# Patient Record
Sex: FEMALE | Race: WHITE | NOT HISPANIC OR LATINO | Employment: FULL TIME
[De-identification: names, ages, dates, MRNs, and addresses within clinical notes are randomized per-mention and may not be internally consistent; named-entity substitution may affect disease eponyms.]

---

## 2017-06-08 DIAGNOSIS — D33.3 ACOUSTIC NEUROMA (H): Primary | ICD-10-CM

## 2017-06-17 ENCOUNTER — HEALTH MAINTENANCE LETTER (OUTPATIENT)
Age: 62
End: 2017-06-17

## 2017-08-02 ENCOUNTER — OFFICE VISIT (OUTPATIENT)
Dept: AUDIOLOGY | Facility: CLINIC | Age: 62
End: 2017-08-02

## 2017-08-02 DIAGNOSIS — D33.3 ACOUSTIC NEUROMA (H): ICD-10-CM

## 2017-08-02 DIAGNOSIS — H90.3 ASYMMETRICAL SENSORINEURAL HEARING LOSS: Primary | ICD-10-CM

## 2017-08-02 NOTE — MR AVS SNAPSHOT
After Visit Summary   8/2/2017    Herminia Pastor    MRN: [id number]           Patient Information     Date Of Birth          1955        Visit Information        Provider Department      8/2/2017 2:30 PM Erica Solano AuD [facility] Audiology        Today's Diagnoses     Asymmetrical sensorineural hearing loss    -  1    Acoustic neuroma (H)           Follow-ups after your visit        Who to contact     Please call your clinic at [phone] to:    Ask questions about your health    Make or cancel appointments    Discuss your medicines    Learn about your test results    Speak to your doctor   If you have compliments or concerns about an experience at your clinic, or if you wish to file a complaint, please contact [facility] Patient Relations at [phone] or email us at [email or web address]         Additional Information About Your Visit        MyChart Information     [employer] gives you secure access to your electronic health record. If you see a primary care provider, you can also send messages to your care team and make appointments. If you have questions, please call your primary care clinic.  If you do not have a primary care provider, please call [phone] and they will assist you.      [employer] is an electronic gateway that provides easy, online access to your medical records. With [employer], you can request a clinic appointment, read your test results, renew a prescription or communicate with your care team.     To access your existing account, please contact your [facility] Clinic or call [phone] for assistance.        Care EveryWhere ID     This is your Care EveryWhere ID. This could be used by other organizations to access your [place] medical records  [id number]        Your Vitals Were     Last Period                   (LMP Unknown)            Blood Pressure from Last 3 Encounters:   11/05/15 117/69   06/26/15  138/70   08/21/14 148/72    Weight from Last 3 Encounters:   11/05/15 78.8 kg (173 lb 11.6 oz)   06/26/15 77.1 kg (170 lb)   08/18/14 78.8 kg (173 lb 11.6 oz)              We Performed the Following     AUDIOGRAM/TYMPANOGRAM - INTERFACE     AUDIOLOGY ADULT REFERRAL     Reduced 52 - Cmprhn Audiometry Thrshld Eval & Speech Recog   (78583)     Tymps / Reflex   (46588)        Primary Care Provider    Md Other Clinic                Equal Access to Services     ROYER SANDHU : Hadii aad ku hadasho Soomaali, waaxda luqadaha, qaybta kaalmada adeegyada, jorgito montero haybrian landeros . So [facility] [phone].    ATENCIÓN: Si habla español, tiene a sheppard disposición servicios gratuitos de asistencia lingüística. Llame al [phone].    We comply with applicable federal civil rights laws and [place] laws. We do not discriminate on the basis of race, color, national origin, age, disability sex, sexual orientation or gender identity.            Thank you!     Thank you for choosing [facility] AUDIOLOGY  for your care. Our goal is always to provide you with excellent care. Hearing back from our patients is one way we can continue to improve our services. Please take a few minutes to complete the written survey that you may receive in the mail after your visit with us. Thank you!             Your Updated Medication List - Protect others around you: Learn how to safely use, store and throw away your medicines at www.disposemymeds.org.          This list is accurate as of: 8/2/17  4:07 PM.  Always use your most recent med list.                   Brand Name Dispense Instructions for use Diagnosis    CALCIUM PO      Take 300 mg by mouth daily        chlorpheniramine-pseudoePHEDrine 4-60 MG Tabs per tablet    PSEUDO-GEST PLUS     Take 1 tablet by mouth every 6 hours as needed for allergies        GLUCOSAMINE CHONDR COMPLEX PO      Take 2,000 mg by mouth daily        HYDROCHLOROTHIAZIDE PO      Take 25 mg by mouth daily         OMEPRAZOLE PO      Take 20 mg by mouth every morning        VITAMIN B 12 PO      Take 1,000 mcg by mouth daily

## 2017-08-02 NOTE — PROGRESS NOTES
AUDIOLOGY REPORT    SUBJECTIVE:  Herminia Pastor is a 61 year old female who was seen was seen in Audiology at the [facility] for audiologic evaluation, referred by Wilfrid Garcia MD. The patient has been seen previously in this clinic on 09/15/2015 for assessment and results indicated a normal sloping to mild sensorineural hearing loss in the left ear, with no responses coming from the right ear (dead ear). The patient had right acoustic neuroma resection surgery in 2014 in the right ear.The patient reports a bothersome tinnitus that is fairly constant in the right ear, along with ongoing dizziness since surgery. The patient denies pain drainage and tinnitus in the left ear.        OBJECTIVE:  Otoscopic exam indicates ears are clear of cerumen bilaterally     Pure Tone Thresholds assessed using conventional audiometry with good  reliability from 250-8000 Hz bilaterally using circumaural headphones     RIGHT:   DNT due to documented no responses on 09/15/2015    LEFT:    Normal sloping to mild rising to normal sensorineural hearing loss. Decrease at 500 Hz, and improvement at 4000 Hz is noted re: last test.     Tympanogram:    RIGHT: restricted eardrum mobility     LEFT:   restricted eardrum mobility     Reflexes (reported by stimulus ear):  RIGHT: Ipsilateral is DNT  RIGHT: Contralateral is DNT  LEFT:   Ipsilateral is present at normal levels  LEFT:   Contralateral is DN T      Speech Reception Threshold:    RIGHT: DNT     LEFT:   10 dB HL  Word Recognition Score:     RIGHT: DNT    LEFT:   100% at 55 dB HL using NU-6 recorded word list.      ASSESSMENT:   Left ear shows a normal sloping to mild rising to normal sensorineural hearing loss. Decreased at 500 Hz with an improvement noted at 4000 Hz re; 09/15/2015.Today s results were discussed with the patient in detail.     PLAN:  Patient was counseled regarding hearing loss and impact on communication. Patient is a good  candidate for CROS  amplification at this time.  It is recommended that the patient return for a hearing aid evaluation if so desired.  Please call this clinic with questions regarding these results or recommendations.          Idalmis Mariano, [facility]  Licensed Audiologist  [place] #[id number]

## 2017-11-01 LAB — MAMMOGRAM: NORMAL

## 2018-06-07 ENCOUNTER — TELEPHONE (OUTPATIENT)
Dept: OTOLARYNGOLOGY | Facility: CLINIC | Age: 63
End: 2018-06-07

## 2018-06-07 NOTE — TELEPHONE ENCOUNTER
[facility] Call Center    Phone Message    May a detailed message be left on voicemail: yes    Reason for Call: Other: Please follow up with pt to discuss new excerises for dizziness.     Action Taken: Message routed to:  [facility] garth ent

## 2018-06-07 NOTE — TELEPHONE ENCOUNTER
[facility] Call Center    Phone Message    May a detailed message be left on voicemail: yes    Reason for Call: Other: Please follow up with pt, returning call     Action Taken: Message routed to:  [facility] garth ent

## 2018-06-08 NOTE — TELEPHONE ENCOUNTER
6-8-18 spoke with pt: she states she is still having dizziness and is wondering if there is anything new Dr. Garcia is prescribing- any med's or exercises.  Pt s/p right trans lab acoustic neuroma resection 2014.  S/p right plate removal 2015.  Suggested pt rtc with MRI- due in August 2018. Pt states she can't afford this, she has a $ 2000 deductible. Also suggest PT- she has tried this and does not want to try it again-  It has been 3-4 since last PT and she should give this a try again. If PT would not be helpful then she may need balance testing. She was hoping for a medication. She will call back regarding the MRI.   Rebecca Machuca RN  ENT Care Coordinator   Otology  [phone]  6/8/2018 2:20 PM

## 2018-06-20 DIAGNOSIS — D33.3 ACOUSTIC NEUROMA (H): Primary | ICD-10-CM

## 2018-06-23 ENCOUNTER — TELEPHONE (OUTPATIENT)
Dept: OTOLARYNGOLOGY | Facility: CLINIC | Age: 63
End: 2018-06-23

## 2018-06-23 NOTE — TELEPHONE ENCOUNTER
Left patient voicemail regarding scheduling audio, mri and Dr Garcia. Gave patient my direct phone number to contact.      Harper  ENT Senior Clinic Coordinator

## 2018-07-11 ENCOUNTER — RADIANT APPOINTMENT (OUTPATIENT)
Dept: MRI IMAGING | Facility: CLINIC | Age: 63
End: 2018-07-11
Attending: OTOLARYNGOLOGY
Payer: COMMERCIAL

## 2018-07-11 DIAGNOSIS — D33.3 ACOUSTIC NEUROMA (H): ICD-10-CM

## 2018-07-11 RX ORDER — GADOBUTROL 604.72 MG/ML
7.5 INJECTION INTRAVENOUS ONCE
Status: COMPLETED | OUTPATIENT
Start: 2018-07-11 | End: 2018-07-11

## 2018-07-11 RX ADMIN — GADOBUTROL 7.5 ML: 604.72 INJECTION INTRAVENOUS at 16:38

## 2018-07-11 NOTE — DISCHARGE INSTRUCTIONS

## 2018-07-12 DIAGNOSIS — D33.3 ACOUSTIC NEUROMA (H): Primary | ICD-10-CM

## 2018-07-12 DIAGNOSIS — R42 DIZZINESS: ICD-10-CM

## 2018-07-13 ENCOUNTER — OFFICE VISIT (OUTPATIENT)
Dept: AUDIOLOGY | Facility: CLINIC | Age: 63
End: 2018-07-13
Payer: COMMERCIAL

## 2018-07-13 ENCOUNTER — OFFICE VISIT (OUTPATIENT)
Dept: OTOLARYNGOLOGY | Facility: CLINIC | Age: 63
End: 2018-07-13
Payer: COMMERCIAL

## 2018-07-13 DIAGNOSIS — R42 DIZZINESS: ICD-10-CM

## 2018-07-13 DIAGNOSIS — D33.3 VESTIBULAR SCHWANNOMA (H): Primary | ICD-10-CM

## 2018-07-13 DIAGNOSIS — H90.3 SENSORINEURAL HEARING LOSS, BILATERAL: Primary | ICD-10-CM

## 2018-07-13 DIAGNOSIS — D33.3 ACOUSTIC NEUROMA (H): ICD-10-CM

## 2018-07-13 ASSESSMENT — PAIN SCALES - GENERAL: PAINLEVEL: NO PAIN (0)

## 2018-07-13 NOTE — PATIENT INSTRUCTIONS
Please review the handout on Vertigo option:  Dr. Garcia suggest: Gentamycin injection or labyrinthectomy.   Please call our clinic for any questions,concerns,or worsening symptoms.      Clinic #[phone]       Option 3  for the triage nurse.

## 2018-07-13 NOTE — LETTER
7/13/2018       RE: Herminia Pastor  [address]  [place] [address]     Dear Colleague,    Thank you for referring your patient, Herminia Pastor, to the [facility] EAR NOSE AND THROAT at [facility]. Please see a copy of my visit note below.    History: This is a 62-year-old woman who underwent surgery for her acoustic neuroma back in 2014.  Of note, she was having dizziness prior to the surgery and had the surgery with the hope that it would get better.  If anything, she feels that her symptoms are worse.  The patient had no functional hearing in the affected right ear prior to the surgery and has not had a change since the procedure.  She had a translabyrinthine approach.  She reports that her symptoms of dizziness are incapacitating now and really bothersome.  She is very upset with the symptoms.  There are no other new medical problems to speak of.    Physical exam: Exam today shows that the incision line is clean and dry.  The right mastoid area appears healthy and palpation in the region does not cause pain now.  The tympanic membranes are anatomically intact.  Her facial nerve is a house Brackmann class I bilaterally.  She was able to perform tandem gait, and finger to nose testing.    Audio: An audiogram was obtained today and shows that there is no evidence for abnormality in the left ear.    Imaging: Imaging does not reveal a recurrent acoustic neuroma.  The fat graft has undergone atrophy.    Impression: Persistent dizziness and vertigo following acoustic neuroma surgery.    Discussion: She was seen in neurology approximately 1 year after her surgery.  Dr. 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  There is no note of this in the MRI report.  It would be unusual to have this disease without MRI evidence. Another visit to neurology might also be considered before anything further is performed.    Another  alternative theory might be that there is residual vestibular fibers along with the facial nerve that persist and there may be questionable areas of enhancement in the cochlea.  Perhaps a gentamicin injection through the round window would eliminate any residual function.  Prior to doing anything, we should repeat the ENG.  The ENG testing done several years ago was abnormal but did not have a diagnostic finding.    Reexploring the ear or field seems overly aggressive to me.  Considering her desperation, it should be considered  Perhaps there is some residual tissue in the vestibule that is contributing to her symptoms.      Again, thank you for allowing me to participate in the care of your patient.      Sincerely,    Wilfrid Garcia MD

## 2018-07-13 NOTE — MR AVS SNAPSHOT
After Visit Summary   7/13/2018    Herminia Pastor    MRN: [id number]           Patient Information     Date Of Birth          1955        Visit Information        Provider Department      7/13/2018 11:00 AM Hafsa Gaxiola, [facility] Audiology        Today's Diagnoses     Sensorineural hearing loss, bilateral    -  1    Acoustic neuroma (H)        Dizziness           Follow-ups after your visit        Who to contact     Please call your clinic at [phone] to:    Ask questions about your health    Make or cancel appointments    Discuss your medicines    Learn about your test results    Speak to your doctor            Additional Information About Your Visit        MyChart Information     [employer] gives you secure access to your electronic health record. If you see a primary care provider, you can also send messages to your care team and make appointments. If you have questions, please call your primary care clinic.  If you do not have a primary care provider, please call [phone] and they will assist you.      [employer] is an electronic gateway that provides easy, online access to your medical records. With [employer], you can request a clinic appointment, read your test results, renew a prescription or communicate with your care team.     To access your existing account, please contact your [facility] Clinic or call [phone] for assistance.        Care EveryWhere ID     This is your Care EveryWhere ID. This could be used by other organizations to access your [place] medical records  [id number]        Your Vitals Were     Last Period                   (LMP Unknown)            Blood Pressure from Last 3 Encounters:   11/05/15 117/69   06/26/15 138/70   08/21/14 148/72    Weight from Last 3 Encounters:   11/05/15 78.8 kg (173 lb 11.6 oz)   06/26/15 77.1 kg (170 lb)   08/18/14 78.8 kg (173 lb 11.6 oz)              We Performed the Following     AUDIOGRAM/TYMPANOGRAM -  INTERFACE     AUDIOLOGY ADULT REFERRAL     Reduced 52 - Cmprhn Audiometry Thrshld Eval & Speech Recog   (15399)     Reduced 52 - Tymps / Reflex   (65866)        Primary Care Provider    None Specified       No primary provider on file.        Equal Access to Services     ROYER SANDHU : Hadelvis freya ramirez julieto Sojorgeali, waaxda luqadaha, qaybta kaalmada adeammonyada, jorgito idiin crissy yrnmamon phelan leti mclain. So [facility] [phone].    ATENCIÓN: Si habla español, tiene a sheppard disposición servicios gratuitos de asistencia lingüística. Llame al [phone].    We comply with applicable federal civil rights laws and [place] laws. We do not discriminate on the basis of race, color, national origin, age, disability, sex, sexual orientation, or gender identity.            Thank you!     Thank you for choosing [facility] AUDIOLOGY  for your care. Our goal is always to provide you with excellent care. Hearing back from our patients is one way we can continue to improve our services. Please take a few minutes to complete the written survey that you may receive in the mail after your visit with us. Thank you!             Your Updated Medication List - Protect others around you: Learn how to safely use, store and throw away your medicines at www.disposemymeds.org.          This list is accurate as of 7/13/18 11:59 PM.  Always use your most recent med list.                   Brand Name Dispense Instructions for use Diagnosis    CALCIUM PO      Take 300 mg by mouth daily        chlorpheniramine-pseudoePHEDrine 4-60 MG Tabs per tablet    PSEUDO-GEST PLUS     Take 1 tablet by mouth every 6 hours as needed for allergies        GLUCOSAMINE CHONDR COMPLEX PO      Take 2,000 mg by mouth daily        HYDROCHLOROTHIAZIDE PO      Take 25 mg by mouth daily        OMEPRAZOLE PO      Take 20 mg by mouth every morning        VITAMIN B 12 PO      Take 1,000 mcg by mouth daily

## 2018-07-13 NOTE — MR AVS SNAPSHOT
After Visit Summary   7/13/2018    Herminia Pastor    MRN: [id number]           Patient Information     Date Of Birth          1955        Visit Information        Provider Department      7/13/2018 12:00 PM Wilfrid Garcia MD [facility] Ear Nose and Throat        Care Instructions    Please review the handout on Vertigo option:  Dr. Garcia suggest: Gentamycin injection or labyrinthectomy.   Please call our clinic for any questions,concerns,or worsening symptoms.      Clinic #[phone]       Option 3  for the triage nurse.          Follow-ups after your visit        Who to contact     Please call your clinic at [phone] to:    Ask questions about your health    Make or cancel appointments    Discuss your medicines    Learn about your test results    Speak to your doctor            Additional Information About Your Visit        [employer] Information     [employer] gives you secure access to your electronic health record. If you see a primary care provider, you can also send messages to your care team and make appointments. If you have questions, please call your primary care clinic.  If you do not have a primary care provider, please call [phone] and they will assist you.      [employer] is an electronic gateway that provides easy, online access to your medical records. With [employer], you can request a clinic appointment, read your test results, renew a prescription or communicate with your care team.     To access your existing account, please contact your [facility] Clinic or call [phone] for assistance.        Care EveryWhere ID     This is your Care EveryWhere ID. This could be used by other organizations to access your [place] medical records  [id number]        Your Vitals Were     Last Period                   (LMP Unknown)            Blood Pressure from Last 3 Encounters:   11/05/15 117/69   06/26/15 138/70   08/21/14 148/72    Weight from Last 3 Encounters:    11/05/15 78.8 kg (173 lb 11.6 oz)   06/26/15 77.1 kg (170 lb)   08/18/14 78.8 kg (173 lb 11.6 oz)              Today, you had the following     No orders found for display       Primary Care Provider    None Specified       No primary provider on file.        Equal Access to Services     YUSUFАНДРЕЙ PHOEBE : Hadii freya ku hadsnehalo Sojorgeali, waaxda luqadaha, qaybta kaalmada isha, jorgito leivarahulyumiko mclain. So [facility] [phone].    ATENCIÓN: Si habla español, tiene a sheppard disposición servicios gratuitos de asistencia lingüística. Llame al [phone].    We comply with applicable federal civil rights laws and [place] laws. We do not discriminate on the basis of race, color, national origin, age, disability, sex, sexual orientation, or gender identity.            Thank you!     Thank you for choosing [facility] EAR NOSE AND THROAT  for your care. Our goal is always to provide you with excellent care. Hearing back from our patients is one way we can continue to improve our services. Please take a few minutes to complete the written survey that you may receive in the mail after your visit with us. Thank you!             Your Updated Medication List - Protect others around you: Learn how to safely use, store and throw away your medicines at www.disposemymeds.org.          This list is accurate as of 7/13/18 12:24 PM.  Always use your most recent med list.                   Brand Name Dispense Instructions for use Diagnosis    CALCIUM PO      Take 300 mg by mouth daily        chlorpheniramine-pseudoePHEDrine 4-60 MG Tabs per tablet    PSEUDO-GEST PLUS     Take 1 tablet by mouth every 6 hours as needed for allergies        GLUCOSAMINE CHONDR COMPLEX PO      Take 2,000 mg by mouth daily        HYDROCHLOROTHIAZIDE PO      Take 25 mg by mouth daily        OMEPRAZOLE PO      Take 20 mg by mouth every morning        VITAMIN B 12 PO      Take 1,000 mcg by mouth daily

## 2018-07-16 NOTE — PROGRESS NOTES
History: This is a 62-year-old woman who underwent surgery for her acoustic neuroma back in 2014.  Of note, she was having dizziness prior to the surgery and had the surgery with the hope that it would get better.  If anything, she feels that her symptoms are worse.  The patient had no functional hearing in the affected right ear prior to the surgery and has not had a change since the procedure.  She had a translabyrinthine approach.  She reports that her symptoms of dizziness are incapacitating now and really bothersome.  She is very upset with the symptoms.  There are no other new medical problems to speak of.    Physical exam: Exam today shows that the incision line is clean and dry.  The right mastoid area appears healthy and palpation in the region does not cause pain now.  The tympanic membranes are anatomically intact.  Her facial nerve is a house Brackmann class I bilaterally.  She was able to perform tandem gait, and finger to nose testing.    Audio: An audiogram was obtained today and shows that there is no evidence for abnormality in the left ear.    Imaging: Imaging does not reveal a recurrent acoustic neuroma.  The fat graft has undergone atrophy.    Impression: Persistent dizziness and vertigo following acoustic neuroma surgery.    Discussion: She was seen in neurology approximately 1 year after her surgery.  Dr. 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  There is no note of this in the MRI report.  It would be unusual to have this disease without MRI evidence. Another visit to neurology might also be considered before anything further is performed.    Another alternative theory might be that there is residual vestibular fibers along with the facial nerve that persist and there may be questionable areas of enhancement in the cochlea.  Perhaps a gentamicin injection through the round window would eliminate any residual function.  Prior to doing anything, we  should repeat the ENG.  The ENG testing done several years ago was abnormal but did not have a diagnostic finding.    Reexploring the ear or field seems overly aggressive to me.  Considering her desperation, it should be considered  Perhaps there is some residual tissue in the vestibule that is contributing to her symptoms.      Addendum:  I went over some old notes and the MRI imaging.  There was a question raised about whether this might represent central pontine myelinolysis.  The images in this area appear abnormal but this was not noted in the radiology report. I called and discussed this with the radiologist.  We agreed that the area of the raulito did not look normal but this has not changed since the surgery.  The radiologist felt that this was inconsistent with CPM.  Thus, this did not change the plan or the existing diagnosis.

## 2018-07-17 NOTE — PROGRESS NOTES
AUDIOLOGY REPORT    SUMMARY: Audiology visit completed. See audiogram for results.      RECOMMENDATIONS: Follow-up with ENT.    Carla Duran, [facility]  Licensed Audiologist  [place] License #[id number]

## 2019-10-01 ENCOUNTER — HEALTH MAINTENANCE LETTER (OUTPATIENT)
Age: 64
End: 2019-10-01

## 2019-12-04 ENCOUNTER — DOCUMENTATION ONLY (OUTPATIENT)
Dept: CARE COORDINATION | Facility: CLINIC | Age: 64
End: 2019-12-04

## 2019-12-23 ENCOUNTER — TELEPHONE (OUTPATIENT)
Dept: OTOLARYNGOLOGY | Facility: CLINIC | Age: 64
End: 2019-12-23

## 2019-12-23 NOTE — TELEPHONE ENCOUNTER
FUTURE VISIT INFORMATION      FUTURE VISIT INFORMATION:    Date: 12/31/19    Time: 11:30AM Audio/ 12:30PM ENT    [place] [facility] [place] INFORMATION:    Referring provider:      Referring providers clinic:      Reason for visit/diagnosis  Dizzy - Garcia patient     RECORDS REQUESTED FROM:       Clinic name Comments Records Status Imaging Status   [facility]  7/13/18 audiogram with Hafsa Moreau  7/13/18 notes with Dr Garcia   5/27/15 VNG with Aurelia Moreau  [facility]    [facility] 7/11/18 MR brain  Epic PACS

## 2019-12-31 ENCOUNTER — OFFICE VISIT (OUTPATIENT)
Dept: OTOLARYNGOLOGY | Facility: CLINIC | Age: 64
End: 2019-12-31
Payer: COMMERCIAL

## 2019-12-31 ENCOUNTER — OFFICE VISIT (OUTPATIENT)
Dept: AUDIOLOGY | Facility: CLINIC | Age: 64
End: 2019-12-31
Payer: COMMERCIAL

## 2019-12-31 ENCOUNTER — PRE VISIT (OUTPATIENT)
Dept: OTOLARYNGOLOGY | Facility: CLINIC | Age: 64
End: 2019-12-31

## 2019-12-31 VITALS
SYSTOLIC BLOOD PRESSURE: 122 MMHG | BODY MASS INDEX: 33.57 KG/M2 | RESPIRATION RATE: 20 BRPM | HEART RATE: 76 BPM | WEIGHT: 171 LBS | TEMPERATURE: 98.1 F | HEIGHT: 60 IN | DIASTOLIC BLOOD PRESSURE: 73 MMHG

## 2019-12-31 DIAGNOSIS — H93.11 TINNITUS, RIGHT: ICD-10-CM

## 2019-12-31 DIAGNOSIS — H61.23 EXCESSIVE CERUMEN IN BOTH EAR CANALS: ICD-10-CM

## 2019-12-31 DIAGNOSIS — H91.91 ACQUIRED DEAFNESS OF RIGHT EAR: ICD-10-CM

## 2019-12-31 DIAGNOSIS — H90.3 SENSORINEURAL HEARING LOSS, BILATERAL: Primary | ICD-10-CM

## 2019-12-31 RX ORDER — LISINOPRIL 10 MG/1
10 TABLET ORAL
COMMUNITY
Start: 2019-12-09 | End: 2024-01-03

## 2019-12-31 RX ORDER — AMLODIPINE BESYLATE 5 MG/1
TABLET ORAL
COMMUNITY
Start: 2019-12-09 | End: 2024-01-03

## 2019-12-31 ASSESSMENT — PAIN SCALES - GENERAL: PAINLEVEL: NO PAIN (0)

## 2019-12-31 ASSESSMENT — MIFFLIN-ST. JEOR: SCORE: 1247.15

## 2019-12-31 NOTE — PROGRESS NOTES
Dear Dr. Tapia primary care provider on file.:    I had the pleasure of seeing Herminia Pastor in followup today at the [facility].    CHIEF COMPLAINT: Right deafness    HISTORY OF PRESENT ILLNESS: Patient is a 64-year-old in today for follow-up from her last visit in July 2018.  She had a right acoustic neuroma removed through a translabyrinthine approach in 2014.  She did have dizziness before that and significant dizziness problems afterwards.  On her follow-up today, that is much improved mainly with physical therapy and continued physical therapy at home.  Again the off-balance she was feeling is significantly improved.  She does have intermittent lightheadedness but she is able to function and she feels doing well from a balance standpoint.  She does have significant right tinnitus that bothers her considerably.  When it gets worse she sometimes gets nauseated with it.  Only has tinnitus in the right ear.  She has no right hearing since the surgery.  Her left ear has been stable.  She denies any dysphasia, hoarseness, facial paresthesias.  She comes in today as it was her understanding that a steroid injection in the right ear would possibly help with the tinnitus.    MEDICATIONS: Please refer to the detailed medication reconciliation performed by my nurse today, which I have reviewed and signed.     ALLERGIES:    Allergies   Allergen Reactions     Dust Mites      Grass      Mold      Ragweeds Other (See Comments)     Crustiness in nose, headaches     Trees        HABITS: Social History    Substance and Sexual Activity      Alcohol use: Yes        Alcohol/week: 0.0 standard drinks        Comment: occasional use     History   Smoking Status     Current Every Day Smoker     Packs/day: 0.50     Years: 25.00     Types: Cigarettes   Smokeless Tobacco     Never Used         PAST MEDICAL HISTORY:  Please see today's intake form (for the remainder of the PMH) which I reviewed and  signed.  Past Medical History:   Diagnosis Date     Acoustic neuroma (H)      Allergic rhinitis Jan 1980     Dry throat      Gastro-oesophageal reflux disease      Hearing loss      Heartburn      Hypertension      Itchy eyes      Night sweats      Ringing in ears      Sleep apnea Jan 2010     Sneezing      Snoring      Tinnitus      Visual loss      Weight gain        FAMILY HISTORY/SOCIAL HISTORY:    Family History   Problem Relation Age of Onset     Diabetes Maternal Grandmother      Diabetes Paternal Grandmother      Hypertension Mother      Cerebrovascular Disease Mother      Alzheimer Disease Mother      Depression Mother      Psychotic Disorder Mother      Dementia Mother      Thyroid Disease Maternal Uncle       Social History     Socioeconomic History     Marital status:      Spouse name: Not on file     Number of children: Not on file     Years of education: Not on file     Highest education level: Not on file   Occupational History     Not on file   Social Needs     Financial resource strain: Not on file     Food insecurity:     Worry: Not on file     Inability: Not on file     Transportation needs:     Medical: Not on file     Non-medical: Not on file   Tobacco Use     Smoking status: Current Every Day Smoker     Packs/day: 0.50     Years: 25.00     Pack years: 12.50     Types: Cigarettes     Smokeless tobacco: Never Used   Substance and Sexual Activity     Alcohol use: Yes     Alcohol/week: 0.0 standard drinks     Comment: occasional use     Drug use: No     Sexual activity: Not Currently   Lifestyle     Physical activity:     Days per week: Not on file     Minutes per session: Not on file     Stress: Not on file   Relationships     Social connections:     Talks on phone: Not on file     Gets together: Not on file     Attends Evangelical service: Not on file     Active member of club or organization: Not on file     Attends meetings of clubs or organizations: Not on file     Relationship status:  Not on file     Intimate partner violence:     Fear of current or ex partner: Not on file     Emotionally abused: Not on file     Physically abused: Not on file     Forced sexual activity: Not on file   Other Topics Concern     Parent/sibling w/ CABG, MI or angioplasty before 65F 55M? Not Asked   Social History Narrative     Not on file       REVIEW OF SYSTEMS: Patient Supplied Answers to Review of Systems   ENT ROS 12/28/2019   Neurology -   Ears, Nose, Throat Ringing/noise in ears            The remainder of the 10 point ROS is negative    PHYSICIAL EXAMINATION:  Constitutional: The patient was well-groomed and in no acute distress.   Skin: Warm and pink.  Psychiatric: The patient's affect was calm, cooperative, and appropriate.   Respiratory: Breathing comfortably without stridor or exertion of accessory muscles.  Eyes: Pupils were equal and reactive. Extraocular movement intact.   Head: Normocephalic and atraumatic. No lesions or scars.  Ears: Patient placed under the microscope for microscopic evaluation and cleaning of cerumen which was obscuring full visualization and complete assessment of both TMs. Under high power magnification, the right ear was examined and cleaned of cerumen using curet, alligator forceps, and suction.  After cleaning, TM is fully visualized and has normal position with normal middle ear aeration. The left ear was then cleaned and inspected using microscope, instruments and similar techniques. After cleaning of cerumen, the TM has normal position with normal aeration to middle ear.  Postauricular incision on the right side is well-healed.  Nose: Sinuses were nontender. Anterior rhinoscopy revealed midline septum and absence of purulence or polyps.  Oral Cavity: Normal tongue, floor of mouth, buccal mucosa, and palate. No abnormal lymph tissue in the oropharynx.   Neck: The parotid is soft without masses. Supple with normal laryngeal and tracheal landmarks.   Lymphatic: There is no  palpable lymphadenopathy or other masses in the neck.   Neurologic: Alert and oriented x 3. Cranial nerves III-XI within normal limits. Voice quality normal.  Cerebellar Function Tests:  Grossly normal    Audiogram: Audiogram performed shows no hearing on the right ear as expected.  The left ear shows a mild high-frequency sensorineural hearing loss with excellent discrimination at 100%.    IMPRESSION AND PLAN:   1. Right tinnitus: Discussed this with her in detail, secondary to the deafness in the ear from both the acoustic and surgery.  Unfortunately injections really have not been shown to be beneficial for tinnitus.  We had talked to her about injection for possible help with dizziness but again that has improved.  At this point she is comfortable with the tinnitus, just thought we could help and she is just going to monitor.  Recommend we follow along with the left ear and hearing to make sure it stays stable, follow-up in 1 year.  2. Left high-frequency sensorineural hearing loss: Stable, no treatment needed, monitor.  3. Excessive cerumen: Cleaned today, no further treatment needed, monitor.    Thank you very much for the opportunity to participate in the care of your patient.    Rick L Nissen MD

## 2019-12-31 NOTE — LETTER
12/31/2019       RE: Herminia Pastor  [address]  [place] [address]     Dear Colleague,    Thank you for referring your patient, Herminia Pastor, to the [facility] at [facility]. Please see a copy of my visit note below.    Dear Dr. Tapia primary care provider on file.:    I had the pleasure of seeing Herminia Pastor in followup today at the [facility].    CHIEF COMPLAINT: Right deafness    HISTORY OF PRESENT ILLNESS: Patient is a 64-year-old in today for follow-up from her last visit in July 2018.  She had a right acoustic neuroma removed through a translabyrinthine approach in 2014.  She did have dizziness before that and significant dizziness problems afterwards.  On her follow-up today, that is much improved mainly with physical therapy and continued physical therapy at home.  Again the off-balance she was feeling is significantly improved.  She does have intermittent lightheadedness but she is able to function and she feels doing well from a balance standpoint.  She does have significant right tinnitus that bothers her considerably.  When it gets worse she sometimes gets nauseated with it.  Only has tinnitus in the right ear.  She has no right hearing since the surgery.  Her left ear has been stable.  She denies any dysphasia, hoarseness, facial paresthesias.  She comes in today as it was her understanding that a steroid injection in the right ear would possibly help with the tinnitus.    MEDICATIONS: Please refer to the detailed medication reconciliation performed by my nurse today, which I have reviewed and signed.     ALLERGIES:    Allergies   Allergen Reactions     Dust Mites      Grass      Mold      Ragweeds Other (See Comments)     Crustiness in nose, headaches     Trees        HABITS: Social History    Substance and Sexual Activity      Alcohol use: Yes        Alcohol/week: 0.0 standard drinks         Comment: occasional use     History   Smoking Status     Current Every Day Smoker     Packs/day: 0.50     Years: 25.00     Types: Cigarettes   Smokeless Tobacco     Never Used         PAST MEDICAL HISTORY:  Please see today's intake form (for the remainder of the PMH) which I reviewed and signed.  Past Medical History:   Diagnosis Date     Acoustic neuroma (H)      Allergic rhinitis Jan 1980     Dry throat      Gastro-oesophageal reflux disease      Hearing loss      Heartburn      Hypertension      Itchy eyes      Night sweats      Ringing in ears      Sleep apnea Jan 2010     Sneezing      Snoring      Tinnitus      Visual loss      Weight gain        FAMILY HISTORY/SOCIAL HISTORY:    Family History   Problem Relation Age of Onset     Diabetes Maternal Grandmother      Diabetes Paternal Grandmother      Hypertension Mother      Cerebrovascular Disease Mother      Alzheimer Disease Mother      Depression Mother      Psychotic Disorder Mother      Dementia Mother      Thyroid Disease Maternal Uncle       Social History     Socioeconomic History     Marital status:      Spouse name: Not on file     Number of children: Not on file     Years of education: Not on file     Highest education level: Not on file   Occupational History     Not on file   Social Needs     Financial resource strain: Not on file     Food insecurity:     Worry: Not on file     Inability: Not on file     Transportation needs:     Medical: Not on file     Non-medical: Not on file   Tobacco Use     Smoking status: Current Every Day Smoker     Packs/day: 0.50     Years: 25.00     Pack years: 12.50     Types: Cigarettes     Smokeless tobacco: Never Used   Substance and Sexual Activity     Alcohol use: Yes     Alcohol/week: 0.0 standard drinks     Comment: occasional use     Drug use: No     Sexual activity: Not Currently   Lifestyle     Physical activity:     Days per week: Not on file     Minutes per session: Not on file     Stress: Not on  file   Relationships     Social connections:     Talks on phone: Not on file     Gets together: Not on file     Attends Mu-ism service: Not on file     Active member of club or organization: Not on file     Attends meetings of clubs or organizations: Not on file     Relationship status: Not on file     Intimate partner violence:     Fear of current or ex partner: Not on file     Emotionally abused: Not on file     Physically abused: Not on file     Forced sexual activity: Not on file   Other Topics Concern     Parent/sibling w/ CABG, MI or angioplasty before 65F 55M? Not Asked   Social History Narrative     Not on file       REVIEW OF SYSTEMS: Patient Supplied Answers to Review of Systems   ENT ROS 12/28/2019   Neurology -   Ears, Nose, Throat Ringing/noise in ears            The remainder of the 10 point ROS is negative    PHYSICIAL EXAMINATION:  Constitutional: The patient was well-groomed and in no acute distress.   Skin: Warm and pink.  Psychiatric: The patient's affect was calm, cooperative, and appropriate.   Respiratory: Breathing comfortably without stridor or exertion of accessory muscles.  Eyes: Pupils were equal and reactive. Extraocular movement intact.   Head: Normocephalic and atraumatic. No lesions or scars.  Ears: Patient placed under the microscope for microscopic evaluation and cleaning of cerumen which was obscuring full visualization and complete assessment of both TMs. Under high power magnification, the right ear was examined and cleaned of cerumen using curet, alligator forceps, and suction.  After cleaning, TM is fully visualized and has normal position with normal middle ear aeration. The left ear was then cleaned and inspected using microscope, instruments and similar techniques. After cleaning of cerumen, the TM has normal position with normal aeration to middle ear.  Postauricular incision on the right side is well-healed.  Nose: Sinuses were nontender. Anterior rhinoscopy revealed  midline septum and absence of purulence or polyps.  Oral Cavity: Normal tongue, floor of mouth, buccal mucosa, and palate. No abnormal lymph tissue in the oropharynx.   Neck: The parotid is soft without masses. Supple with normal laryngeal and tracheal landmarks.   Lymphatic: There is no palpable lymphadenopathy or other masses in the neck.   Neurologic: Alert and oriented x 3. Cranial nerves III-XI within normal limits. Voice quality normal.  Cerebellar Function Tests:  Grossly normal    Audiogram: Audiogram performed shows no hearing on the right ear as expected.  The left ear shows a mild high-frequency sensorineural hearing loss with excellent discrimination at 100%.    IMPRESSION AND PLAN:   1. Right tinnitus: Discussed this with her in detail, secondary to the deafness in the ear from both the acoustic and surgery.  Unfortunately injections really have not been shown to be beneficial for tinnitus.  We had talked to her about injection for possible help with dizziness but again that has improved.  At this point she is comfortable with the tinnitus, just thought we could help and she is just going to monitor.  Recommend we follow along with the left ear and hearing to make sure it stays stable, follow-up in 1 year.  2. Left high-frequency sensorineural hearing loss: Stable, no treatment needed, monitor.  3. Excessive cerumen: Cleaned today, no further treatment needed, monitor.    Thank you very much for the opportunity to participate in the care of your patient.    Rick L Nissen MD

## 2019-12-31 NOTE — NURSING NOTE
Chief Complaint   Patient presents with     Consult     dizziness      Blood pressure 122/73, pulse 76, temperature 98.1  F (36.7  C), resp. rate 20, height 1.524 m (5'), weight 77.6 kg (171 lb), not currently breastfeeding.    Lawrence Garcia LPN

## 2020-01-03 NOTE — PROGRESS NOTES
AUDIOLOGY REPORT    SUMMARY: Audiology visit completed. See audiogram for results.      RECOMMENDATIONS: Follow-up with ENT.    Carla Duarn, [facility]  Licensed Audiologist  [place] License #[id number]

## 2020-03-22 ENCOUNTER — HEALTH MAINTENANCE LETTER (OUTPATIENT)
Age: 65
End: 2020-03-22

## 2021-01-15 ENCOUNTER — HEALTH MAINTENANCE LETTER (OUTPATIENT)
Age: 66
End: 2021-01-15

## 2021-09-04 ENCOUNTER — HEALTH MAINTENANCE LETTER (OUTPATIENT)
Age: 66
End: 2021-09-04

## 2022-02-19 ENCOUNTER — HEALTH MAINTENANCE LETTER (OUTPATIENT)
Age: 67
End: 2022-02-19

## 2022-03-01 ENCOUNTER — TRANSFERRED RECORDS (OUTPATIENT)
Dept: MULTI SPECIALTY CLINIC | Facility: CLINIC | Age: 67
End: 2022-03-01

## 2022-04-16 ENCOUNTER — HEALTH MAINTENANCE LETTER (OUTPATIENT)
Age: 67
End: 2022-04-16

## 2022-10-05 RX ORDER — SODIUM, POTASSIUM,MAG SULFATES 17.5-3.13G
354 SOLUTION, RECONSTITUTED, ORAL ORAL SEE ADMIN INSTRUCTIONS
Qty: 354 ML | Refills: 0 | Status: SHIPPED | OUTPATIENT
Start: 2022-10-05 | End: 2024-01-03

## 2022-10-10 ENCOUNTER — HOSPITAL ENCOUNTER (OUTPATIENT)
Facility: CLINIC | Age: 67
Discharge: HOME | End: 2022-10-10
Attending: COLON & RECTAL SURGERY | Admitting: COLON & RECTAL SURGERY
Payer: COMMERCIAL

## 2022-10-10 VITALS
OXYGEN SATURATION: 92 % | HEART RATE: 68 BPM | DIASTOLIC BLOOD PRESSURE: 78 MMHG | RESPIRATION RATE: 12 BRPM | SYSTOLIC BLOOD PRESSURE: 120 MMHG

## 2022-10-10 DIAGNOSIS — Z12.11 ENCOUNTER FOR SCREENING COLONOSCOPY: Primary | ICD-10-CM

## 2022-10-10 LAB — COLONOSCOPY: NORMAL

## 2022-10-10 PROCEDURE — G0500 MOD SEDAT ENDO SERVICE >5YRS: HCPCS | Performed by: COLON & RECTAL SURGERY

## 2022-10-10 PROCEDURE — 99153 MOD SED SAME PHYS/QHP EA: CPT | Performed by: COLON & RECTAL SURGERY

## 2022-10-10 PROCEDURE — 88305 TISSUE EXAM BY PATHOLOGIST: CPT | Mod: TC | Performed by: COLON & RECTAL SURGERY

## 2022-10-10 PROCEDURE — 250N000011 HC RX IP 250 OP 636: Performed by: COLON & RECTAL SURGERY

## 2022-10-10 PROCEDURE — 45385 COLONOSCOPY W/LESION REMOVAL: CPT | Performed by: COLON & RECTAL SURGERY

## 2022-10-10 PROCEDURE — 88305 TISSUE EXAM BY PATHOLOGIST: CPT | Mod: 26 | Performed by: PATHOLOGY

## 2022-10-10 RX ORDER — LIDOCAINE 40 MG/G
CREAM TOPICAL
Status: DISCONTINUED | OUTPATIENT
Start: 2022-10-10 | End: 2022-10-10 | Stop reason: HOSPADM

## 2022-10-10 RX ORDER — ROSUVASTATIN CALCIUM 10 MG/1
10 TABLET, COATED ORAL DAILY
COMMUNITY
Start: 2022-09-06 | End: 2024-05-22

## 2022-10-10 RX ORDER — ONDANSETRON 2 MG/ML
4 INJECTION INTRAMUSCULAR; INTRAVENOUS
Status: DISCONTINUED | OUTPATIENT
Start: 2022-10-10 | End: 2022-10-10 | Stop reason: HOSPADM

## 2022-10-10 RX ORDER — FENTANYL CITRATE 50 UG/ML
INJECTION, SOLUTION INTRAMUSCULAR; INTRAVENOUS PRN
Status: DISCONTINUED | OUTPATIENT
Start: 2022-10-10 | End: 2022-10-10 | Stop reason: HOSPADM

## 2022-10-10 ASSESSMENT — ACTIVITIES OF DAILY LIVING (ADL): ADLS_ACUITY_SCORE: 35

## 2022-10-10 NOTE — H&P
Colon & Rectal Surgery History and Physical  Pre-Endoscopy Procedure Note    History of Present Illness   I have been asked by Dr. Garcia to evaluate this 67 year old female for colorectal cancer screening. She currently denies any abdominal pain, weight loss, bleeding per rectum, or recent change in bowel habits.    Past Medical History  Diagnosis Date     Acoustic neuroma      Allergic rhinitis Jan 1980     Dry throat      Gastro-oesophageal reflux disease      Hearing loss      Heartburn      Hypertension      Itchy eyes      Night sweats      Ringing in ears      Sleep apnea Jan 2010     Tinnitus      Visual loss      Weight gain        Past Surgical History  Procedure Laterality Date     APPENDECTOMY       CHOLECYSTECTOMY       CRANIOTOMY TRANSLABYRINTHINE, EXCISE ACOUSTIC NEUROMA (NEURO)  8/18/2014    Procedure: CRANIOTOMY TRANSLABYRINTHINE, EXCISE ACOUSTIC NEUROMA;  Surgeon: Wilfrid Garcia MD;  Location: [facility] OR     GYN SURGERY      uterine cystectomy     HERNIA REPAIR       REMOVE HARDWARE FACE Right 11/5/2015    Procedure: REMOVE HARDWARE FACE;  Surgeon: Wilfrid Garcia MD;  Location: [facility] OR        Medications  Medication Sig     amLODIPine (NORVASC) 5 MG tablet take one tablet by mouth before bedtime     lisinopril (PRINIVIL/ZESTRIL) 10 MG tablet Take 10 mg by mouth     metFORMIN (GLUCOPHAGE) 500 MG tablet      CALCIUM PO Take 300 mg by mouth daily     chlorpheniramine-pseudoePHEDrine (PSEUDO-GEST PLUS) 4-60 MG TABS Take 1 tablet by mouth every 6 hours as needed for allergies     Glucosamine-Chondroitin (GLUCOSAMINE CHONDR COMPLEX PO) Take 2,000 mg by mouth daily     OMEPRAZOLE PO Take 20 mg by mouth every morning     rosuvastatin (CRESTOR) 10 MG tablet        Allergies  Allergen Reactions     Dust Mites      Grass      Mold      Ragweeds Other (See Comments)     Crustiness in nose, headaches     Trees         Family History   Family history includes Alzheimer Disease in her mother; Cerebrovascular  Disease in her mother; Dementia in her mother; Depression in her mother; Diabetes in her maternal grandmother and paternal grandmother; Hypertension in her mother; Psychotic Disorder in her mother; Thyroid Disease in her maternal uncle.     Social History    She reports that she has been smoking cigarettes. She has a 12.50 pack-year smoking history. She has never used smokeless tobacco. She reports current alcohol use. She reports that she does not use drugs.    Review of Systems   Constitutional:  No fever, weight change or fatigue.    Eyes:     No dry eyes or vision changes.   Ears/Nose/Throat/Neck:  No oral ulcers, sore throat or voice change.    Cardiovascular:   No palpitations, syncope, angina or edema.   Respiratory:    No chest pain, excessive sleepiness, shortness of breath or hemoptysis.    Gastrointestinal:   No abdominal pain, nausea, vomiting, diarrhea or heartburn.    Genitourinary:   No dysuria, hematuria, urinary retention or urinary frequency.   Musculoskeletal:  No joint swelling or arthralgias.    Dermatologic:  No skin rash or other skin changes.   Neurologic:    No focal weakness or numbness. No neuropathy.   Psychiatric:    No depression, anxiety, suicidal ideation, or paranoid ideation.   Endocrine:   No cold or heat intolerance, polydipsia, hirsutism, change in libido, or flushing.   Hematology/Lymphatic:  No bleeding or lymphadenopathy.    Allergy/Immunology:  No rhinitis or hives.     Physical Exam   Vitals:  Blood pressure 150/75, SpO2 95 %, not currently breastfeeding.    General:  Alert and oriented to person, place and time   Airway: Normal oropharyngeal airway and neck mobility   Lungs:  Clear bilaterally   Heart:  Regular rate and rhythm   Abdomen: Soft, NT, ND, no masses   Extremities: Warm, good capillary refill    ASA Grade: II (mild systemic disease)    Impression: Cleared for use of conscious sedation for colorectal cancer screening    Plan: Proceed with colonoscopy     Penelope  Phu Garrett MD  [facility]  [phone]

## 2022-10-12 LAB
PATH REPORT.COMMENTS IMP SPEC: NORMAL
PATH REPORT.COMMENTS IMP SPEC: NORMAL
PATH REPORT.FINAL DX SPEC: NORMAL
PATH REPORT.GROSS SPEC: NORMAL
PATH REPORT.MICROSCOPIC SPEC OTHER STN: NORMAL
PATH REPORT.RELEVANT HX SPEC: NORMAL
PHOTO IMAGE: NORMAL

## 2022-10-22 ENCOUNTER — HEALTH MAINTENANCE LETTER (OUTPATIENT)
Age: 67
End: 2022-10-22

## 2023-04-01 ENCOUNTER — HEALTH MAINTENANCE LETTER (OUTPATIENT)
Age: 68
End: 2023-04-01

## 2023-06-08 ENCOUNTER — TRANSCRIBE ORDERS (OUTPATIENT)
Dept: OTHER | Age: 68
End: 2023-06-08

## 2023-06-08 DIAGNOSIS — M25.571 PAIN IN JOINT INVOLVING RIGHT ANKLE AND FOOT: Primary | ICD-10-CM

## 2024-01-03 ENCOUNTER — OFFICE VISIT (OUTPATIENT)
Dept: FAMILY MEDICINE | Facility: CLINIC | Age: 69
End: 2024-01-03
Payer: COMMERCIAL

## 2024-01-03 VITALS
RESPIRATION RATE: 15 BRPM | HEART RATE: 72 BPM | TEMPERATURE: 98.8 F | SYSTOLIC BLOOD PRESSURE: 162 MMHG | WEIGHT: 172 LBS | OXYGEN SATURATION: 95 % | BODY MASS INDEX: 33.77 KG/M2 | HEIGHT: 60 IN | DIASTOLIC BLOOD PRESSURE: 74 MMHG

## 2024-01-03 DIAGNOSIS — Z87.891 PERSONAL HISTORY OF TOBACCO USE: ICD-10-CM

## 2024-01-03 DIAGNOSIS — R06.83 SNORING: ICD-10-CM

## 2024-01-03 DIAGNOSIS — Z83.49 FAMILY HISTORY OF THYROID DISEASE: ICD-10-CM

## 2024-01-03 DIAGNOSIS — D33.3 VESTIBULAR SCHWANNOMA (H): ICD-10-CM

## 2024-01-03 DIAGNOSIS — Z00.00 ENCOUNTER FOR ANNUAL PHYSICAL EXAM: Primary | ICD-10-CM

## 2024-01-03 DIAGNOSIS — I10 BENIGN ESSENTIAL HYPERTENSION: ICD-10-CM

## 2024-01-03 DIAGNOSIS — R73.03 PREDIABETES: ICD-10-CM

## 2024-01-03 DIAGNOSIS — Z12.31 ENCOUNTER FOR SCREENING MAMMOGRAM FOR BREAST CANCER: ICD-10-CM

## 2024-01-03 DIAGNOSIS — Z00.00 ENCOUNTER FOR MEDICARE ANNUAL WELLNESS EXAM: ICD-10-CM

## 2024-01-03 DIAGNOSIS — H90.3 ASYMMETRICAL SENSORINEURAL HEARING LOSS: ICD-10-CM

## 2024-01-03 DIAGNOSIS — Z23 ENCOUNTER FOR IMMUNIZATION: ICD-10-CM

## 2024-01-03 LAB
ANION GAP SERPL CALCULATED.3IONS-SCNC: 12 MMOL/L (ref 7–15)
BUN SERPL-MCNC: 13 MG/DL (ref 8–23)
CALCIUM SERPL-MCNC: 9.8 MG/DL (ref 8.8–10.2)
CHLORIDE SERPL-SCNC: 101 MMOL/L (ref 98–107)
CREAT SERPL-MCNC: 0.73 MG/DL (ref 0.51–0.95)
DEPRECATED HCO3 PLAS-SCNC: 29 MMOL/L (ref 22–29)
EGFRCR SERPLBLD CKD-EPI 2021: 89 ML/MIN/1.73M2
GLUCOSE SERPL-MCNC: 83 MG/DL (ref 70–99)
HBA1C MFR BLD: 6.2 % (ref 0–5.6)
POTASSIUM SERPL-SCNC: 4.2 MMOL/L (ref 3.4–5.3)
SODIUM SERPL-SCNC: 142 MMOL/L (ref 135–145)
TSH SERPL DL<=0.005 MIU/L-ACNC: 2.86 UIU/ML (ref 0.3–4.2)

## 2024-01-03 PROCEDURE — 90480 ADMN SARSCOV2 VAC 1/ONLY CMP: CPT

## 2024-01-03 PROCEDURE — 80048 BASIC METABOLIC PNL TOTAL CA: CPT

## 2024-01-03 PROCEDURE — G0296 VISIT TO DETERM LDCT ELIG: HCPCS

## 2024-01-03 PROCEDURE — 83036 HEMOGLOBIN GLYCOSYLATED A1C: CPT

## 2024-01-03 PROCEDURE — 90677 PCV20 VACCINE IM: CPT

## 2024-01-03 PROCEDURE — 99214 OFFICE O/P EST MOD 30 MIN: CPT | Mod: 25

## 2024-01-03 PROCEDURE — 36415 COLL VENOUS BLD VENIPUNCTURE: CPT

## 2024-01-03 PROCEDURE — 84443 ASSAY THYROID STIM HORMONE: CPT

## 2024-01-03 PROCEDURE — G0009 ADMIN PNEUMOCOCCAL VACCINE: HCPCS

## 2024-01-03 PROCEDURE — G0402 INITIAL PREVENTIVE EXAM: HCPCS

## 2024-01-03 PROCEDURE — 91320 SARSCV2 VAC 30MCG TRS-SUC IM: CPT

## 2024-01-03 RX ORDER — AMLODIPINE BESYLATE 5 MG/1
10 TABLET ORAL DAILY
Qty: 60 TABLET | Refills: 3 | Status: SHIPPED | OUTPATIENT
Start: 2024-01-03 | End: 2024-10-01

## 2024-01-03 RX ORDER — LISINOPRIL 20 MG/1
20 TABLET ORAL DAILY
COMMUNITY
Start: 2023-11-29 | End: 2024-06-04

## 2024-01-03 ASSESSMENT — ENCOUNTER SYMPTOMS
CONSTIPATION: 0
DIZZINESS: 0
NAUSEA: 0
HEMATURIA: 0
FEVER: 0
HEADACHES: 0
COUGH: 0
SORE THROAT: 0
PALPITATIONS: 0
NERVOUS/ANXIOUS: 0
SHORTNESS OF BREATH: 0
ARTHRALGIAS: 0
DIARRHEA: 0
ABDOMINAL PAIN: 0
CHILLS: 0
WEAKNESS: 0
FREQUENCY: 0
JOINT SWELLING: 0
MYALGIAS: 0
EYE PAIN: 0
PARESTHESIAS: 0
HEMATOCHEZIA: 0
DYSURIA: 0
HEARTBURN: 0
BREAST MASS: 0

## 2024-01-03 ASSESSMENT — ACTIVITIES OF DAILY LIVING (ADL): CURRENT_FUNCTION: NO ASSISTANCE NEEDED

## 2024-01-03 ASSESSMENT — PAIN SCALES - GENERAL: PAINLEVEL: NO PAIN (0)

## 2024-01-03 NOTE — PATIENT INSTRUCTIONS
Consider fish oil supplement  Lung Cancer Screening   Frequently Asked Questions  If you are at high-risk for lung cancer, getting screened with low-dose computed tomography (LDCT) every year can help save your life. This handout offers answers to some of the most common questions about lung cancer screening. If you have other questions, please call [phone] ([phone]).     What is it?  Lung cancer screening uses special X-ray technology to create an image of your lung tissue. The exam is quick and easy and takes less than 10 seconds. We don t give you any medicine or use any needles. You can eat before and after the exam. You don t need to change your clothes as long as the clothing on your chest doesn t contain metal. But, you do need to be able to hold your breath for at least 6 seconds during the exam.    What is the goal of lung cancer screening?  The goal of lung cancer screening is to save lives. Many times, lung cancer is not found until a person starts having physical symptoms. Lung cancer screening can help detect lung cancer in the earliest stages when it may be easier to treat.    Who should be screened for lung cancer?  We suggest lung cancer screening for anyone who is at high-risk for lung cancer. You are in the high-risk group if you:      are between the ages of 55 and 79, and    have smoked at least 1 pack of cigarettes a day for 20 or more years, and    still smoke or have quit within the past 15 years.    However, if you have a new cough or shortness of breath, you should talk to your doctor before being screened.    Why does it matter if I have symptoms?  Certain symptoms can be a sign that you have a condition in your lungs that should be checked and treated by your doctor. These symptoms include fever, chest pain, a new or changing cough, shortness of breath that you have never felt before, coughing up blood or unexplained weight loss. Having any of these symptoms can greatly  affect the results of lung cancer screening.       Should all smokers get an LDCT lung cancer screening exam?  It depends. Lung cancer screening is for a very specific group of men and women who have a history of heavy smoking over a long period of time (see  Who should be screened for lung cancer  above).  I am in the high-risk group, but have been diagnosed with cancer in the past. Is LDCT lung cancer screening right for me?  In some cases, you should not have LDCT lung screening, such as when your doctor is already following your cancer with CT scan studies. Your doctor will help you decide if LDCT lung screening is right for you.  Do I need to have a screening exam every year?  Yes. If you are in the high-risk group described earlier, you should get an LDCT lung cancer screening exam every year until you are 79, or are no longer willing or able to undergo screening and possible procedures to diagnose and treat lung cancer.  How effective is LDCT at preventing death from lung cancer?  Studies have shown that LDCT lung cancer screening can lower the risk of death from lung cancer by 20 percent in people who are at high-risk.  What are the risks?  There are some risks and limitations of LDCT lung cancer screening. We want to make sure you understand the risks and benefits, so please let us know if you have any questions. Your doctor may want to talk with you more about these risks.    Radiation exposure: As with any exam that uses radiation, there is a very small increased risk of cancer. The amount of radiation in LDCT is small--about the same amount a person would get from a mammogram. Your doctor orders the exam when he or she feels the potential benefits outweigh the risks.    False negatives: No test is perfect, including LDCT. It is possible that you may have a medical condition, including lung cancer, that is not found during your exam. This is called a false negative result.    False positives and more  testing: LDCT very often finds something in the lung that could be cancer, but in fact is not. This is called a false positive result. False positive tests often cause anxiety. To make sure these findings are not cancer, you may need to have more tests. These tests will be done only if you give us permission. Sometimes patients need a treatment that can have side effects, such as a biopsy. For more information on false positives, see  What can I expect from the results?     Findings not related to lung cancer: Your LDCT exam also takes pictures of areas of your body next to your lungs. In a very small number of cases, the CT scan will show an abnormal finding in one of these areas, such as your kidneys, adrenal glands, liver or thyroid. This finding may not be serious, but you may need more tests. Your doctor can help you decide what other tests you may need, if any.  What can I expect from the results?  About 1 out of 4 LDCT exams will find something that may need more tests. Most of the time, these findings are lung nodules. Lung nodules are very small collections of tissue in the lung. These nodules are very common, and the vast majority--more than 97 percent--are not cancer (benign). Most are normal lymph nodes or small areas of scarring from past infections.  But, if a small lung nodule is found to be cancer, the cancer can be cured more than 90 percent of the time. To know if the nodule is cancer, we may need to get more images before your next yearly screening exam. If the nodule has suspicious features (for example, it is large, has an odd shape or grows over time), we will refer you to a specialist for further testing.  Will my doctor also get the results?  Yes. Your doctor will get a copy of your results.  Is it okay to keep smoking now that there s a cancer screening exam?  No. Tobacco is one of the strongest cancer-causing agents. It causes not only lung cancer, but other cancers and cardiovascular  (heart) diseases as well. The damage caused by smoking builds over time. This means that the longer you smoke, the higher your risk of disease. While it is never too late to quit, the sooner you quit, the better.  Where can I find help to quit smoking?  The best way to prevent lung cancer is to stop smoking. If you have already quit smoking, congratulations and keep it up! For help on quitting smoking, please call [employer] at 3-772-QUITNOW ([phone]) or the American [employer] Society at 1-644.265.4755 to find local resources near you.  One-on-one health coaching:  If you d prefer to work individually with a health care provider on tobacco cessation, we offer:      Medication Therapy Management:  Our specially trained pharmacists work closely with you and your doctor to help you quit smoking.  Call [phone] or [phone] (toll free).    Patient Education   Personalized Prevention Plan  You are due for the preventive services outlined below.  Your care team is available to assist you in scheduling these services.  If you have already completed any of these items, please share that information with your care team to update in your medical record.  Health Maintenance Due   Topic Date Due     Osteoporosis Screening  Never done     ANNUAL REVIEW OF HM ORDERS  Never done     Hepatitis C Screening  Never done     Cholesterol Lab  Never done     RSV VACCINE (Pregnancy & 60+) (1 - 1-dose 60+ series) Never done     LUNG CANCER SCREENING  06/16/2021     Hearing Loss: Care Instructions  Overview     Hearing loss is a sudden or slow decrease in how well you hear. It can range from slight to profound. Permanent hearing loss can occur with aging. It also can happen when you are exposed long-term to loud noise. Examples include listening to loud music, riding motorcycles, or being around other loud machines.  Hearing loss can affect your work and home life. It can make you feel lonely or depressed. You may feel that you  have lost your independence. But hearing aids and other devices can help you hear better and feel connected to others.  Follow-up care is a key part of your treatment and safety. Be sure to make and go to all appointments, and call your doctor if you are having problems. It's also a good idea to know your test results and keep a list of the medicines you take.  How can you care for yourself at home?  Avoid loud noises whenever possible. This helps keep your hearing from getting worse.  Always wear hearing protection around loud noises.  Wear a hearing aid as directed.  A professional can help you pick a hearing aid that will work best for you.  You can also get hearing aids over the counter for mild to moderate hearing loss.  Have hearing tests as your doctor suggests. They can show whether your hearing has changed. Your hearing aid may need to be adjusted.  Use other devices as needed. These may include:  Telephone amplifiers and hearing aids that can connect to a television, stereo, radio, or microphone.  Devices that use lights or vibrations. These alert you to the doorbell, a ringing telephone, or a baby monitor.  Television closed-captioning. This shows the words at the bottom of the screen. Most new TVs can do this.  TTY (text telephone). This lets you type messages back and forth on the telephone instead of talking or listening. These devices are also called TDD. When messages are typed on the keyboard, they are sent over the phone line to a receiving TTY. The message is shown on a monitor.  Use text messaging, social media, and email if it is hard for you to communicate by telephone.  Try to learn a listening technique called speechreading. It is not lipreading. You pay attention to people's gestures, expressions, posture, and tone of voice. These clues can help you understand what a person is saying. Face the person you are talking to, and have them face you. Make sure the lighting is good. You need to see  "the other person's face clearly.  Think about counseling if you need help to adjust to your hearing loss.  When should you call for help?  Watch closely for changes in your health, and be sure to contact your doctor if:    You think your hearing is getting worse.     You have new symptoms, such as dizziness or nausea.   Where can you learn more?  Go to https://www.[employer].net/patiented  Enter R798 in the search box to learn more about \"Hearing Loss: Care Instructions.\"  Current as of: February 28, 2023               Content Version: 13.8    5812-7531 [employer].   Care instructions adapted under license by your healthcare professional. If you have questions about a medical condition or this instruction, always ask your healthcare professional. [employer] disclaims any warranty or liability for your use of this information.         "

## 2024-01-03 NOTE — PROGRESS NOTES
"SUBJECTIVE:   Herminia is a 68 year old, presenting for the following:  Wellness Visit        1/3/2024     2:19 PM   Additional Questions   Roomed by Brandy Minaya         1/3/2024     2:19 PM   Patient Reported Additional Medications   Patient reports taking the following new medications D3 Supplement 125mg       Worsening hearing in the left ear. Dizziness flares up at times.  Vestibular schwanoma PT and surgery in August. She is wondering about     Are you in the first 12 months of your Medicare coverage?  Yes,  Visual Acuity:  Right Eye: 20/32   Left Eye: 10/8  Both Eyes: 20/16    Healthy Habits:     In general, how would you rate your overall health?  Good    Frequency of exercise:  4-5 days/week    Duration of exercise:  15-30 minutes    Do you usually eat at least 4 servings of fruit and vegetables a day, include whole grains    & fiber and avoid regularly eating high fat or \"junk\" foods?  Yes    Taking medications regularly:  Yes    Ability to successfully perform activities of daily living:  No assistance needed    Home Safety:  No safety concerns identified    Hearing Impairment:  Difficulty following a conversation in a noisy restaurant or crowded room, feel that people are mumbling or not speaking clearly, difficulty following dialogue in the theater, difficult to understand a speaker at a public meeting or [employer] service, need to ask people to speak up or repeat themselves and difficulty understanding soft or whispered speech    In the past 6 months, have you been bothered by leaking of urine?  No    In general, how would you rate your overall mental or emotional health?  Excellent    Additional concerns today:  No    Exercises: Ankle, head turning exercises, walking, ankle strengthening. Arm lifting. Treadmill 30  Diet: milk, oranges, grapes, bananas, oatmeal. Vegetables-mostly cucumbers and green peppers, lettuce  Hamburger 1x/week, chicken, turkey sandwiches.    Retired in October: trying to figure " out what to do. Was a  at the [employer]  Single  2 daughters- Harper (43)-lives in [place], Dana (40)-lives in area-nurse. Babysitting niece's 15 month old.    Dexa scan: March 2022. Due in 6445-5160. History of osteopenia.  Colonoscopy: Due in 2027.  MA: due this year.    Today's PHQ-2 Score:       1/3/2024     2:08 PM   PHQ-2 ( 1999 [employer])   Q1: Little interest or pleasure in doing things 0   Q2: Feeling down, depressed or hopeless 0   PHQ-2 Score 0   Q1: Little interest or pleasure in doing things Not at all   Q2: Feeling down, depressed or hopeless Not at all   PHQ-2 Score 0           Have you ever done Advance Care Planning? (For example, a Health Directive, POLST, or a discussion with a medical provider or your loved ones about your wishes): Yes, advance care planning is on file.      Fall risk  Fallen 2 or more times in the past year?: No  Any fall with injury in the past year?: No  click delete button to remove this line now  Cognitive Screening   1) Repeat 3 items (Leader, Season, Table)    2) Clock draw: NORMAL  3) 3 item recall: Recalls 3 objects  Results: 3 items recalled: COGNITIVE IMPAIRMENT LESS LIKELY    Mini-CogTM Copyright S Miguel. Licensed by the author for use in [facility] [place]; reprinted with permission (raj@.[facility]). All rights reserved.      Do you have sleep apnea, excessive snoring or daytime drowsiness? : yes    Reviewed and updated as needed this visit by clinical staff   Tobacco  Allergies  Meds   Med Hx  Surg Hx           Reviewed and updated as needed this visit by Provider   Tobacco     Med Hx  Surg Hx          Social History     Tobacco Use    Smoking status: Every Day     Packs/day: 0.50     Years: 25.00     Additional pack years: 0.00     Total pack years: 12.50     Types: Cigarettes    Smokeless tobacco: Never   Substance Use Topics    Alcohol use: Yes     Comment: occasional use- once every other week- 1-3 drinks (social)             1/3/2024      2:07 PM   Alcohol Use   Prescreen: >3 drinks/day or >7 drinks/week? No          No data to display                Do you have a current opioid prescription? No  Do you use any other controlled substances or medications that are not prescribed by a provider? Alcohol      Current providers sharing in care for this patient include:   Patient Care Team:  No Ref-Primary, Physician as PCP - Wilfrid Bowden MD as Referring Physician (Otolaryngology)    The following health maintenance items are reviewed in Epic and correct as of today:  Health Maintenance   Topic Date Due    DEXA  Never done    ANNUAL REVIEW OF HM ORDERS  Never done    HEPATITIS C SCREENING  Never done    LIPID  Never done    RSV VACCINE (Pregnancy & 60+) (1 - 1-dose 60+ series) Never done    LUNG CANCER SCREENING  06/16/2021    MEDICARE ANNUAL WELLNESS VISIT  06/21/2024    MAMMO SCREENING  09/14/2024    NICOTINE/TOBACCO CESSATION COUNSELING Q 1 YR  01/03/2025    FALL RISK ASSESSMENT  01/03/2025    COLORECTAL CANCER SCREENING  10/10/2027    ADVANCE CARE PLANNING  01/03/2029    DTAP/TDAP/TD IMMUNIZATION (3 - Td or Tdap) 06/21/2033    PHQ-2 (once per calendar year)  Completed    INFLUENZA VACCINE  Completed    Pneumococcal Vaccine: 65+ Years  Completed    ZOSTER IMMUNIZATION  Completed    COVID-19 Vaccine  Completed    IPV IMMUNIZATION  Aged Out    HPV IMMUNIZATION  Aged Out    MENINGITIS IMMUNIZATION  Aged Out    RSV MONOCLONAL ANTIBODY  Aged Out           1/3/2024     2:09 PM   Breast CA Risk Assessment (FHS-7)   Do you have a family history of breast, colon, or ovarian cancer? No / Unknown       click delete button to remove this line now  Mammogram Screening: Recommended mammography every 1-2 years with patient discussion and risk factor consideration  Pertinent mammograms are reviewed under the imaging tab.    Review of Systems   Constitutional:  Negative for chills and fever.   HENT:  Positive for hearing loss. Negative for ear pain and sore  "throat.    Eyes:  Negative for pain and visual disturbance.   Respiratory:  Negative for cough and shortness of breath.    Cardiovascular:  Negative for chest pain, palpitations and peripheral edema.   Gastrointestinal:  Negative for abdominal pain, constipation, diarrhea, heartburn, hematochezia and nausea.   Breasts:  Negative for tenderness, breast mass and discharge.   Genitourinary:  Negative for dysuria, frequency, genital sores, hematuria, pelvic pain, urgency, vaginal bleeding and vaginal discharge.   Musculoskeletal:  Negative for arthralgias, joint swelling and myalgias.   Skin:  Negative for rash.   Neurological:  Negative for dizziness, weakness, headaches and paresthesias.   Psychiatric/Behavioral:  Negative for mood changes. The patient is not nervous/anxious.      Constitutional, HEENT, cardiovascular, pulmonary, gi and gu systems are negative, except as otherwise noted.    OBJECTIVE:   BP (!) 162/74   Pulse 72   Temp 98.8  F (37.1  C) (Temporal)   Resp 15   Ht 1.523 m (4' 11.96\")   Wt 78 kg (172 lb)   LMP  (LMP Unknown)   SpO2 95%   BMI 33.64 kg/m   Estimated body mass index is 33.64 kg/m  as calculated from the following:    Height as of this encounter: 1.523 m (4' 11.96\").    Weight as of this encounter: 78 kg (172 lb).  Physical Exam  GENERAL: healthy, alert and no distress  EYES: Eyes grossly normal to inspection, PERRL and conjunctivae and sclerae normal  HENT: ear canals and TM's normal, nose and mouth without ulcers or lesions  NECK: no adenopathy, no asymmetry, masses, or scars and thyroid normal to palpation  RESP: lungs clear to auscultation - no rales, rhonchi or wheezes  BREAST: normal without masses, tenderness or nipple discharge and no palpable axillary masses or adenopathy  CV: regular rate and rhythm, normal S1 S2, no S3 or S4, no murmur, click or rub, no peripheral edema and peripheral pulses strong  ABDOMEN: soft, nontender, no hepatosplenomegaly, no masses and bowel sounds " "normal  MS: no gross musculoskeletal defects noted, no edema  SKIN: no suspicious lesions or rashes  NEURO: Normal strength and tone, mentation intact and speech normal  PSYCH: mentation appears normal, affect normal/bright      ASSESSMENT / PLAN:   (Z00.00) Encounter for annual physical exam  (primary encounter diagnosis)    (D33.3) Vestibular schwannoma (H)  Plan: Adult ENT  Referra  History of vestibular schwannoma with removal in 2014.  Given dizziness and history of tinnitus on the right ear with hearing loss.    (Z83.49) Family history of thyroid disease  Plan: TSH with free T4 reflex    (I10) Benign essential hypertension  Chronic, uncontrolled  Plan: Basic metabolic panel  (Ca, Cl, CO2, Creat,         Gluc, K, Na, BUN), amLODIPine (NORVASC) 5 MG         Tablet  Chronic uncontrolled hypertension as blood pressure is elevated.  I advised patient to monitor blood pressure at home, and increase amlodipine to 10 mg daily.  Recommended follow-up in 1 month if no improvement    (H90.3) Asymmetrical sensorineural hearing loss  Plan: Adult ENT  Referral, Adult Audiology          Referral    (Z23) Encounter for immunization  Plan: Pneumococcal 20 Valent Conjugate (PCV20),         COVID-19 12+ (2023-24) (PFIZER)    (R73.03) Prediabetes  Plan: Hemoglobin A1c      (R06.83) Snoring  Plan: Adult Sleep Eval & Management          Referral    (Z12.31) Encounter for screening mammogram for breast cancer  Plan: *MA Screening Digital Bilateral    Split billing discussed      COUNSELING:  Reviewed preventive health counseling, as reflected in patient instructions       Regular exercise       Healthy diet/nutrition       Consider lung cancer screening for ages 55-80 years (77 for Medicare) and 20 pack-year smoking history        BMI:   Estimated body mass index is 33.64 kg/m  as calculated from the following:    Height as of this encounter: 1.523 m (4' 11.96\").    Weight as of this encounter: 78 " kg (172 lb).         She reports that she has been smoking cigarettes. She has a 12.5 pack-year smoking history. She has never used smokeless tobacco.  Nicotine/Tobacco Cessation Plan:   Offered patient tobacco cessation strategies.  She feels she is not ready placed to quit smoking yet      Appropriate preventive services were discussed with this patient, including applicable screening as appropriate for fall prevention, nutrition, physical activity, Tobacco-use cessation, weight loss and cognition.  Checklist reviewing preventive services available has been given to the patient.    Reviewed patients plan of care and provided an AVS. The for Herminia meets the Care Plan requirement. This Care Plan has been established and reviewed with the Patient.          Roderick Benson NP  [facility]    Identified Health Risks:  I have reviewed Opioid Use Disorder and Substance Use Disorder risk factors and made any needed referrals.   Lung Cancer Screening Shared Decision Making Visit     Herminia Pastor, a 68 year old female, is eligible for lung cancer screening    History   Smoking Status    Every Day    Packs/day: 0.50    Years: 25.00    Types: Cigarettes   Smokeless Tobacco    Never       I have discussed with patient the risks and benefits of screening for lung cancer with low-dose CT.     The risks include:    radiation exposure: one low dose chest CT has as much ionizing radiation as about 15 chest x-rays, or 6 months of background radiation living in [place]      false positives: most findings/nodules are NOT cancer, but some might still require additional diagnostic evaluation, including biopsy    over-diagnosis: some slow growing cancers that might never have been clinically significant will be detected and treated unnecessarily     The benefit of early detection of lung cancer is contingent upon adherence to annual screening or more frequent follow up if indicated.     Furthermore, to benefit  from screening, Herminia must be willing and able to undergo diagnostic procedures, if indicated. Although no specific guide is available for determining severity of comorbidities, it is reasonable to withhold screening in patients who have greater mortality risk from other diseases.     We did discuss that the best way to prevent lung cancer is to not smoke.    Some patients may value a numeric estimation of lung cancer risk when evaluating if lung cancer screening is right for them, here is one calculator:    ShouldIScreen  The patient was provided with written information regarding signs of hearing loss.

## 2024-01-04 ENCOUNTER — TELEPHONE (OUTPATIENT)
Dept: OTOLARYNGOLOGY | Facility: CLINIC | Age: 69
End: 2024-01-04
Payer: COMMERCIAL

## 2024-01-04 NOTE — TELEPHONE ENCOUNTER
"Writer spoke with pt about re-establishing with [facility] team for post-surgical follow-up of vestibular schwannoma, pt did not want to schedule at this time, stating \"it is not necessary.\" Writer encourage pt to call back if she'd like to follow-up at any time or if she's have any new or worsening symptoms.   "

## 2024-01-04 NOTE — TELEPHONE ENCOUNTER
[facility] Call Center    Phone Message    May a detailed message be left on voicemail: no     Reason for Call: Appointment Intake    Referring Provider Name: Roderick Benson NP  Diagnosis and/or Symptoms: D33.3 (ICD-10-CM) - Vestibular schwannoma (H)   H90.3 (ICD-10-CM) - Asymmetrical sensorineural hearing loss     Recommended for follow-up related to vestibular schwanoma- had surgery in August/PT. Interested in hearing aid eval       [facility].    Action Taken: Other: ENt    Travel Screening: Not Applicable

## 2024-01-04 NOTE — TELEPHONE ENCOUNTER
Pt called writer's direct line informing writer that she has [employer] and they have a specific company she'll need to go through in order for hearing aid coverage. 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 Writer will call back to determine if patient wishes to establish with [facility] team.

## 2024-01-16 ENCOUNTER — ANCILLARY PROCEDURE (OUTPATIENT)
Dept: CT IMAGING | Facility: CLINIC | Age: 69
End: 2024-01-16
Payer: COMMERCIAL

## 2024-01-16 DIAGNOSIS — Z87.891 PERSONAL HISTORY OF TOBACCO USE: ICD-10-CM

## 2024-01-16 PROCEDURE — 71271 CT THORAX LUNG CANCER SCR C-: CPT | Performed by: RADIOLOGY

## 2024-01-18 ASSESSMENT — SLEEP AND FATIGUE QUESTIONNAIRES
HOW LIKELY ARE YOU TO NOD OFF OR FALL ASLEEP IN A CAR, WHILE STOPPED FOR A FEW MINUTES IN TRAFFIC: WOULD NEVER DOZE
HOW LIKELY ARE YOU TO NOD OFF OR FALL ASLEEP WHILE SITTING INACTIVE IN A PUBLIC PLACE: WOULD NEVER DOZE
HOW LIKELY ARE YOU TO NOD OFF OR FALL ASLEEP WHILE SITTING AND TALKING TO SOMEONE: WOULD NEVER DOZE
HOW LIKELY ARE YOU TO NOD OFF OR FALL ASLEEP WHILE SITTING AND READING: HIGH CHANCE OF DOZING
HOW LIKELY ARE YOU TO NOD OFF OR FALL ASLEEP WHILE SITTING QUIETLY AFTER LUNCH WITHOUT ALCOHOL: SLIGHT CHANCE OF DOZING
HOW LIKELY ARE YOU TO NOD OFF OR FALL ASLEEP WHEN YOU ARE A PASSENGER IN A CAR FOR AN HOUR WITHOUT A BREAK: WOULD NEVER DOZE
HOW LIKELY ARE YOU TO NOD OFF OR FALL ASLEEP WHILE LYING DOWN TO REST IN THE AFTERNOON WHEN CIRCUMSTANCES PERMIT: HIGH CHANCE OF DOZING
HOW LIKELY ARE YOU TO NOD OFF OR FALL ASLEEP WHILE WATCHING TV: HIGH CHANCE OF DOZING

## 2024-01-19 ENCOUNTER — OFFICE VISIT (OUTPATIENT)
Dept: SLEEP MEDICINE | Facility: CLINIC | Age: 69
End: 2024-01-19
Payer: COMMERCIAL

## 2024-01-19 VITALS
OXYGEN SATURATION: 97 % | WEIGHT: 174.5 LBS | DIASTOLIC BLOOD PRESSURE: 78 MMHG | RESPIRATION RATE: 15 BRPM | HEART RATE: 77 BPM | HEIGHT: 60 IN | SYSTOLIC BLOOD PRESSURE: 134 MMHG | BODY MASS INDEX: 34.26 KG/M2

## 2024-01-19 DIAGNOSIS — R06.83 SNORING: Primary | ICD-10-CM

## 2024-01-19 DIAGNOSIS — G47.19 EXCESSIVE DAYTIME SLEEPINESS: ICD-10-CM

## 2024-01-19 DIAGNOSIS — G47.09 OTHER INSOMNIA: ICD-10-CM

## 2024-01-19 DIAGNOSIS — G47.21 CIRCADIAN RHYTHM SLEEP DISORDER, DELAYED SLEEP PHASE TYPE: ICD-10-CM

## 2024-01-19 PROCEDURE — 99204 OFFICE O/P NEW MOD 45 MIN: CPT | Performed by: INTERNAL MEDICINE

## 2024-01-19 NOTE — PATIENT INSTRUCTIONS
"          MY TREATMENT INFORMATION FOR SLEEP APNEA-  Herminia Pastor    DOCTOR : Gia Ortiz MD    Am I having a home sleep study?  --->Watch the video for the device you are using:    -/drop off device-   https://www.EduKoala.com/watch?v=yGGFBdELGhk      Frequently asked questions:  1. What is Obstructive Sleep Apnea (TOM)? TOM is the most common type of sleep apnea. Apnea means, \"without breath.\"  Apnea is most often caused by narrowing or collapse of the upper airway as muscles relax during sleep.   Almost everyone has occasional apneas. Most people with sleep apnea have had brief interruptions at night frequently for many years.  The severity of sleep apnea is related to how frequent and severe the events are.   2. What are the consequences of TOM? Symptoms include: feeling sleepy during the day, snoring loudly, gasping or stopping of breathing, trouble sleeping, and occasionally morning headaches or heartburn at night.  Sleepiness can be serious and even increase the risk of falling asleep while driving. Other health consequences may include development of high blood pressure and other cardiovascular disease in persons who are susceptible. Untreated TOM  can contribute to heart disease, stroke and diabetes.   3. What are the treatment options? In most situations, sleep apnea is a lifelong disease that must be managed with daily therapy. Medications are not effective for sleep apnea and surgery is generally not considered until other therapies have been tried. Your treatment is your choice . Continuous Positive Airway (CPAP) works right away and is the therapy that is effective in nearly everyone. An oral device to hold your jaw forward is usually the next most reliable option. Other options include postioning devices (to keep you off your back), weight loss, and surgery including a tongue pacing device. There is more detail about some of these options below.  4. Are my sleep studies covered by " insurance? Although we will request verification of coverage, we advise you also check in advance of the study to ensure there is coverage.    Important tips for those choosing CPAP and similar devices  For new devices, sign up for device DEVANTE to monitor your device for your followup visits  We encourage you to utilize the [employer] devante or website (myAir web (resmed.com) ) to monitor your therapy progress and share the data with your healthcare team when you discuss your sleep apnea.                                                    Know your equipment:  CPAP is continuous positive airway pressure that prevents obstructive sleep apnea by keeping the throat from collapsing while you are sleeping. In most cases, the device is  smart  and can slowly self-adjusts if your throat collapses and keeps a record every day of how well you are treated-this information is available to you and your care team.  BPAP is bilevel positive airway pressure that keeps your throat open and also assists each breath with a pressure boost to maintain adequate breathing.  Special kinds of BPAP are used in patients who have inadequate breathing from lung or heart disease. In most cases, the device is  smart  and can slowly self-adjusts to assist breathing. Like CPAP, the device keeps a record of how well you are treated.  Your mask is your connection to the device. You get to choose what feels most comfortable and the staff will help to make sure if fits. Here: are some examples of the different masks that are available: Magnetic mask aids may assist with use but there are safety issues that should be addressed when considering with magnets* ( see end of discussion).       Key points to remember on your journey with sleep apnea:  Sleep study.  PAP devices often need to be adjusted during a sleep study to show that they are effective and adjusted right.  Good tips to remember: Try wearing just the mask during a quiet time during the day  so your body adapts to wearing it. A humidifier is recommended for comfort in most cases to prevent drying of your nose and throat. Allergy medication from your provider may help you if you are having nasal congestion.  Getting settled-in. It takes more than one night for most of us to get used to wearing a mask. Try wearing just the mask during a quiet time during the day so your body adapts to wearing it. A humidifier is recommended for comfort in most cases. Our team will work with you carefully on the first day and will be in contact within 4 days and again at 2 and 4 weeks for advice and remote device adjustments. Your therapy is evaluated by the device each day.   Use it every night. The more you are able to sleep naturally for 7-8 hours, the more likely you will have good sleep and to prevent health risks or symptoms from sleep apnea. Even if you use it 4 hours it helps. Occasionally all of us are unable to use a medical therapy, in sleep apnea, it is not dangerous to miss one night.   Communicate. Call our skilled team on the number provided on the first day if your visit for problems that make it difficult to wear the device. Over 2 out of 3 patients can learn to wear the device long-term with help from our team. Remember to call our team or your sleep providers if you are unable to wear the device as we may have other solutions for those who cannot adapt to mask CPAP therapy. It is recommended that you sleep your sleep provider within the first 3 months and yearly after that if you are not having problems.   Use it for your health. We encourage use of CPAP masks during daytime quiet periods to allow your face and brain to adapt to the sensation of CPAP so that it will be a more natural sensation to awaken to at night or during naps. This can be very useful during the first few weeks or months of adapting to CPAP though it does not help medically to wear CPAP during wakefulness and  should not be used as a  strategy just to meet guidelines.  Take care of your equipment. Make sure you clean your mask and tubing using directions every day and that your filter and mask are replaced as recommended or if they are not working.     *Masks with magnets:  Updated Contraindications  Masks with magnetic components are contraindicated for use by patients where they, or anyone in close physical contact while using the mask, have the following:   Active medical implants that interact with magnets (i.e., pacemakers, implantable cardioverter defibrillators (ICD), neurostimulators, cerebrospinal fluid (CSF) shunts, insulin/infusion pumps)   Metallic implants/objects containing ferromagnetic material (i.e., aneurysm clips/flow disruption devices, embolic coils, stents, valves, electrodes, implants to restore hearing or balance with implanted magnets, ocular implants, metallic splinters in the eye)  Updated Warning  Keep the mask magnets at a safe distance of at least 6 inches (150 mm) away from implants or medical devices that may be adversely affected by magnetic interference. This warning applies to you or anyone in close physical contact with your mask. The magnets are in the frame and lower headgear clips, with a magnetic field strength of up to 400mT. When worn, they connect to secure the mask but may inadvertently detach while asleep.  Implants/medical devices, including those listed within contraindications, may be adversely affected if they change function under external magnetic fields or contain ferromagnetic materials that attract/repel to magnetic fields (some metallic implants, e.g., contact lenses with metal, dental implants, metallic cranial plates, screws, katrin hole covers, and bone substitute devices). Consult your physician and  of your implant / other medical device for information on the potential adverse effects of magnetic fields.    BESIDES CPAP, WHAT OTHER THERAPIES ARE THERE?    Positioning  Device  Positioning devices are generally used when sleep apnea is mild and only occurs on your back.This example shows a pillow that straps around the waist. It may be appropriate for those whose sleep study shows milder sleep apnea that occurs primarily when lying flat on one's back. Preliminary studies have shown benefit but effectiveness at home may need to be verified by a home sleep test. These devices are generally not covered by medical insurance.  Examples of devices that maintain sleeping on the back to prevent snoring and mild sleep apnea.    Belt type body positioner  http://femeninas.[facility]/    Electronic reminder  http://nightshifttherapy.com/            Oral Appliance  What is oral appliance therapy?  An oral appliance device fits on your teeth at night like a retainer used after having braces. The device is made by a specialized dentist and requires several visits over 1-2 months before a manufactured device is made to fit your teeth and is adjusted to prevent your sleep apnea. Once an oral device is working properly, snoring should be improved. A home sleep test may be recommended at that time if to determine whether the sleep apnea is adequately treated.       Some things to remember:  -Oral devices are often, but not always, covered by your medical insurance. Be sure to check with your insurance provider.   -If you are referred for oral therapy, you will be given a list of specialized dentists to consider or you may choose to visit the Web site of the American Academy of Dental Sleep Medicine  -Oral devices are less likely to work if you have severe sleep apnea or are extremely overweight.     More detailed information  An oral appliance is a small acrylic device that fits over the upper and lower teeth  (similar to a retainer or a mouth guard). This device slightly moves jaw forward, which moves the base of the tongue forward, opens the airway, improves breathing for effective treat snoring and  obstructive sleep apnea in perhaps 7 out of 10 people .  The best working devices are custom-made by a dental device  after a mold is made of the teeth 1, 2, 3.  When is an oral appliance indicated?  Oral appliance therapy is recommended as a first-line treatment for patients with primary snoring, mild sleep apnea, and for patients with moderate sleep apnea who prefer appliance therapy to use of CPAP4, 5. Severity of sleep apnea is determined by sleep testing and is based on the number of respiratory events per hour of sleep.   How successful is oral appliance therapy?  The success rate of oral appliance therapy in patients with mild sleep apnea is 75-80% while in patients with moderate sleep apnea it is 50-70%. The chance of success in patients with severe sleep apnea is 40-50%. The research also shows that oral appliances have a beneficial effect on the cardiovascular health of TOM patients at the same magnitude as CPAP therapy7.  Oral appliances should be a second-line treatment in cases of severe sleep apnea, but if not completely successful then a combination therapy utilizing CPAP plus oral appliance therapy may be effective. Oral appliances tend to be effective in a broad range of patients although studies show that the patients who have the highest success are females, younger patients, those with milder disease, and less severe obesity. 3, 6.   Finding a dentist that practices dental sleep medicine  Specific training is available through the American Academy of Dental Sleep Medicine for dentists interested in working in the field of sleep. To find a dentist who is educated in the field of sleep and the use of oral appliances, near you, visit the Web site of the American Academy of Dental Sleep Medicine.    References  1. Karen et al. Objectively measured vs self-reported compliance during oral appliance therapy for sleep-disordered breathing. Chest 2013; 144(5): 5408-0470.  2. Mae  et al. Objective measurement of compliance during oral appliance therapy for sleep-disordered breathing. Thorax 2013; 68(1): 91-96.  3. Rm et al. Mandibular advancement devices in 620 men and women with TOM and snoring: tolerability and predictors of treatment success. Chest 2004; 125: 2121-6803.  4. George et al. Oral appliances for snoring and TOM: a review. Sleep 2006; 29: 244-262.  5. Consuelo et al. Oral appliance treatment for TOM: an update. J Clin Sleep Med 2014; 10(2): 215-227.  6. Eleuterio et al. Predictors of OSAH treatment outcome. J Dent Res 2007; 86: 8018-3278.      Weight Loss:    Weight loss is a long-term strategy that may improve sleep apnea in some patients.    Weight management is a personal decision and the decision should be based on your interest and the potential benefits.  If you are interested in exploring weight loss strategies, the following discussion covers the impact on weight loss on sleep apnea and the approaches that may be successful.    Being overweight does not necessarily mean you will have health consequences.  Those who have BMI over 35 or over 27 with existing medical conditions carries greater risk.   Weight loss decreases severity of sleep apnea in most people with obesity. For those with mild obesity who have developed snoring with weight gain, even 15-30 pound weight loss can improve and occasionally eliminate sleep apnea.  Structured and life-long dietary and health habits are necessary to lose weight and keep healthier weight levels.     Though there may be significant health benefits from weight loss, long-term weight loss is very difficult to achieve- studies show success with dietary management in less than 10% of people. In addition, substantial weight loss may require years of dietary control and may be difficult if patients have severe obesity. In these cases, surgical management may be considered.  Finally, older individuals who have tolerated  obesity without health complications may be less likely to benefit from weight loss strategies.        Surgery:    Surgery for obstructive sleep apnea is considered generally only when other therapies fail to work. Surgery may be discussed with you if you are having a difficult time tolerating CPAP and or when there is an abnormal structure that requires surgical correction.  Nose and throat surgeries often enlarge the airway to prevent collapse.  Most of these surgeries create pain for 1-2 weeks and up to half of the most common surgeries are not effective throughout life.  You should carefully discuss the benefits and drawbacks to surgery with your sleep provider and surgeon to determine if it is the best solution for you.   More information  Surgery for TOM is directed at areas that are responsible for narrowing or complete obstruction of the airway during sleep.  There are a wide range of procedures available to enlarge and/or stabilize the airway to prevent blockage of breathing in the three major areas where it can occur: the palate, tongue, and nasal regions.  Successful surgical treatment depends on the accurate identification of the factors responsible for obstructive sleep apnea in each person.  A personalized approach is required because there is no single treatment that works well for everyone.  Because of anatomic variation, consultation with an examination by a sleep surgeon is a critical first step in determining what surgical options are best for each patient.  In some cases, examination during sedation may be recommended in order to guide the selection of procedures.  Patients will be counseled about risks and benefits as well as the typical recovery course after surgery. Surgery is typically not a cure for a person s TOM.  However, surgery will often significantly improve one s TOM severity (termed  success rate ).  Even in the absence of a cure, surgery will decrease the cardiovascular risk  associated with OSA7; improve overall quality of life8 (sleepiness, functionality, sleep quality, etc).      Palate Procedures:  Patients with TOM often have narrowing of their airway in the region of their tonsils and uvula.  The goals of palate procedures are to widen the airway in this region as well as to help the tissues resist collapse.  Modern palate procedure techniques focus on tissue conservation and soft tissue rearrangement, rather than tissue removal.  Often the uvula is preserved in this procedure. Residual sleep apnea is common in patient after pharyngoplasty with an average reduction in sleep apnea events of 33%2.      Tongue Procedures:  ExamWhile patients are awake, the muscles that surround the throat are active and keep this region open for breathing. These muscles relax during sleep, allowing the tongue and other structures to collapse and block breathing.  There are several different tongue procedures available.  Selection of a tongue base procedure depends on characteristics seen on physical exam.  Generally, procedures are aimed at removing bulky tissues in this area or preventing the back of the tongue from falling back during sleep.  Success rates for tongue surgery range from 50-62%3.    Hypoglossal Nerve Stimulation:  Hypoglossal nerve stimulation has recently received approval from the [place] Food and Drug Administration for the treatment of obstructive sleep apnea.  This is based on research showing that the system was safe and effective in treating sleep apnea6.  Results showed that the median AHI score decreased 68%, from 29.3 to 9.0. This therapy uses an implant system that senses breathing patterns and delivers mild stimulation to airway muscles, which keeps the airway open during sleep.  The system consists of three fully implanted components: a small generator (similar in size to a pacemaker), a breathing sensor, and a stimulation lead.  Using a small handheld remote, a  patient turns the therapy on before bed and off upon awakening.    Candidates for this device must be greater than 18 years of age, have moderate to severe TOM (AHI between 15-65), BMI less than 35, have tried CPAP/oral appliance for at least 8 weeks without success, and have appropriate upper airway anatomy (determined by a sleep endoscopy performed by Dr. Raulito Yoder).    Hypoglossal Nerve Stimulation Pathway:    The sleep surgeon s office will work with the patient through the insurance prior-authorization process (including communications and appeals).    Nasal Procedures:  Nasal obstruction can interfere with nasal breathing during the day and night.  Studies have shown that relief of nasal obstruction can improve the ability of some patients to tolerate positive airway pressure therapy for obstructive sleep apnea1.  Treatment options include medications such as nasal saline, topical corticosteroid and antihistamine sprays, and oral medications such as antihistamines or decongestants. Non-surgical treatments can include external nasal dilators for selected patients. If these are not successful by themselves, surgery can improve the nasal airway either alone or in combination with these other options.      Combination Procedures:  Combination of surgical procedures and other treatments may be recommended, particularly if patients have more than one area of narrowing or persistent positional disease.  The success rate of combination surgery ranges from 66-80%2,3.    References  Hank KANG. The Role of the Nose in Snoring and Obstructive Sleep Apnoea: An Update.  Eur Arch Otorhinolaryngol. 2011; 268: 1365-73.   jL SM; Judy JA; Savita JR; Pallanch JF; Eliecer MB; Fabrice SG; Neftaly GORDON. Surgical modifications of the upper airway for obstructive sleep apnea in adults: a systematic review and meta-analysis. SLEEP 2010;33(10):2230-4783. Rigoberto HERNANDEZ. Hypopharyngeal surgery in obstructive sleep apnea: an  evidence-based medicine review.  Arch Otolaryngol Head Neck Surg. 2006 Feb;132(2):206-13.  Catalino YH1, Cain Y, Augusto MARCIA. The efficacy of anatomically based multilevel surgery for obstructive sleep apnea. Otolaryngol Head Neck Surg. 2003 Oct;129(4):327-35.  Rigoberto HERNANDEZ, Goldberg A. Hypopharyngeal Surgery in Obstructive Sleep Apnea: An Evidence-Based Medicine Review. Arch Otolaryngol Head Neck Surg. 2006 Feb;132(2):206-13.  Goran VILLEDA et al. Upper-Airway Stimulation for Obstructive Sleep Apnea.  N Engl J Med. 2014 Jan 9;370(2):139-49.  Mandie Y et al. Increased Incidence of Cardiovascular Disease in Middle-aged Men with Obstructive Sleep Apnea. Am J Respir Crit Care Med; 2002 166: 159-165  Bartonbridgette GARCIA et al. Studying Life Effects and Effectiveness of Palatopharyngoplasty (SLEEP) study: Subjective Outcomes of Isolated Uvulopalatopharyngoplasty. Otolaryngol Head Neck Surg. 2011; 144: 623-631.        WHAT IF I ONLY HAVE SNORING?    Mandibular advancement devices, lateral sleep positioning, long-term weight loss and treatment of nasal allergies have been shown to improve snoring.  Exercising tongue muscles with a game (https://[employer].Versly/us/devante/soundly-reduce-snoring/yl1000000819) or stimulating the tongue during the day with a device (https://doi.org/10.3390/qgx63712170) have improved snoring in some individuals.    Remember to Drive Safe... Drive Alive     Sleep health profoundly affects your health, mood, and your safety.  Thirty three percent of the population (one in three of us) is not getting enough sleep and many have a sleep disorder. Not getting enough sleep or having an untreated / undertreated sleep condition may make us sleepy without even knowing it. In fact, our driving could be dramatically impaired due to our sleep health. As your provider, here are some things I would like you to know about driving:     Here are some warning signs for impairment and dangerous drowsy driving:              -Having been  awake more than 16 hours               -Looking tired               -Eyelid drooping              -Head nodding (it could be too late at this point)              -Driving for more than 30 minutes     Some things you could do to make the driving safer if you are experiencing some drowsiness:              -Stop driving and rest              -Call for transportation              -Make sure your sleep disorder is adequately treated     Some things that have been shown NOT to work when experiencing drowsiness while driving:              -Turning on the radio              -Opening windows              -Eating any  distracting  /  entertaining  foods (e.g., sunflower seeds, candy, or any other)              -Talking on the phone      Your decision may not only impact your life, but also the life of others. Please, remember to drive safe for yourself and all of us.             Your BMI is Body mass index is 34.12 kg/m .    What is BMI?  Body mass index (BMI) is one way to tell whether you are at a healthy weight, overweight, or obese. It measures your weight in relation to your height.  A BMI of 18.5 to 24.9 is in the healthy range. A person with a BMI of 25 to 29.9 is considered overweight, and someone with a BMI of 30 or greater is considered obese.  Another way to find out if you are at risk for health problems caused by overweight and obesity is to measure your waist. If you are a woman and your waist is more than 35 inches, or if you are a man and your waist is more than 40 inches, your risk of disease may be higher.  More than two-thirds of American adults are considered overweight or obese. Being overweight or obese increases the risk for further weight gain.  Excess weight may lead to heart disease and diabetes. Creating and following plans for healthy eating and physical activity may help you improve your health.    Methods for maintaining or losing weight.  Weight control is part of healthy lifestyle and includes  exercise, emotional health, and healthy eating habits.  Careful eating habits lifelong is the mainstay of weight control.  Though there are significant health benefits from weight loss, long-term weight loss with diet alone may be very difficult to achieve- studies show long-term success with dietary management in less than 10% of people. Attaining a healthy weight may be especially difficult to achieve in those with severe obesity. In some cases, medications, devices and surgical management might be considered.    What can you do?  If you are overweight or obese and are interested in methods for weight loss, you should discuss this with your provider. In addition, we recommend that you review healthy life styles and methods for weight loss available through the National Institutes of Health patient information sites:   http://win.niddk.nih.gov/publications/index.htm

## 2024-01-19 NOTE — NURSING NOTE
Scheduled HST for 2/14/2024 with 3 month follow up with Dr. Ortiz on 5/3/2024. Printed AVS.     Fouzia Deangelo   Clinic Assistant  [facility]

## 2024-01-19 NOTE — PROGRESS NOTES
Chief complaint: Consultation requested by Roderick Benson NP for evaluation of snoring in the setting of hypertension    History of Present Illness: 68-year-old female with history of hypertension describes long history of disruptive snoring.  She no one [place] to [place] due to her loud snoring.  She has recently retired and has developed some insomnia.  Prior to retiring she go to bed around 10 PM and get up around 6 to 6:30 in the morning with an alarm.  She will often take a nap after work around 5 PM.  Currently she will go to bed around 10 but it takes her hours to fall asleep.  Once she finally falls asleep she typically will get up maybe once a night to go to the bathroom.  She has been sleeping in until sometimes as late as noon.  She will use an alarm if she needs to get up for an appointment.  She wants to be able to get up at 8:00  AM routinely.  During the day she drinks decaf coffee and is not taking naps.  At night she denies waking up with shortness of breath, gasping or choking.  She does wake up in the morning with a sense of jaw clenching.  She can have dry mouth in the morning as well.  She usually starts out sleeping on her right side and then go to her left and back.  She is not drinking alcohol before bed routinely or taking any sleep aids.    She denies restless legs, sleepwalking, sleep talking or dream enactment behavior.  She is having more vivid dreams than she had prior to retiring but did not clearly nightmares.    She has had weight gain in the last 20 years and difficulty losing weight.  She has some difficulty with balance which keeps her from getting as much exercise as she would like.    [place] Sleepiness Scale   Sitting and reading: High chance of dozing   Watching TV: High chance of dozing   Sitting, inactive in a public place (e.g. a theatre or a meeting): Would never doze   As a passenger in a car for an hour without a break: Would never doze    Lying down to rest in the afternoon when circumstances permit: High chance of dozing   Sitting and talking to someone: Would never doze   Sitting quietly after a lunch without alcohol: Slight chance of dozing   In a car, while stopped for a few minutes in traffic: Would never doze   Total score - [place]: 10   (Less than 10 normal)    Insomnia Severity Scale  BEN Total Score: 8  Total score categories:  0-7 = No clinically significant insomnia   8-14 = Subthreshold insomnia   15-21 = Clinical insomnia (moderate severity)  22-28 = Clinical insomnia (severe)    STOP-BANG  Loud Snore   1  Excessively Tired/Sleepy   1  Observed apnea   0  Hypertension   1  BMI> 35 kg/m2   0  Age >50   1  Neck >16 in/40cm   0  Male Gender   0  Total =   4  (0-2 low, 3-4 intermediate, 5-8 high risk of TOM)      Past Medical History:   Diagnosis Date    Acoustic neuroma (H)     Allergic rhinitis Jan 1980    Dry throat     Gastro-oesophageal reflux disease     Hearing loss     Heartburn     Hypertension     Itchy eyes     Night sweats     Ringing in ears     Sleep apnea Jan 2010    Sneezing     Snoring     Tinnitus     Visual loss     Weight gain        Allergies   Allergen Reactions    Dust Mites     Grass     Mold     Ragweeds Other (See Comments)     Crustiness in nose, headaches    Trees        Current Outpatient Medications   Medication    amLODIPine (NORVASC) 5 MG tablet    CALCIUM PO    chlorpheniramine-pseudoePHEDrine (PSEUDO-GEST PLUS) 4-60 MG TABS    Glucosamine-Chondroitin (GLUCOSAMINE CHONDR COMPLEX PO)    lisinopril (ZESTRIL) 20 MG tablet    OMEPRAZOLE PO    rosuvastatin (CRESTOR) 10 MG tablet     No current facility-administered medications for this visit.       Social History     Socioeconomic History    Marital status:      Spouse name: Not on file    Number of children: Not on file    Years of education: Not on file    Highest education level: Not on file   Occupational History    Not on file   Tobacco Use     Smoking status: Every Day     Packs/day: 0.50     Years: 25.00     Additional pack years: 0.00     Total pack years: 12.50     Types: Cigarettes    Smokeless tobacco: Never   Vaping Use    Vaping Use: Never used   Substance and Sexual Activity    Alcohol use: Yes     Comment: occasional use- once every other week- 1-3 drinks (social)    Drug use: No    Sexual activity: Not Currently   Other Topics Concern    Parent/sibling w/ CABG, MI or angioplasty before 65F 55M? Not Asked   Social History Narrative    Not on file     Social Determinants of Health     Financial Resource Strain: Low Risk  (1/3/2024)    Financial Resource Strain     Within the past 12 months, have you or your family members you live with been unable to get utilities (heat, electricity) when it was really needed?: No   Food Insecurity: Low Risk  (1/3/2024)    Food Insecurity     Within the past 12 months, did you worry that your food would run out before you got money to buy more?: No     Within the past 12 months, did the food you bought just not last and you didn t have money to get more?: No   Transportation Needs: Low Risk  (1/3/2024)    Transportation Needs     Within the past 12 months, has lack of transportation kept you from medical appointments, getting your medicines, non-medical meetings or appointments, work, or from getting things that you need?: No   Physical Activity: Not on file   Stress: Not on file   Social Connections: Not on file   Interpersonal Safety: Low Risk  (1/3/2024)    Interpersonal Safety     Do you feel physically and emotionally safe where you currently live?: Yes     Within the past 12 months, have you been hit, slapped, kicked or otherwise physically hurt by someone?: No     Within the past 12 months, have you been humiliated or emotionally abused in other ways by your partner or ex-partner?: No   Housing Stability: Low Risk  (1/3/2024)    Housing Stability     Do you have housing? : Yes     Are you worried about  "losing your housing?: No       Family History   Problem Relation Age of Onset    Hypertension Mother     Cerebrovascular Disease Mother     Alzheimer Disease Mother     Depression Mother     Psychotic Disorder Mother     Dementia Mother 60    Snoring Father     Sleep Apnea Brother     Diabetes Maternal Grandmother     Diabetes Paternal Grandmother     Thyroid Disease Maternal Uncle          EXAM:  /78   Pulse 77   Resp 15   Ht 1.523 m (4' 11.96\")   Wt 79.2 kg (174 lb 8 oz)   LMP  (LMP Unknown)   SpO2 97%   BMI 34.12 kg/m    GENERAL: Alert and no distress  EYES: Eyes grossly normal to inspection.  No discharge or erythema, or obvious scleral/conjunctival abnormalities.  Oropharynx Mallampati 3, large sulema  Neck is supple without lymphadenopathy  RESP: Lungs are clear to auscultation bilaterally  Cardiac tones regular rate and rhythm S1 is 2 normal  Extremities are perfused without edema  SKIN: Visible skin clear. No significant rash, abnormal pigmentation or lesions.  NEURO: Cranial nerves grossly intact.  Mentation and speech appropriate for age.  PSYCH: Appropriate affect, tone, and pace of words         TSH   Date Value Ref Range Status   01/03/2024 2.86 0.30 - 4.20 uIU/mL Final           ASSESSMENT:  68-year-old female with probable circadian rhythm delay that now contributing to insomnia given that she is no longer on a restricted work schedule.  She also has snoring and risk factors for sleep apnea.  Identifying and treating sleep apnea be medically indicated.    PLAN:  We reviewed the pathophysiology of obstructive sleep apnea, testing options, indications for treatment and treatment options.  Recommended home sleep apnea testing.  Patient is interested in oral appliance therapy if that is an option.  Discussed the importance of allowing herself to stay up later.  She could try bedtimes of midnight to 1 AM.  See instructions for further details of counseling provided.      50 minutes spent by me " on the date of the encounter doing chart review, history and exam, documentation and further activities per the note    Gia Ortiz M.D.  Pulmonary/Critical Care/Sleep Medicine    [facility]   Floor 1, [address]   [address] [place], [place] [address]. [place] [address]   Appointments: [phone]    The above note was dictated using voice recognition software and may include typographical errors. Please contact the author for any clarifications.

## 2024-01-19 NOTE — LETTER
1/19/2024         RE: Herminia Pastor  [address] [facility] [address]  [place] [address]        Dear Colleague,    Thank you for referring your patient, Herminia Pastor, to the [facility] [place]. Please see a copy of my visit note below.    Chief complaint: Consultation requested by Roderick Benson NP for evaluation of snoring in the setting of hypertension    History of Present Illness: 68-year-old female with history of hypertension describes long history of disruptive snoring.  She no one wants to share room [place] due to her loud snoring.  She has recently retired and has developed some insomnia.  Prior to retiring she go to bed around 10 PM and get up around 6 to 6:30 in the morning with an alarm.  She will often take a nap after work around 5 PM.  Currently she will go to bed around 10 but it takes her hours to fall asleep.  Once she finally falls asleep she typically will get up maybe once a night to go to the bathroom.  She has been sleeping in until sometimes as late as noon.  She will use an alarm if she needs to get up for an appointment.  She wants to be able to get up at 8:00  AM routinely.  During the day she drinks decaf coffee and is not taking naps.  At night she denies waking up with shortness of breath, gasping or choking.  She does wake up in the morning with a sense of jaw clenching.  She can have dry mouth in the morning as well.  She usually starts out sleeping on her right side and then go to her left and back.  She is not drinking alcohol before bed routinely or taking any sleep aids.    She denies restless legs, sleepwalking, sleep talking or dream enactment behavior.  She is having more vivid dreams than she had prior to retiring but did not clearly nightmares.    She has had weight gain in the last 20 years and difficulty losing weight.  She has some difficulty with balance which keeps her from getting as much exercise as she would  like.    [place] Sleepiness Scale   Sitting and reading: High chance of dozing   Watching TV: High chance of dozing   Sitting, inactive in a public place (e.g. a theatre or a meeting): Would never doze   As a passenger in a car for an hour without a break: Would never doze   Lying down to rest in the afternoon when circumstances permit: High chance of dozing   Sitting and talking to someone: Would never doze   Sitting quietly after a lunch without alcohol: Slight chance of dozing   In a car, while stopped for a few minutes in traffic: Would never doze   Total score - [place]: 10   (Less than 10 normal)    Insomnia Severity Scale  BEN Total Score: 8  Total score categories:  0-7 = No clinically significant insomnia   8-14 = Subthreshold insomnia   15-21 = Clinical insomnia (moderate severity)  22-28 = Clinical insomnia (severe)    STOP-BANG  Loud Snore   1  Excessively Tired/Sleepy   1  Observed apnea   0  Hypertension   1  BMI> 35 kg/m2   0  Age >50   1  Neck >16 in/40cm   0  Male Gender   0  Total =   4  (0-2 low, 3-4 intermediate, 5-8 high risk of TOM)      Past Medical History:   Diagnosis Date     Acoustic neuroma (H)      Allergic rhinitis Jan 1980     Dry throat      Gastro-oesophageal reflux disease      Hearing loss      Heartburn      Hypertension      Itchy eyes      Night sweats      Ringing in ears      Sleep apnea Jan 2010     Sneezing      Snoring      Tinnitus      Visual loss      Weight gain        Allergies   Allergen Reactions     Dust Mites      Grass      Mold      Ragweeds Other (See Comments)     Crustiness in nose, headaches     Trees        Current Outpatient Medications   Medication     amLODIPine (NORVASC) 5 MG tablet     CALCIUM PO     chlorpheniramine-pseudoePHEDrine (PSEUDO-GEST PLUS) 4-60 MG TABS     Glucosamine-Chondroitin (GLUCOSAMINE CHONDR COMPLEX PO)     lisinopril (ZESTRIL) 20 MG tablet     OMEPRAZOLE PO     rosuvastatin (CRESTOR) 10 MG tablet     No current facility-administered  medications for this visit.       Social History     Socioeconomic History     Marital status:      Spouse name: Not on file     Number of children: Not on file     Years of education: Not on file     Highest education level: Not on file   Occupational History     Not on file   Tobacco Use     Smoking status: Every Day     Packs/day: 0.50     Years: 25.00     Additional pack years: 0.00     Total pack years: 12.50     Types: Cigarettes     Smokeless tobacco: Never   Vaping Use     Vaping Use: Never used   Substance and Sexual Activity     Alcohol use: Yes     Comment: occasional use- once every other week- 1-3 drinks (social)     Drug use: No     Sexual activity: Not Currently   Other Topics Concern     Parent/sibling w/ CABG, MI or angioplasty before 65F 55M? Not Asked   Social History Narrative     Not on file     Social Determinants of Health     Financial Resource Strain: Low Risk  (1/3/2024)    Financial Resource Strain      Within the past 12 months, have you or your family members you live with been unable to get utilities (heat, electricity) when it was really needed?: No   Food Insecurity: Low Risk  (1/3/2024)    Food Insecurity      Within the past 12 months, did you worry that your food would run out before you got money to buy more?: No      Within the past 12 months, did the food you bought just not last and you didn t have money to get more?: No   Transportation Needs: Low Risk  (1/3/2024)    Transportation Needs      Within the past 12 months, has lack of transportation kept you from medical appointments, getting your medicines, non-medical meetings or appointments, work, or from getting things that you need?: No   Physical Activity: Not on file   Stress: Not on file   Social Connections: Not on file   Interpersonal Safety: Low Risk  (1/3/2024)    Interpersonal Safety      Do you feel physically and emotionally safe where you currently live?: Yes      Within the past 12 months, have you been  "hit, slapped, kicked or otherwise physically hurt by someone?: No      Within the past 12 months, have you been humiliated or emotionally abused in other ways by your partner or ex-partner?: No   Housing Stability: Low Risk  (1/3/2024)    Housing Stability      Do you have housing? : Yes      Are you worried about losing your housing?: No       Family History   Problem Relation Age of Onset     Hypertension Mother      Cerebrovascular Disease Mother      Alzheimer Disease Mother      Depression Mother      Psychotic Disorder Mother      Dementia Mother 60     Snoring Father      Sleep Apnea Brother      Diabetes Maternal Grandmother      Diabetes Paternal Grandmother      Thyroid Disease Maternal Uncle          EXAM:  /78   Pulse 77   Resp 15   Ht 1.523 m (4' 11.96\")   Wt 79.2 kg (174 lb 8 oz)   LMP  (LMP Unknown)   SpO2 97%   BMI 34.12 kg/m    GENERAL: Alert and no distress  EYES: Eyes grossly normal to inspection.  No discharge or erythema, or obvious scleral/conjunctival abnormalities.  Oropharynx Mallampati 3, large sulema  Neck is supple without lymphadenopathy  RESP: Lungs are clear to auscultation bilaterally  Cardiac tones regular rate and rhythm S1 is 2 normal  Extremities are perfused without edema  SKIN: Visible skin clear. No significant rash, abnormal pigmentation or lesions.  NEURO: Cranial nerves grossly intact.  Mentation and speech appropriate for age.  PSYCH: Appropriate affect, tone, and pace of words         TSH   Date Value Ref Range Status   01/03/2024 2.86 0.30 - 4.20 uIU/mL Final           ASSESSMENT:  68-year-old female with probable circadian rhythm delay that now contributing to insomnia given that she is no longer on a restricted work schedule.  She also has snoring and risk factors for sleep apnea.  Identifying and treating sleep apnea be medically indicated.    PLAN:  We reviewed the pathophysiology of obstructive sleep apnea, testing options, indications for treatment and " treatment options.  Recommended home sleep apnea testing.  Patient is interested in oral appliance therapy if that is an option.  Discussed the importance of allowing herself to stay up later.  She could try bedtimes of midnight to 1 AM.  See instructions for further details of counseling provided.      50 minutes spent by me on the date of the encounter doing chart review, history and exam, documentation and further activities per the note    Gia Ortiz M.D.  Pulmonary/Critical Care/Sleep Medicine    [facility]   Floor 1, [address]   [address] [place] [address]. [place] [address]   Appointments: [phone]    The above note was dictated using voice recognition software and may include typographical errors. Please contact the author for any clarifications.              Again, thank you for allowing me to participate in the care of your patient.        Sincerely,        Gia Ortiz MD

## 2024-02-07 ASSESSMENT — SLEEP AND FATIGUE QUESTIONNAIRES
HOW LIKELY ARE YOU TO NOD OFF OR FALL ASLEEP WHILE SITTING AND TALKING TO SOMEONE: WOULD NEVER DOZE
HOW LIKELY ARE YOU TO NOD OFF OR FALL ASLEEP WHILE WATCHING TV: SLIGHT CHANCE OF DOZING
HOW LIKELY ARE YOU TO NOD OFF OR FALL ASLEEP WHILE SITTING AND READING: MODERATE CHANCE OF DOZING
HOW LIKELY ARE YOU TO NOD OFF OR FALL ASLEEP WHILE SITTING QUIETLY AFTER LUNCH WITHOUT ALCOHOL: SLIGHT CHANCE OF DOZING
HOW LIKELY ARE YOU TO NOD OFF OR FALL ASLEEP WHILE LYING DOWN TO REST IN THE AFTERNOON WHEN CIRCUMSTANCES PERMIT: HIGH CHANCE OF DOZING
HOW LIKELY ARE YOU TO NOD OFF OR FALL ASLEEP WHEN YOU ARE A PASSENGER IN A CAR FOR AN HOUR WITHOUT A BREAK: SLIGHT CHANCE OF DOZING
HOW LIKELY ARE YOU TO NOD OFF OR FALL ASLEEP IN A CAR, WHILE STOPPED FOR A FEW MINUTES IN TRAFFIC: WOULD NEVER DOZE
HOW LIKELY ARE YOU TO NOD OFF OR FALL ASLEEP WHILE SITTING INACTIVE IN A PUBLIC PLACE: WOULD NEVER DOZE

## 2024-02-13 ENCOUNTER — VIRTUAL VISIT (OUTPATIENT)
Dept: PHARMACY | Facility: CLINIC | Age: 69
End: 2024-02-13
Payer: COMMERCIAL

## 2024-02-13 ENCOUNTER — TELEPHONE (OUTPATIENT)
Dept: SLEEP MEDICINE | Facility: CLINIC | Age: 69
End: 2024-02-13
Payer: COMMERCIAL

## 2024-02-13 DIAGNOSIS — E66.811 OBESITY (BMI 30.0-34.9): ICD-10-CM

## 2024-02-13 DIAGNOSIS — R73.03 PREDIABETES: Primary | ICD-10-CM

## 2024-02-13 DIAGNOSIS — I10 BENIGN ESSENTIAL HYPERTENSION: ICD-10-CM

## 2024-02-13 DIAGNOSIS — E78.5 HYPERLIPIDEMIA LDL GOAL <100: ICD-10-CM

## 2024-02-13 PROCEDURE — 99607 MTMS BY PHARM ADDL 15 MIN: CPT | Mod: 93 | Performed by: PHARMACIST

## 2024-02-13 PROCEDURE — 99605 MTMS BY PHARM NP 15 MIN: CPT | Mod: 93 | Performed by: PHARMACIST

## 2024-02-13 NOTE — PROGRESS NOTES
Medication Therapy Management (MTM) Encounter    ASSESSMENT:                            Medication Adherence/Access: No issues identified      Prediabetes/Weight (BMI 30.0-35.0): Patient could use medication assistance in reaching weight loss goals. Already has good current lifestyle practices between exercise and daily diet with weight remaining in 170's for about 15 years. A GLP-1 is likely more beneficial than other oral medication options (Contrave/topiramate) given the desired amount of weight loss, food cravings not currently bothersome, and prediabetes diagnosis. Per insurance information, appears Mounjaro is not covered, next prefer Ozempic considering potency. Unknown if insurance covers GLP-1 for diagnosis of prediabetes.  Medicare does not cover medications for weight loss indication (will not cover Wegovy or Zepbound).     Hypertension: Patient is meeting goal of <140/90 per reported home readings. If additional blood pressure support is needed in future, amlodipine is at max dose, lisinopril could still be increased. - Will evaluate efficacy at next appointment. Discussed effect of smoking on blood pressure.    Hyperlipidemia:   Stable.     Sleep: Sleep study tomorrow - no recommended changes today.    PLAN:                              Pending prior authorization approval: Start Ozempic; Inject 0.25 mg subcutaneously once weekly for 4 weeks, then increase to 0.5 mg once weekly.     Continue taking amlodipine 10 mg daily and Lisinopril 20 mg daily with daily blood pressure monitoring. Have blood pressure log accessible at next appointment with PCP.    Follow-up: Aprilt when medication is started, will schedule follow-up appointment 6 weeks after medication start    SUBJECTIVE/OBJECTIVE:                          Herminia Pastor is a 68 year old female called for an initial visit. She was referred to me from Roderick Benson CNP.      Reason for visit: Patient is interested in weight loss drugs.  Personal  Healthcare Goals: Lose 40-50 lbs      Allergies/ADRs: Reviewed in chart  Past Medical History: Reviewed in chart  Tobacco: She reports that she has been smoking cigarettes. She has a 12.5 pack-year smoking history. She has never used smokeless tobacco.Nicotine/Tobacco Cessation Plan  Information offered: Patient not interested at this time    Alcohol: 1-3 beverages / week  Other Substance Use: none  Caffeine: Decaf. Thermos per day.  Social: Retired (September 2023)  Exercise 20-30 minutes a day on treadmill. Does steps.  Has about 40k in a health [employer] plan account.    Medication Adherence/Access: no issues reported        Prediabetes/Weight (BMI 30.0-35.0)  No current medications for blood glucose control    Had taken metformin in the past. Patient was having diarrhea even on one tablet of metformin.     Has been ~170 lbs for the past 15 years; was ~125 lbs until Menopause in patient's 50s. Patient feels confident then could maintain weight loss if they were able to lose it initially. Minimal snacking some cravings for sweets, doesn't typically indulge.     Has not tried any medications specifically for weight loss.  Had bupropion in past for smoking cessation and tolerated it well. Did not find it effective for weight loss.    Daily Eating Summary:  Morning - Oatmeal  Lunch - Fruit + crackers  Dinner - Chicken, sandwich, lean cuisine  Snacks - Chocolate, has 1 sweet a day  Doesn't eat after 6 PM    Lab Results   Component Value Date    A1C 6.2 01/03/2024       Hypertension   Amlodipine 5 mg tab, 2 tabs at once daily (increased dose in January)  Lisinopril 20 mg tab, daily    Has been checking blood pressure at home (patient feels home machine registers a little lower than in clinic).   Patient states their blood pressure was lower at start of year (all under 123/62).  In January their home measurements had started going up to 140s.   They increased their amlodipine dose and this month they are seeing a decrease  in BP home readings (now 120s-130s/70s).    Patient experiences dizziness/head throbbing a few times per week. Patient states they feel this is more situational than blood pressure related     Patient reports no current medication side effects. No swelling/hypotension following amlodipine increase.     BP Readings from Last 3 Encounters:   01/19/24 134/78   01/03/24 (!) 162/74   10/10/22 120/78     Pulse Readings from Last 3 Encounters:   01/19/24 77   01/03/24 72   10/10/22 68     Hyperlipidemia:   rosuvastatin 10mg daily  Patient reports no current medication side effects.  (Last lipid panel 6/21/23, LDL=47, HDL=49, UB=503)    Sleep:   No medications  Scheduled to do sleep study tomorrow.   Feels major problem is not getting quality sleep (not feeling rested despite good number of hours asleep).   Has some trouble falling asleep.   Feels alert during day.  Normally gets to bed between 10-11 pm; gets about 10 hours per night.    Today's Vitals: LMP  (LMP Unknown)   ----------------    I spent 40 minutes with this patient today. All changes were made via collaborative practice agreement with Roderick Benson NP. A copy of the visit note was provided to the patient's provider(s).    A summary of these recommendations was sent via [employer].    Kathe Moody, PharmD, BCACP   Medication Therapy Management Pharmacist   [facility]  [phone]     Wily Romero  Pharm-D4 student      Telemedicine Visit Details  Type of service:  Telephone visit  Start Time:  9 AM  End Time:  9:40 AM     Medication Therapy Recommendations  Obesity (BMI 30.0-34.9)    Rationale: Untreated condition - Needs additional medication therapy - Indication   Recommendation: Start Medication - Ozempic (0.25 or 0.5 MG/DOSE) 2 MG/1.5ML Sopn   Status: Accepted per CPA

## 2024-02-13 NOTE — TELEPHONE ENCOUNTER
Spoke with patient. Let her know devices need to be returned morning after testing. She will drop off at 8 am the next morning.     Lnida RAMOS RN  [facility]

## 2024-02-13 NOTE — TELEPHONE ENCOUNTER
Reason for Call:  Other call back    Detailed comments: patient called and has a [facility]     Drip off Thursday, but wants to know if can do Drop off on Friday instead 10 AM.    Or Thursday Drop off at 4:00 PM?    Please contact patient.  Thank you.    Phone Number Patient can be reached at: Cell number on file:    Telephone Information:   Mobile [phone]       Best Time: any    Can we leave a detailed message on this number? YES    Call taken on 2/13/2024 at 9:56 AM by Veronica Bess

## 2024-02-13 NOTE — LETTER
February 14, 2024  Herminia Pastor  [address] [place] [address]   [facility] [address]    Dear Ms. Pastor, [facility]     Thank you for talking with me on Feb 13, 2024 about your health and medications. As a follow-up to our conversation, I have included two documents:      Your Recommended To-Do List has steps you should take to get the best results from your medications.  Your Medication List will help you keep track of your medications and how to take them.    If you want to talk about these documents, please call Kathe Moody RPH at phone: [phone], Monday-Friday 8-4:30pm.    I look forward to working with you and your doctors to make sure your medications work well for you.    Sincerely,  Kathe Moody RPH  [facility] Pharmacist, [facility]

## 2024-02-13 NOTE — LETTER
_  Medication List        Prepared on: 02/14/2024     Bring your Medication List when you go to the doctor, hospital, or   emergency room. And, share it with your family or caregivers.     Note any changes to how you take your medications.  Cross out medications when you no longer use them.    Medication How I take it Why I use it Prescriber   amLODIPine (NORVASC) 5 MG tablet Take 2 tablets (10 mg) by mouth daily Benign Essential Hypertension Roderick Benson NP   CALCIUM PO Take 300 mg by mouth daily  General Health  Patient Reported   chlorpheniramine-pseudoePHEDrine (PSEUDO-GEST PLUS) 4-60 MG TABS Take 1 tablet by mouth every 6 hours as needed for allergies  allergies Patient Reported   Glucosamine-Chondroitin (GLUCOSAMINE CHONDR COMPLEX PO) Take 2,000 mg by mouth daily  General Health  Patient Reported   lisinopril (ZESTRIL) 20 MG tablet Take 20 mg by mouth daily  Benign Essential Hypertension Roderick Benson NP   omeprazole (PRILOSEC) 20 MG DR capsule Take 20 mg by mouth every morning  heartburn Patient Reported   rosuvastatin (CRESTOR) 10 MG tablet Take 10 mg by mouth daily  High cholesterol Roderick Benson NP   semaglutide (OZEMPIC) 2 MG/3ML pen Inject 0.25 mg Subcutaneous every 7 days for 28 days, THEN 0.5 mg every 7 days for 99 days. Prediabetes; Obesity (BMI 30.0-34.9) Roderick Benson NP         Add new medications, over-the-counter drugs, herbals, vitamins, or  minerals in the blank rows below.    Medication How I take it Why I use it Prescriber                                      Allergies:      dust mites; grass; mold; ragweeds; trees        Side effects I have had:               Other Information:              My notes and questions:

## 2024-02-13 NOTE — LETTER
"Recommended To-Do List      Prepared on: 02/14/2024       You can get the best results from your medications by completing the items on this \"To-Do List.\"      Bring your To-Do List when you go to your doctor. And, share it with your family or caregivers.    My To-Do List:  What we talked about: What I should do:   A new medication that may be of benefit to you    Start taking Ozempic (0.25 or 0.5 MG/DOSE)          What we talked about: What I should do:                     "

## 2024-02-14 ENCOUNTER — TELEPHONE (OUTPATIENT)
Dept: FAMILY MEDICINE | Facility: CLINIC | Age: 69
End: 2024-02-14

## 2024-02-14 ENCOUNTER — OFFICE VISIT (OUTPATIENT)
Dept: SLEEP MEDICINE | Facility: CLINIC | Age: 69
End: 2024-02-14
Payer: COMMERCIAL

## 2024-02-14 DIAGNOSIS — R06.83 SNORING: ICD-10-CM

## 2024-02-14 DIAGNOSIS — G47.21 CIRCADIAN RHYTHM SLEEP DISORDER, DELAYED SLEEP PHASE TYPE: ICD-10-CM

## 2024-02-14 DIAGNOSIS — G47.19 EXCESSIVE DAYTIME SLEEPINESS: ICD-10-CM

## 2024-02-14 DIAGNOSIS — G47.09 OTHER INSOMNIA: ICD-10-CM

## 2024-02-14 PROCEDURE — G0399 HOME SLEEP TEST/TYPE 3 PORTA: HCPCS | Performed by: INTERNAL MEDICINE

## 2024-02-14 NOTE — PATIENT INSTRUCTIONS
"Recommendations from today's MTM visit:                                                    MTM (medication therapy management) is a service provided by a clinical pharmacist designed to help you get the most of out of your medicines.   Today we reviewed what your medicines are for, how to know if they are working, that your medicines are safe and how to make your medicine regimen as easy as possible.        Pending prior authorization approval: Start Ozempic; Inject 0.25 mg subcutaneously every 7 days for 4 weeks, then increase to 0.5 mg every 7 days.     Continue taking amlodipine 10 mg daily and Lisinopril 20 mg daily with daily blood pressure monitoring. Have blood pressure log accessible at next appointment with Roderick Benson for review.    Follow-up: please reach out on [employer] when you hear back about medication coverage. We will plan to follow-up about 6 weeks after starting a medication.     It was great speaking with you today.  I value your experience and would be very thankful for your time in providing feedback in our clinic survey. In the next few days, you may receive an email or text message from [employer] with a link to a survey related to your  clinical pharmacist.\"     To schedule another MTM appointment, please call the clinic directly or you may call the MTM scheduling line at [phone] or toll-free at [phone].     My Clinical Pharmacist's contact information:                                                      Please feel free to contact me with any questions or concerns you have.      Kathe Moody, PharmD, BCACP   Medication Therapy Management Pharmacist   [facility]  [phone]    "

## 2024-02-15 ENCOUNTER — DOCUMENTATION ONLY (OUTPATIENT)
Dept: SLEEP MEDICINE | Facility: CLINIC | Age: 69
End: 2024-02-15
Payer: COMMERCIAL

## 2024-02-15 NOTE — PROGRESS NOTES
This HSAT was performed using a Noxturnal T3 device which recorded snore, sound, movement activity, body position, nasal pressure, oronasal thermal airflow, pulse, oximetry and both chest and abdominal respiratory effort. HSAT data was restricted to the time patient states they were in bed.     HSAT was scored using 1B 4% hypopnea rule.     AHI: 25.9.  Snoring was reported as loud.  Time with SpO2 below 89% was 424.5 minutes.   Overall signal quality was good     Pt will follow up with sleep provider to determine appropriate therapy.     Ordering Provider, Gia Ortiz MD C. Oyugi, BA, RPSGT, RST System Clinical Specialist/ 2/15/2024

## 2024-02-15 NOTE — PROGRESS NOTES
HST POST-STUDY QUESTIONNAIRE    What time did you go to bed?  10:30  How long do you think it took to fall asleep?  2 hrs  What time did you wake up to start the day?  6:45  Did you get up during the night at all?  no  If you woke up, do you remember approximately what time(s)? 2:00, 5:02  Did you have any difficulty with the equipment?  No  Did you us any type of treatment with this study?  None  Was the head of the bed elevated? No  Did you sleep in a recliner?  No  Did you stop using CPAP at least 3 days before this test?  NA  Any other information you'd like us to know? no

## 2024-02-20 NOTE — PROCEDURES
"HOME SLEEP STUDY INTERPRETATION        Patient: Herminia Pastor  MRN: [id number]  YOB: 1955  Study Date: 2024  PCP/Referring Provider: No Ref-Primary, Physician;   Ordering Provider:   Gia Ortiz MD         Indications for Home Study: Herminia Pastor is a 68 year old female with a history of hypertension who presents with symptoms suggestive of obstructive sleep apnea.    Estimated body mass index is 34.12 kg/m  as calculated from the following:    Height as of 24: 1.523 m (4' 11.96\").    Weight as of 24: 79.2 kg (174 lb 8 oz).  Total score - [place]: 8 (2024  9:49 AM)  STOP-BAN/8        Data: A full night home sleep study was performed recording the standard physiologic parameters including body position, movement, sound, nasal pressure, thermal oral airflow, chest and abdominal movements with respiratory inductance plethysmography, and oxygen saturation by pulse oximetry. Pulse rate was estimated by oximetry recording. This study was considered adequate based on > 4 hours of quality oximetry and respiratory recording. As specified by the AASM Manual for the Scoring of Sleep and Associated events, version 2.3, Rule VIII.D 1B, 4% oxygen desaturation scoring for hypopneas is used as a standard of care on all home sleep apnea testing.        Analysis Time:  490 minutes        Respiration:   Sleep Associated Hypoxemia: sustained hypoxemia was present. Baseline oxygen saturation was 91%.  Time with saturation less than or equal to 88% was 380 minutes. The lowest oxygen saturation was 72%.   Snoring: Snoring was present (30% time at average 71 dB.)  Respiratory events: The home study revealed a presence of 31 obstructive apneas and 11 mixed and central apneas. There were 170 hypopneas resulting in a combined apnea/hypopnea index [AHI] of 25.9 events per hour.  AHI was 53.3 per hour supine, NA per hour prone, 14.4 per hour on left side, and 19.1 per hour on right side.   Pattern: " Excluding events noted above, respiratory rate and pattern was Normal.      Position: Percent of time spent: supine - 24%, prone - 0%, on left - 32%, on right - 43%.      Heart Rate: By pulse oximetry normal rate was noted.       Assessment:   Moderate obstructive sleep apnea with AHI 25.9 events per hour.  Sleep associated hypoxemia was present.    Recommendations:  Consider auto-CPAP at 5-15 cmH2O, oral appliance therapy with positional therapy (followed by repeat HST or KINGSLEY to ensure hypoxemia resolved.)  Suggest optimizing sleep hygiene and avoiding sleep deprivation.  Weight management.        Diagnosis Code(s): Obstructive Sleep Apnea G47.33, Hypoxemia G47.36, Snoring R06.83    Electronically signed by: Gia Ortiz MD, February 20, 2024   Diplomate, American Board of Internal Medicine, Sleep Medicine

## 2024-02-22 PROBLEM — G47.33 OSA (OBSTRUCTIVE SLEEP APNEA): Status: ACTIVE | Noted: 2024-02-22

## 2024-02-27 NOTE — TELEPHONE ENCOUNTER
Will discuss alternative options with patient via MyChart.   Kathe Moody, PharmD, [facility]   Medication Therapy Management Pharmacist   [facility]  [phone]

## 2024-02-27 NOTE — TELEPHONE ENCOUNTER
PRIOR AUTHORIZATION DENIED    Medication: OZEMPIC (0.25 OR 0.5 MG/DOSE) 2 MG/1.5ML SC SOPN  Insurance Company: [employer] - Phone [phone] Fax [phone]  Denial Date: 2/27/2024  Denial Reason(s):     Drug is covered for the indicated diagnosis.            Appeal Information: Not available/provided      Patient Notified: No

## 2024-03-20 ENCOUNTER — MYC REFILL (OUTPATIENT)
Dept: FAMILY MEDICINE | Facility: CLINIC | Age: 69
End: 2024-03-20
Payer: COMMERCIAL

## 2024-03-20 DIAGNOSIS — I10 BENIGN ESSENTIAL HYPERTENSION: ICD-10-CM

## 2024-03-21 ENCOUNTER — MYC MEDICAL ADVICE (OUTPATIENT)
Dept: FAMILY MEDICINE | Facility: CLINIC | Age: 69
End: 2024-03-21
Payer: COMMERCIAL

## 2024-03-21 DIAGNOSIS — I10 BENIGN ESSENTIAL HYPERTENSION: ICD-10-CM

## 2024-03-21 RX ORDER — AMLODIPINE BESYLATE 5 MG/1
10 TABLET ORAL DAILY
Qty: 60 TABLET | Refills: 3 | OUTPATIENT
Start: 2024-03-21

## 2024-03-25 RX ORDER — AMLODIPINE BESYLATE 10 MG/1
10 TABLET ORAL DAILY
Qty: 90 TABLET | Refills: 1 | Status: SHIPPED | OUTPATIENT
Start: 2024-03-25

## 2024-03-25 NOTE — TELEPHONE ENCOUNTER
Roderick    See pt Mychart message  Cued up 10mg dose if you agree    Brittany Howard RN   [facility]

## 2024-04-03 ENCOUNTER — MYC MEDICAL ADVICE (OUTPATIENT)
Dept: SLEEP MEDICINE | Facility: CLINIC | Age: 69
End: 2024-04-03
Payer: COMMERCIAL

## 2024-04-03 DIAGNOSIS — G47.33 OSA (OBSTRUCTIVE SLEEP APNEA): Primary | ICD-10-CM

## 2024-04-04 NOTE — TELEPHONE ENCOUNTER
Patient found he cannot tolerate OTC oral appliance, requesting order for CPAP therapy.  Order pended for provider consideration.

## 2024-04-08 NOTE — TELEPHONE ENCOUNTER
CPAP order placed with follow-up oximetry on therapy given prolonged hypoxemia on home sleep testing.

## 2024-04-16 ENCOUNTER — DOCUMENTATION ONLY (OUTPATIENT)
Dept: SLEEP MEDICINE | Facility: CLINIC | Age: 69
End: 2024-04-16
Payer: COMMERCIAL

## 2024-04-16 NOTE — PROGRESS NOTES
AFTER GOING THROUGH THE SETUP AND TRYING MASK ON, PT SAW THE SALES ORDER AND DIDN'T REALIZE SHE MAY BE RESPONSIBLE FOR A PORTION OF THE BILL.  SHE SAID SHE NEEDED TO CALL INSUR AND SEE WHAT HER PORTION MAY BE.  ALL EQUIPMENT IS AT MY DESK AWAITING HER DECISION.

## 2024-04-23 ENCOUNTER — DOCUMENTATION ONLY (OUTPATIENT)
Dept: SLEEP MEDICINE | Facility: CLINIC | Age: 69
End: 2024-04-23
Payer: COMMERCIAL

## 2024-04-23 NOTE — PROGRESS NOTES
4/23/24- SUSI RUIZ TOLD HER OOP PRICE FOR DEVICE AND ALL SUPPLIES.  SHE STATES SHE WATS TO USE DEVICE WHEN SHE WANTS AN DOES NOT WANT TO BE TOLD WHEN TO USE IT.  SHE WILL LOOK ON LINE FOR CHEAPER OPTIONS.

## 2024-04-29 ASSESSMENT — SLEEP AND FATIGUE QUESTIONNAIRES
HOW LIKELY ARE YOU TO NOD OFF OR FALL ASLEEP IN A CAR, WHILE STOPPED FOR A FEW MINUTES IN TRAFFIC: WOULD NEVER DOZE
HOW LIKELY ARE YOU TO NOD OFF OR FALL ASLEEP WHILE SITTING INACTIVE IN A PUBLIC PLACE: WOULD NEVER DOZE
HOW LIKELY ARE YOU TO NOD OFF OR FALL ASLEEP WHILE SITTING AND READING: WOULD NEVER DOZE
HOW LIKELY ARE YOU TO NOD OFF OR FALL ASLEEP WHILE SITTING AND TALKING TO SOMEONE: WOULD NEVER DOZE
HOW LIKELY ARE YOU TO NOD OFF OR FALL ASLEEP WHILE WATCHING TV: SLIGHT CHANCE OF DOZING
HOW LIKELY ARE YOU TO NOD OFF OR FALL ASLEEP WHILE LYING DOWN TO REST IN THE AFTERNOON WHEN CIRCUMSTANCES PERMIT: MODERATE CHANCE OF DOZING
HOW LIKELY ARE YOU TO NOD OFF OR FALL ASLEEP WHILE SITTING QUIETLY AFTER LUNCH WITHOUT ALCOHOL: WOULD NEVER DOZE
HOW LIKELY ARE YOU TO NOD OFF OR FALL ASLEEP WHEN YOU ARE A PASSENGER IN A CAR FOR AN HOUR WITHOUT A BREAK: WOULD NEVER DOZE

## 2024-04-30 ENCOUNTER — ANCILLARY PROCEDURE (OUTPATIENT)
Dept: MAMMOGRAPHY | Facility: CLINIC | Age: 69
End: 2024-04-30
Payer: COMMERCIAL

## 2024-04-30 DIAGNOSIS — Z12.31 ENCOUNTER FOR SCREENING MAMMOGRAM FOR BREAST CANCER: ICD-10-CM

## 2024-04-30 PROCEDURE — 77067 SCR MAMMO BI INCL CAD: CPT | Mod: TC | Performed by: STUDENT IN AN ORGANIZED HEALTH CARE EDUCATION/TRAINING PROGRAM

## 2024-05-03 ENCOUNTER — OFFICE VISIT (OUTPATIENT)
Dept: SLEEP MEDICINE | Facility: CLINIC | Age: 69
End: 2024-05-03
Payer: COMMERCIAL

## 2024-05-03 VITALS
HEIGHT: 60 IN | SYSTOLIC BLOOD PRESSURE: 93 MMHG | HEART RATE: 78 BPM | BODY MASS INDEX: 32.39 KG/M2 | DIASTOLIC BLOOD PRESSURE: 58 MMHG | WEIGHT: 165 LBS | OXYGEN SATURATION: 98 %

## 2024-05-03 DIAGNOSIS — G47.34 NOCTURNAL HYPOXEMIA: ICD-10-CM

## 2024-05-03 DIAGNOSIS — J43.2 CENTRILOBULAR EMPHYSEMA (H): ICD-10-CM

## 2024-05-03 DIAGNOSIS — G47.33 OSA (OBSTRUCTIVE SLEEP APNEA): Primary | ICD-10-CM

## 2024-05-03 PROCEDURE — 99214 OFFICE O/P EST MOD 30 MIN: CPT | Performed by: INTERNAL MEDICINE

## 2024-05-03 NOTE — PROGRESS NOTES
Chief complaint: Follow-up home sleep apnea testing    History of Present Illness: 68-year-old female with history of hypertension, long history of disruptive snoring, recent development of insomnia after retiring.  She has been trying to go to bed a little bit later but she is bored.  She spends a lot of time in bed.  Since her last visit she underwent home sleep apnea testing.  I reviewed the results with her via [employer].  She wanted to pursue an oral appliance.  She has attempted to have a consultation with a provider at the [facility] however she was never able to get in for an appointment.  She ended up purchasing an online device.  She felt like it was not ineffective.  She reached out and we put in orders for CPAP.  She went to go get the CPAP.  She found that the pressure felt too high and that she was swallowing air and bloating.  That pressure test was done sitting up at the lowest pressure.  She did find a comfortable mask.    She continues to smoke about half pack a day.  She has never been told about low oxygen levels before.  The sleep test showed moderate obstructive sleep apnea, severe in the supine position with persistent hypoxemia the whole night.  The hypoxemia was independent of the obstructive sleep apnea.    She never had pulmonary function testing completed.    [place] Sleepiness Scale  Total score - [place]: 3 (4/29/2024  8:31 AM)   (Less than 10 normal)    Insomnia Severity Scale  BEN Total Score: 8  (normal 0-7, mild 8-14, moderate 15-21, severe 22-28)    Past Medical History:   Diagnosis Date    Acoustic neuroma (H)     Allergic rhinitis 01/1980    Dry throat     Gastro-oesophageal reflux disease     Hearing loss     Heartburn     Hypertension     Itchy eyes     Night sweats     Ringing in ears     Sneezing     Snoring     Tinnitus     Visual loss     Weight gain        Allergies   Allergen Reactions    Dust Mites     Grass     Mold     Ragweeds Other (See Comments)     Crustiness in  nose, headaches    Trees        Current Outpatient Medications   Medication Sig Dispense Refill    amLODIPine (NORVASC) 10 MG tablet Take 1 tablet (10 mg) by mouth daily 90 tablet 1    amLODIPine (NORVASC) 5 MG tablet Take 2 tablets (10 mg) by mouth daily 60 tablet 3    CALCIUM PO Take 300 mg by mouth daily      chlorpheniramine-pseudoePHEDrine (PSEUDO-GEST PLUS) 4-60 MG TABS Take 1 tablet by mouth every 6 hours as needed for allergies      COMPOUNDED NON-CONTROLLED SUBSTANCE (CMPD RX) - PHARMACY TO MIX COMPOUNDED MEDICATION Semaglutide; inject 0.5mg subcutaneously every 7 days 4 each 1    Glucosamine-Chondroitin (GLUCOSAMINE CHONDR COMPLEX PO) Take 2,000 mg by mouth daily      lisinopril (ZESTRIL) 20 MG tablet Take 20 mg by mouth daily      omeprazole (PRILOSEC) 20 MG DR capsule Take 20 mg by mouth every morning      rosuvastatin (CRESTOR) 10 MG tablet Take 10 mg by mouth daily       No current facility-administered medications for this visit.       Social History     Socioeconomic History    Marital status:      Spouse name: Not on file    Number of children: Not on file    Years of education: Not on file    Highest education level: Not on file   Occupational History    Not on file   Tobacco Use    Smoking status: Every Day     Current packs/day: 0.50     Average packs/day: 0.5 packs/day for 25.0 years (12.5 ttl pk-yrs)     Types: Cigarettes    Smokeless tobacco: Never   Vaping Use    Vaping status: Never Used   Substance and Sexual Activity    Alcohol use: Yes     Comment: occasional use- once every other week- 1-3 drinks (social)    Drug use: No    Sexual activity: Not Currently   Other Topics Concern    Parent/sibling w/ CABG, MI or angioplasty before 65F 55M? Not Asked   Social History Narrative    Not on file     Social Determinants of Health     Financial Resource Strain: Low Risk  (1/3/2024)    Financial Resource Strain     Within the past 12 months, have you or your family members you live with  "been unable to get utilities (heat, electricity) when it was really needed?: No   Food Insecurity: Low Risk  (1/3/2024)    Food Insecurity     Within the past 12 months, did you worry that your food would run out before you got money to buy more?: No     Within the past 12 months, did the food you bought just not last and you didn t have money to get more?: No   Transportation Needs: Low Risk  (1/3/2024)    Transportation Needs     Within the past 12 months, has lack of transportation kept you from medical appointments, getting your medicines, non-medical meetings or appointments, work, or from getting things that you need?: No   Physical Activity: Not on file   Stress: Not on file   Social Connections: Not on file   Interpersonal Safety: Low Risk  (1/3/2024)    Interpersonal Safety     Do you feel physically and emotionally safe where you currently live?: Yes     Within the past 12 months, have you been hit, slapped, kicked or otherwise physically hurt by someone?: No     Within the past 12 months, have you been humiliated or emotionally abused in other ways by your partner or ex-partner?: No   Housing Stability: Low Risk  (1/3/2024)    Housing Stability     Do you have housing? : Yes     Are you worried about losing your housing?: No       Family History   Problem Relation Age of Onset    Hypertension Mother     Cerebrovascular Disease Mother     Alzheimer Disease Mother     Depression Mother     Psychotic Disorder Mother     Dementia Mother 60    Snoring Father     Sleep Apnea Brother     Diabetes Maternal Grandmother     Diabetes Paternal Grandmother     Thyroid Disease Maternal Uncle        CT scan for lung cancer screening January 16, 2024 did show some centrilobular emphysema.    EXAM:  BP 93/58   Pulse 78   Ht 1.523 m (4' 11.96\")   Wt 74.8 kg (165 lb)   LMP  (LMP Unknown)   SpO2 98%   BMI 32.27 kg/m    GENERAL: Alert and no distress  EYES: Eyes grossly normal to inspection.  No discharge or erythema, " or obvious scleral/conjunctival abnormalities.  RESP: No audible wheeze, cough, or visible cyanosis.    SKIN: Visible skin clear. No significant rash, abnormal pigmentation or lesions.  NEURO: Cranial nerves grossly intact.  Mentation and speech appropriate for age.  PSYCH: Appropriate affect, tone, and pace of words       HSAT 2/14/2024  Weight 174 lbs BMI 34.12  AHI 25.9, supine AHI 53.3  Time with saturation less than or equal to 88% was 380 minutes. The lowest oxygen saturation was 72%.           TSH   Date Value Ref Range Status   01/03/2024 2.86 0.30 - 4.20 uIU/mL Final         ASSESSMENT:  68-year-old female with moderate obstructive sleep apnea and nocturnal hypoxemia likely multifactorial related at least in part to emphysema as well as obstructive sleep apnea.  She has some insomnia since retiring and spends excessive amount of time in bed.  She is likely has some mildly delayed circadian rhythm delay.  She is working on weight loss and is lost 10 pounds with medication therapy.    PLAN:  Recommended comprehensive pulmonary function testing and 6-minute walk testing to assess for exertional hypoxemia.  Also encouraged her to proceed with CPAP set up.  Discussed how it she may tolerate this better when lying down and sleepy.  We also discussed the option of going to the sleep lab and doing a titration study.  Patient is interested in proceeding with the auto titrating route first.  Will proceed with overnight oximetry to be done when she is meeting compliance goals.  If she does have persistent hypoxemia despite normalization of AHI would then likely need an in lab PSG.  Patient strongly encouraged to stay up later and only go to sleep when sleepy.  Also encouraged to quit smoking altogether.  Continue efforts at weight management.  It is possible that we could reassess underlying sleep apnea when she has achieved a significant sustained weight loss.      30 minutes spent by me on the date of the encounter  doing chart review, history and exam, documentation and further activities per the note    Gia Ortiz M.D.  Pulmonary/Critical Care/Sleep Medicine    [facility]   Floor 1, [address]   [address] [place] [address]   Appointments: [phone]    The above note was dictated using voice recognition software and may include typographical errors. Please contact the author for any clarifications.

## 2024-05-03 NOTE — PATIENT INSTRUCTIONS
"If you are experiencing some anxiety about trying a PAP mask or breathing with pressurized air try the following steps in sequence to get used to PAP. This is called \"desensitization\".  1.  Wear mask (disconnected from the device) for 60 minutes daily awake and use a distraction: Sit and watch TV, read, or listen to music with the mask on. Take the mask off and put it back on, as needed. This can be in a chair in the living room, for example.  2.  When you can use the mask without taking it off for 60 minutes and without anxiety proceed to step 3.  3.  Attach the mask to the PAP device, and switch the unit  on  and practice breathing through the mask for one hour while watching television, reading or performing some other sedentary, distracting activity.   4.  Once you are comfortable wearing PAP for 30-60 minutes without anxiety while distracted with an activity, try to use it in bed. You can continue distraction in bed by watching TV, reading, listening to music or an audio book, etc.  The main goal is to  not think about using PAP  but pay attention to relaxing.  5. Use the PAP during scheduled one-hour naps at home.  6. Use PAP during initial 3-4 hours of nocturnal sleep.  7. Use PAP through an entire night of sleep.       Your BMI is Body mass index is 32.27 kg/m .    What is BMI?  Body mass index (BMI) is one way to tell whether you are at a healthy weight, overweight, or obese. It measures your weight in relation to your height.  A BMI of 18.5 to 24.9 is in the healthy range. A person with a BMI of 25 to 29.9 is considered overweight, and someone with a BMI of 30 or greater is considered obese.  Another way to find out if you are at risk for health problems caused by overweight and obesity is to measure your waist. If you are a woman and your waist is more than 35 inches, or if you are a man and your waist is more than 40 inches, your risk of disease may be higher.  More than two-thirds of American adults are " 1214 IV infusion completed. 20g IV removed from Right AC. Band aid placed at site. MD notified. Pt states she feels better.    considered overweight or obese. Being overweight or obese increases the risk for further weight gain.  Excess weight may lead to heart disease and diabetes. Creating and following plans for healthy eating and physical activity may help you improve your health.    Methods for maintaining or losing weight.  Weight control is part of healthy lifestyle and includes exercise, emotional health, and healthy eating habits.  Careful eating habits lifelong is the mainstay of weight control.  Though there are significant health benefits from weight loss, long-term weight loss with diet alone may be very difficult to achieve- studies show long-term success with dietary management in less than 10% of people. Attaining a healthy weight may be especially difficult to achieve in those with severe obesity. In some cases, medications, devices and surgical management might be considered.    What can you do?  If you are overweight or obese and are interested in methods for weight loss, you should discuss this with your provider.

## 2024-05-03 NOTE — NURSING NOTE
Pt stated she will get cpap from [facility] and do her andrew testing orders sent to [facility].    Sb Fish, Visit facilitator.

## 2024-05-09 ENCOUNTER — DOCUMENTATION ONLY (OUTPATIENT)
Dept: SLEEP MEDICINE | Facility: CLINIC | Age: 69
End: 2024-05-09
Payer: COMMERCIAL

## 2024-05-09 DIAGNOSIS — G47.33 OSA (OBSTRUCTIVE SLEEP APNEA): Primary | ICD-10-CM

## 2024-05-09 NOTE — PROGRESS NOTES
Patient was offered choice of [facility]  Patient Herminia Pastor was set up at [place] on May 9, 2024. Patient received a Resmed Airsense 11 Pressures were set at  5-15 cm H2O.   Patient s ramp is 4 cm H2O for 20 min and FLEX/EPR is 3.  Patient received a Resmed Mask name: Airfit [id number]  Nasal mask size Small, heated tubing and heated humidifier.  Patient has the following compliance requirements: using and visit requirements  Patient has a follow up on 8/22/24 with Dr. Ortiz.    Ban Lockhart

## 2024-05-13 ENCOUNTER — DOCUMENTATION ONLY (OUTPATIENT)
Dept: SLEEP MEDICINE | Facility: CLINIC | Age: 69
End: 2024-05-13
Payer: COMMERCIAL

## 2024-05-14 NOTE — PROGRESS NOTES
3 day Sleep therapy management telephone visit    Diagnostic AHI: HST: 25.9        Confirmed with patient at time of call- N/A Patient is still interested in STM service       Message left for patient to return call.        Objective data     Order Settings for PAP  CPAP min     CPAP max              Device settings from machine CPAP min 5     CPAP max 15                      Assessment: No usage but has account in [employer]      Patient has the following upcoming sleep appts:  Future Sleep Appointments         Provider Department    8/22/2024 1:00 PM (Arrive by 12:45 PM) Gia Ortiz MD [facility] [place]            Replacement device: No  STM ordered by provider: Yes     Total time spent on accessing and  interpreting remote patient PAP therapy data  10 minutes    Total time spent counseling, coaching  and reviewing PAP therapy data with patient  1 minutes    [id number] no

## 2024-05-15 ENCOUNTER — OFFICE VISIT (OUTPATIENT)
Dept: PULMONOLOGY | Facility: CLINIC | Age: 69
End: 2024-05-15
Payer: COMMERCIAL

## 2024-05-15 ENCOUNTER — OFFICE VISIT (OUTPATIENT)
Dept: PULMONOLOGY | Facility: CLINIC | Age: 69
End: 2024-05-15
Attending: INTERNAL MEDICINE
Payer: COMMERCIAL

## 2024-05-15 DIAGNOSIS — J43.2 CENTRILOBULAR EMPHYSEMA (H): ICD-10-CM

## 2024-05-15 DIAGNOSIS — G47.34 NOCTURNAL HYPOXEMIA: ICD-10-CM

## 2024-05-15 DIAGNOSIS — G47.33 OSA (OBSTRUCTIVE SLEEP APNEA): ICD-10-CM

## 2024-05-15 LAB
6 MIN WALK (FT): 1275 FT
6 MIN WALK (M): 389 M

## 2024-05-15 PROCEDURE — 94729 DIFFUSING CAPACITY: CPT | Performed by: INTERNAL MEDICINE

## 2024-05-15 PROCEDURE — 94375 RESPIRATORY FLOW VOLUME LOOP: CPT | Performed by: INTERNAL MEDICINE

## 2024-05-15 PROCEDURE — 94618 PULMONARY STRESS TESTING: CPT | Performed by: INTERNAL MEDICINE

## 2024-05-15 PROCEDURE — 94726 PLETHYSMOGRAPHY LUNG VOLUMES: CPT | Performed by: INTERNAL MEDICINE

## 2024-05-15 NOTE — PROGRESS NOTES
Herminia Pastor comes into clinic today at the request of Dr Yadav , for PFT    Tolerated testing well. No adverse reactions. Left lab in no distress.        DIANA BERGER

## 2024-05-15 NOTE — PROGRESS NOTES
Herminia Pastor comes into clinic today at the request of Dr Yadav , for 6 minute walk    Tolerated testing well. No adverse reactions. Left lab in no distress.        DIANA BERGER

## 2024-05-16 LAB
DLCOUNC-%PRED-PRE: 127 %
DLCOUNC-PRE: 21.63 ML/MIN/MMHG
DLCOUNC-PRED: 16.96 ML/MIN/MMHG
ERV-%PRED-PRE: 18 %
ERV-PRE: 0.15 L
ERV-PRED: 0.83 L
EXPTIME-PRE: 5.35 SEC
FEF2575-%PRED-PRE: 92 %
FEF2575-PRE: 1.52 L/SEC
FEF2575-PRED: 1.64 L/SEC
FEFMAX-%PRED-PRE: 118 %
FEFMAX-PRE: 6.03 L/SEC
FEFMAX-PRED: 5.08 L/SEC
FEV1-%PRED-PRE: 103 %
FEV1-PRE: 1.86 L
FEV1FEV6-PRE: 78 %
FEV1FEV6-PRED: 79 %
FEV1FVC-PRE: 78 %
FEV1FVC-PRED: 80 %
FEV1SVC-PRE: 76 %
FEV1SVC-PRED: 68 %
FIFMAX-PRE: 4.81 L/SEC
FRCPLETH-%PRED-PRE: 88 %
FRCPLETH-PRE: 2.17 L
FRCPLETH-PRED: 2.45 L
FVC-%PRED-PRE: 105 %
FVC-PRE: 2.38 L
FVC-PRED: 2.26 L
IC-%PRED-PRE: 139 %
IC-PRE: 2.31 L
IC-PRED: 1.66 L
RVPLETH-%PRED-PRE: 109 %
RVPLETH-PRE: 2.01 L
RVPLETH-PRED: 1.83 L
TLCPLETH-%PRED-PRE: 106 %
TLCPLETH-PRE: 4.47 L
TLCPLETH-PRED: 4.18 L
VA-%PRED-PRE: 98 %
VA-PRE: 3.84 L
VC-%PRED-PRE: 93 %
VC-PRE: 2.46 L
VC-PRED: 2.64 L

## 2024-05-20 ENCOUNTER — DOCUMENTATION ONLY (OUTPATIENT)
Dept: SLEEP MEDICINE | Facility: CLINIC | Age: 69
End: 2024-05-20
Payer: COMMERCIAL

## 2024-05-20 NOTE — PROGRESS NOTES
STM Recheck:  Patient called she is getting nasal congestion from using her CPAP machine. Patient has a history of allergies and takes medicine each morning to treat them. Increased patient humidification from 4 to 5.

## 2024-05-21 DIAGNOSIS — E66.811 OBESITY (BMI 30.0-34.9): ICD-10-CM

## 2024-05-21 DIAGNOSIS — R73.03 PREDIABETES: ICD-10-CM

## 2024-05-22 ENCOUNTER — MYC MEDICAL ADVICE (OUTPATIENT)
Dept: FAMILY MEDICINE | Facility: CLINIC | Age: 69
End: 2024-05-22
Payer: COMMERCIAL

## 2024-05-22 DIAGNOSIS — E78.5 HYPERLIPIDEMIA LDL GOAL <100: Primary | ICD-10-CM

## 2024-05-22 RX ORDER — ROSUVASTATIN CALCIUM 10 MG/1
10 TABLET, COATED ORAL DAILY
Qty: 90 TABLET | Refills: 1 | Status: SHIPPED | OUTPATIENT
Start: 2024-05-22

## 2024-05-22 NOTE — TELEPHONE ENCOUNTER
Refill request for rosuvastatin 10mg  Previously prescribed by Tayla العلي pended     Radha DASILVA RN  [facility] [place]

## 2024-05-24 ENCOUNTER — DOCUMENTATION ONLY (OUTPATIENT)
Dept: SLEEP MEDICINE | Facility: CLINIC | Age: 69
End: 2024-05-24
Payer: COMMERCIAL

## 2024-05-24 NOTE — PROGRESS NOTES
3 day Sleep therapy management telephone visit    Diagnostic AHI:  HST: 25.9        Confirmed with patient at time of call- N/A Patient is still interested in STM service       Message left for patient to return call    Order settings:  CPAP MIN CPAP MAX   5 cm H2O 15 cm H2O       Device settings:  CPAP MIN CPAP MAX EPR RESMED SOFT RESPONSE SETTING   4.0 cm  H20 20.0 cm  H20 ONE OFF       Compliance 33 %    Assessment: Nightly usage most nights under four hours      Patient has the following upcoming sleep appts:  Future Sleep Appointments         Provider Department    8/22/2024 1:00 PM (Arrive by 12:45 PM) Gia Ortiz MD [facility] [place]            Replacement device: No  STM ordered by provider: Yes     Total time spent on accessing and  interpreting remote patient PAP therapy data  10 minutes    Total time spent counseling, coaching  and reviewing PAP therapy data with patient  1 minutes    [id number] no

## 2024-06-04 ENCOUNTER — MYC MEDICAL ADVICE (OUTPATIENT)
Dept: FAMILY MEDICINE | Facility: CLINIC | Age: 69
End: 2024-06-04
Payer: COMMERCIAL

## 2024-06-04 DIAGNOSIS — I10 BENIGN ESSENTIAL HYPERTENSION: Primary | ICD-10-CM

## 2024-06-04 DIAGNOSIS — K21.00 GASTROESOPHAGEAL REFLUX DISEASE WITH ESOPHAGITIS WITHOUT HEMORRHAGE: ICD-10-CM

## 2024-06-04 RX ORDER — LISINOPRIL 20 MG/1
20 TABLET ORAL DAILY
Qty: 90 TABLET | Refills: 3 | Status: SHIPPED | OUTPATIENT
Start: 2024-06-04

## 2024-06-05 ENCOUNTER — VIRTUAL VISIT (OUTPATIENT)
Dept: PHARMACY | Facility: CLINIC | Age: 69
End: 2024-06-05
Payer: COMMERCIAL

## 2024-06-05 DIAGNOSIS — E66.811 CLASS 1 OBESITY DUE TO EXCESS CALORIES WITH SERIOUS COMORBIDITY AND BODY MASS INDEX (BMI) OF 32.0 TO 32.9 IN ADULT: ICD-10-CM

## 2024-06-05 DIAGNOSIS — I10 BENIGN ESSENTIAL HYPERTENSION: ICD-10-CM

## 2024-06-05 DIAGNOSIS — E66.811 OBESITY (BMI 30.0-34.9): Primary | ICD-10-CM

## 2024-06-05 DIAGNOSIS — R73.03 PREDIABETES: ICD-10-CM

## 2024-06-05 DIAGNOSIS — G47.33 OSA (OBSTRUCTIVE SLEEP APNEA): ICD-10-CM

## 2024-06-05 DIAGNOSIS — E66.09 CLASS 1 OBESITY DUE TO EXCESS CALORIES WITH SERIOUS COMORBIDITY AND BODY MASS INDEX (BMI) OF 32.0 TO 32.9 IN ADULT: ICD-10-CM

## 2024-06-05 PROCEDURE — 99606 MTMS BY PHARM EST 15 MIN: CPT | Mod: 93 | Performed by: PHARMACIST

## 2024-06-05 NOTE — PROGRESS NOTES
Medication Therapy Management (MTM) Encounter    ASSESSMENT:                            Medication Adherence/Access: No issues identified    Weight management/Prediabetes:   Improved, reasonable to continue increasing the dose of semaglutide for additional weight loss.  Education to monitor side effects, notify clinic if side effects worsen or become bothersome. She will wait to use up current month supply of her semaglutide compound then refill at higher dose. Continue compounded product due to Wegovy shortage and cost.    Hypertension:   Controlled. Last blood pressure in clinic was rather low, encouraged she continue home monitoring and reach out if values are low or becomes symptomatic of hypotension, as she will likely need lower doses of antihypertensives with weight loss.    TOM:  Consider addition of otc nasal steroid such as fluticasone for nasal congestion. Encouraged she continue to work on improving ability to wear CPAP to see how this improves her daytime energy level. If symptoms persist, encouraged follow-up PCP visit for further workup.     PLAN:                            Finish semaglutide 0.5mg every 7 days, then increase to 1mg every 7 days    Consider fluticasone nasal spray for sinus congestion if needed    Follow-up: 3 months    SUBJECTIVE/OBJECTIVE:                          Herminia Pastor is a 68 year old female called for a follow-up visit.       Reason for visit: recheck semaglutide.    Allergies/ADRs: Reviewed in chart  Past Medical History: Reviewed in chart  Tobacco: She reports that she has been smoking cigarettes. She has a 12.5 pack-year smoking history. She has never used smokeless tobacco.    Alcohol: 1-3 beverages / week    Medication Adherence/Access: no issues reported    Weight Management /Prediabetes:    Wegovy (compounded semaglutide) 0.5 mg once weekly   Patient reports the following medication side effects: GI upset, no nausea, .    She lost about 10 pounds after the first 2  "months, then plateau since then. She wonders about a dose increase.  Goal weight 120-130 pounds. Currently reports 159 on home scale.  Nutrition/Eating Habits: decreased appetite since starting semaglutide. Eats 2 small meals and 1 large meal per day.     Exercise/Activity: has been exercising, sometimes takes off for a few days with upset stomach after her semaglutide injection.   Medication History:  Bupropion: not effective for weight loss, was taking for smoking cessation  Metformin: diarrhea     Wt Readings from Last 4 Encounters:   05/03/24 165 lb (74.8 kg)   01/19/24 174 lb 8 oz (79.2 kg)   01/03/24 172 lb (78 kg)   12/31/19 171 lb (77.6 kg)     Estimated body mass index is 32.27 kg/m  as calculated from the following:    Height as of 5/3/24: 4' 11.96\" (1.523 m).    Weight as of 5/3/24: 165 lb (74.8 kg).    Hypertension   Amlodipine 10 mg daily  Lisinopril 20 mg daily    Has been checking blood pressure at home and reports 120-130s/60-70s    Patient reports no current medication side effects.          BP Readings from Last 3 Encounters:   05/03/24 93/58   01/19/24 134/78   01/03/24 (!) 162/74      TOM:   She is working on increasing the amount of  time she can wear her mask. Having trouble with sinus congestion after about 4 hours. Humidity and nasal saline have been somewhat effective.   She continues to feel like her energy level is very low during the day.     Today's Vitals: LMP  (LMP Unknown)   ----------------      I spent 11 minutes with this patient today. All changes were made via collaborative practice agreement with Roderick Benson CNP. A copy of the visit note was provided to the patient's provider(s).    A summary of these recommendations was sent via [employer].    Kathe Moody, PharmD, BCACP   Medication Therapy Management Pharmacist   [facility]  [phone]     Telemedicine Visit Details  Type of service:  Telephone visit  Start Time: 1:04 " PM  End Time:  1:15 PM     Medication Therapy Recommendations  Obesity (BMI 30.0-34.9)    Current Medication: COMPOUNDED NON-CONTROLLED SUBSTANCE (CMPD RX) - PHARMACY TO MIX COMPOUNDED MEDICATION   Rationale: Dose too low - Dosage too low - Effectiveness   Recommendation: Increase Dose   Status: Accepted per CPA

## 2024-06-05 NOTE — PATIENT INSTRUCTIONS
"Recommendations from today's MTM visit:                                                         Finish your supply of semaglutide 0.5mg every 7 days, then increase to 1mg every 7 days    For sinus congestion, consider over-the-counter Flonase nasal spray    Follow-up: 3 months    It was great speaking with you today.  I value your experience and would be very thankful for your time in providing feedback in our clinic survey. In the next few days, you may receive an email or text message from [facility] [employer] with a link to a survey related to your  clinical pharmacist.\"     To schedule another MTM appointment, please call the clinic directly or you may call the MTM scheduling line at [phone] or toll-free at [phone].     My Clinical Pharmacist's contact information:                                                      Please feel free to contact me with any questions or concerns you have.      Kathe Moody, PharmD, BCACP   Medication Therapy Management Pharmacist   [facility]  [phone]    "

## 2024-06-11 ENCOUNTER — DOCUMENTATION ONLY (OUTPATIENT)
Dept: SLEEP MEDICINE | Facility: CLINIC | Age: 69
End: 2024-06-11
Payer: COMMERCIAL

## 2024-06-11 NOTE — PROGRESS NOTES
30 DAY STM VISIT    Diagnostic AHI:  HST: 25.9        PAP settings:  CPAP MIN CPAP MAX 95TH % PRESSURE EPR RESMED SOFT RESPONSE SETTING   4.0 cm  H20 20.0 cm  H20 8.9 cm  H20  ONE OFF     Device type: Auto-CPAP  Mask type:      Objective measures: 14 day rolling measures:    COMPLIANCE LEAK AHI AVERAGE USE IN MINUTES   85 % 4.08 0.79 354   GOAL >70% GOAL < 24 LPM GOAL <5 GOAL >240        Assessment: Pt meeting objective benchmarks.  Patient failing following subjective benchmarks: not feeling benefit from therapy, dryness  , and congestion  Subjective measures: Pt reports she is getting longer use on CPAP but has been struggling with congestion midway through the night. She takes nasal spray before bed every night and sometimes mid-night before she resumes CPAP and goes back to sleep. She has nasal pillows and sometimes has air come out her mouth. Discussed oral moisturizers or chinstrap. We increased her humidity from 5-6. Pt congratulated on her increase in usage and encouraged to continue.  Pt was given my contact information and instructed to reach out with any questions or concerns.     Action plan: pt to have 6 month [facility] visit  Patient has the following upcoming sleep appts:  Future Sleep Appointments         Provider Department    8/22/2024 1:00 PM (Arrive by 12:45 PM) Gia Ortiz MD [facility] [place]          Total time spent on accessing and interpreting remote patient PAP therapy data  10 minutes    Total time spent counseling, coaching  and reviewing PAP therapy data with patient  2 minutes     [id number] this call  [id number] no  at 3 or 14 day [facility]

## 2024-07-16 ENCOUNTER — TELEPHONE (OUTPATIENT)
Dept: SLEEP MEDICINE | Facility: CLINIC | Age: 69
End: 2024-07-16
Payer: COMMERCIAL

## 2024-07-16 NOTE — TELEPHONE ENCOUNTER
PT'S USAGE HAS SLIPPED AGAIN AND SHE IS AT 33%, USING EVERY NIGHT BUT ONLY 1/3 OF THE NIGHTS ARE OVER 4 HRS.  CALLED HER ABOUT THIS AND SHE STATED SHE IS STRUGGLING W/ CONGESTION AND CAN'T USE THE MACHINE FOR VERY LONG.  ASKED ABOUT DOING A FFM BUT PT STATED SHE THINKS THIS WILL BE TOO CLAUSTROPHOBIC FOR HER.  SUGGESTED A BREATH RIGHT STRIP TO OPEN NASAL PASSAGES AND SEE IF SHE IS ABLE TO BREATH BETTER, SHE STATED SHE WILL TRY THIS.  TOLD HER SHE HAS UNTIL  8/8 TO GET HER USAGE UP OR SHE WILL HAVE TO RETURN THE MACHINE.  SHE EXPERSSED UNDERSTANDING.

## 2024-07-18 ENCOUNTER — MYC REFILL (OUTPATIENT)
Dept: PHARMACY | Facility: CLINIC | Age: 69
End: 2024-07-18
Payer: COMMERCIAL

## 2024-07-18 DIAGNOSIS — E66.811 OBESITY (BMI 30.0-34.9): ICD-10-CM

## 2024-07-18 DIAGNOSIS — R73.03 PREDIABETES: ICD-10-CM

## 2024-07-19 DIAGNOSIS — E66.811 OBESITY (BMI 30.0-34.9): ICD-10-CM

## 2024-07-19 DIAGNOSIS — R73.03 PREDIABETES: ICD-10-CM

## 2024-08-09 ENCOUNTER — TELEPHONE (OUTPATIENT)
Dept: SLEEP MEDICINE | Facility: CLINIC | Age: 69
End: 2024-08-09
Payer: COMMERCIAL

## 2024-08-09 NOTE — TELEPHONE ENCOUNTER
PT IS CURRENTLY AT 60% USAGE.  SPOKE W/ JAYANT WILHELM ABOUT IF WE HAVE SOME WIGGLE ROOM.  SHE STATED SCAN IN THE BEST REPORT FROM THE 90 DAYS AND THEN CHECK AGAIN ON MONDAY AND IF SHE HITS 70% SCAN THAT IN ALSO.  WE ARE WAITING FOR THE F/U VISIT TAKING PLACE ON 8/22 ANYWAY.  JAYANT WILHELM STATED I CAN SCAN BOTH D/L'S AND VISIT NOTES IN AND SEND TO [facility] AND ASK THEM TO ATTEMPT TO GET THE PA.  CALLED PT TO ENCOURAGE HER TO REALLY USE THE MACHINE THESE NEXT 3 NIGHTS.  SHE STATED SHE JUST GOT THE INSTRUCTIONS TO USE A BREATH RIGHT STRIP W/ HER MACHINE AND SINCE STARTING THAT SHE HAS BEEN ABLE TO USE IT BETTER.  SHE STATED SHE WILL REALLY WORK HARD THIS WEEKEND.

## 2024-08-12 ENCOUNTER — MYC REFILL (OUTPATIENT)
Dept: FAMILY MEDICINE | Facility: CLINIC | Age: 69
End: 2024-08-12
Payer: COMMERCIAL

## 2024-08-12 DIAGNOSIS — K21.00 GASTROESOPHAGEAL REFLUX DISEASE WITH ESOPHAGITIS WITHOUT HEMORRHAGE: ICD-10-CM

## 2024-08-21 ASSESSMENT — SLEEP AND FATIGUE QUESTIONNAIRES
HOW LIKELY ARE YOU TO NOD OFF OR FALL ASLEEP WHILE LYING DOWN TO REST IN THE AFTERNOON WHEN CIRCUMSTANCES PERMIT: MODERATE CHANCE OF DOZING
HOW LIKELY ARE YOU TO NOD OFF OR FALL ASLEEP WHILE WATCHING TV: SLIGHT CHANCE OF DOZING
HOW LIKELY ARE YOU TO NOD OFF OR FALL ASLEEP WHILE SITTING QUIETLY AFTER LUNCH WITHOUT ALCOHOL: WOULD NEVER DOZE
HOW LIKELY ARE YOU TO NOD OFF OR FALL ASLEEP WHILE SITTING AND TALKING TO SOMEONE: WOULD NEVER DOZE
HOW LIKELY ARE YOU TO NOD OFF OR FALL ASLEEP WHILE SITTING AND READING: SLIGHT CHANCE OF DOZING
HOW LIKELY ARE YOU TO NOD OFF OR FALL ASLEEP IN A CAR, WHILE STOPPED FOR A FEW MINUTES IN TRAFFIC: WOULD NEVER DOZE
HOW LIKELY ARE YOU TO NOD OFF OR FALL ASLEEP WHEN YOU ARE A PASSENGER IN A CAR FOR AN HOUR WITHOUT A BREAK: WOULD NEVER DOZE
HOW LIKELY ARE YOU TO NOD OFF OR FALL ASLEEP WHILE SITTING INACTIVE IN A PUBLIC PLACE: WOULD NEVER DOZE

## 2024-08-22 ENCOUNTER — OFFICE VISIT (OUTPATIENT)
Dept: SLEEP MEDICINE | Facility: CLINIC | Age: 69
End: 2024-08-22
Payer: COMMERCIAL

## 2024-08-22 VITALS
OXYGEN SATURATION: 95 % | BODY MASS INDEX: 30.06 KG/M2 | HEART RATE: 72 BPM | DIASTOLIC BLOOD PRESSURE: 72 MMHG | HEIGHT: 60 IN | WEIGHT: 153.1 LBS | SYSTOLIC BLOOD PRESSURE: 113 MMHG

## 2024-08-22 DIAGNOSIS — G47.33 OSA (OBSTRUCTIVE SLEEP APNEA): Primary | ICD-10-CM

## 2024-08-22 DIAGNOSIS — G47.34 NOCTURNAL HYPOXEMIA: ICD-10-CM

## 2024-08-22 PROCEDURE — 99214 OFFICE O/P EST MOD 30 MIN: CPT | Performed by: INTERNAL MEDICINE

## 2024-08-22 NOTE — PROGRESS NOTES
Chief complaint: Follow-up sleep apnea and CPAP    History of Present Illness: 68-year-old female with history of hypertension, loud disruptive snoring, insomnia after retiring.  She was found to have severe obstructive sleep apnea with hypoxemia.  She was recommended to start CPAP therapy.  She also had further evaluation of hypoxemia with pulmonary function testing and 6-minute walk testing.  She is a smoker and there was some emphysema noted on CTs in the past.  Pulmonary function testing and 6-minute walk testing were normal.      She reports difficulty acclimating to CPAP.  She notes that after she will start wearing it her nose will get plugged up.  She said this would happen less if she slept on her back.  She tried many different humidification settings. Eventually she tried Breathe Right strips which has been somewhat helpful.  With the device she is no longer snoring.  More recently she does feel that she is a little bit more rested.  She is also lost 20 pounds since her sleep test.  She did stop the semaglutide recently due to abdominal pain.  So far the weight has stayed off.    She is currently getting up around 8 to 9 AM and still going to bed around 10 PM.  She is tired at that time.  With CPAP it takes her an hour to fall asleep.      Overnight oximetry testing as ordered previously has not yet been completed.    Kin[place] Sleepiness Scale  Total score - Kin[place]: 4 (8/21/2024  3:31 PM)   (Less than 10 normal)    Insomnia Severity Scale  BEN Total Score: 11  (normal 0-7, mild 8-14, moderate 15-21, severe 22-28)    Past Medical History:   Diagnosis Date    Acoustic neuroma (H)     Allergic rhinitis 01/1980    Dry throat     Gastro-oesophageal reflux disease     Hearing loss     Heartburn     Hypertension     Itchy eyes     Night sweats     Ringing in ears     Sneezing     Snoring     Tinnitus     Visual loss     Weight gain        Allergies   Allergen Reactions    Dust Mites     Grass     Mold      Ragweeds Other (See Comments)     Crustiness in nose, headaches    Trees        Current Outpatient Medications   Medication Sig Dispense Refill    amLODIPine (NORVASC) 10 MG tablet Take 1 tablet (10 mg) by mouth daily 90 tablet 1    amLODIPine (NORVASC) 5 MG tablet Take 2 tablets (10 mg) by mouth daily 60 tablet 3    CALCIUM PO Take 300 mg by mouth daily      chlorpheniramine-pseudoePHEDrine (PSEUDO-GEST PLUS) 4-60 MG TABS Take 1 tablet by mouth every 6 hours as needed for allergies      Glucosamine-Chondroitin (GLUCOSAMINE CHONDR COMPLEX PO) Take 2,000 mg by mouth daily      lisinopril (ZESTRIL) 20 MG tablet Take 1 tablet (20 mg) by mouth daily 90 tablet 3    omeprazole (PRILOSEC) 20 MG DR capsule Take 1 capsule (20 mg) by mouth every morning 90 capsule 3    COMPOUNDED NON-CONTROLLED SUBSTANCE (CMPD RX) - PHARMACY TO MIX COMPOUNDED MEDICATION Semaglutide: inject 1 mg subcutaneously every 7 days (Patient not taking: Reported on 8/22/2024) 4 each 2    rosuvastatin (CRESTOR) 10 MG tablet Take 1 tablet (10 mg) by mouth daily (Patient not taking: Reported on 8/22/2024) 90 tablet 1     No current facility-administered medications for this visit.       Social History     Socioeconomic History    Marital status:      Spouse name: Not on file    Number of children: Not on file    Years of education: Not on file    Highest education level: Not on file   Occupational History    Not on file   Tobacco Use    Smoking status: Every Day     Current packs/day: 0.50     Average packs/day: 0.5 packs/day for 25.0 years (12.5 ttl pk-yrs)     Types: Cigarettes    Smokeless tobacco: Never   Vaping Use    Vaping status: Never Used   Substance and Sexual Activity    Alcohol use: Yes     Comment: occasional use- once every other week- 1-3 drinks (social)    Drug use: No    Sexual activity: Not Currently   Other Topics Concern    Parent/sibling w/ CABG, MI or angioplasty before 65F 55M? Not Asked   Social History Narrative    Not on  "file     Social Determinants of Health     Financial Resource Strain: Low Risk  (1/3/2024)    Financial Resource Strain     Within the past 12 months, have you or your family members you live with been unable to get utilities (heat, electricity) when it was really needed?: No   Food Insecurity: Low Risk  (1/3/2024)    Food Insecurity     Within the past 12 months, did you worry that your food would run out before you got money to buy more?: No     Within the past 12 months, did the food you bought just not last and you didn t have money to get more?: No   Transportation Needs: Low Risk  (1/3/2024)    Transportation Needs     Within the past 12 months, has lack of transportation kept you from medical appointments, getting your medicines, non-medical meetings or appointments, work, or from getting things that you need?: No   Physical Activity: Not on file   Stress: Not on file   Social Connections: Not on file   Interpersonal Safety: Low Risk  (1/3/2024)    Interpersonal Safety     Do you feel physically and emotionally safe where you currently live?: Yes     Within the past 12 months, have you been hit, slapped, kicked or otherwise physically hurt by someone?: No     Within the past 12 months, have you been humiliated or emotionally abused in other ways by your partner or ex-partner?: No   Housing Stability: Low Risk  (1/3/2024)    Housing Stability     Do you have housing? : Yes     Are you worried about losing your housing?: No       Family History   Problem Relation Age of Onset    Hypertension Mother     Cerebrovascular Disease Mother     Alzheimer Disease Mother     Depression Mother     Psychotic Disorder Mother     Dementia Mother 60    Snoring Father     Sleep Apnea Brother     Diabetes Maternal Grandmother     Diabetes Paternal Grandmother     Thyroid Disease Maternal Uncle            EXAM:  /72   Pulse 72   Ht 1.523 m (4' 11.96\")   Wt 69.4 kg (153 lb 1.6 oz)   LMP  (LMP Unknown)   SpO2 95%   BMI " 29.94 kg/m    GENERAL: alert and no distress  EYES: Eyes grossly normal to inspection.  No discharge or erythema, or obvious scleral/conjunctival abnormalities.  RESP: No audible wheeze, cough, or visible cyanosis.    SKIN: Visible skin clear. No significant rash, abnormal pigmentation or lesions.  NEURO: Cranial nerves grossly intact.  Mentation and speech appropriate for age.  PSYCH: Appropriate affect, tone, and pace of words       HSAT 2/14/2024  Weight 174 lbs BMI 34.12  AHI 25.9, supine AHI 53.3  Time with saturation less than or equal to 88% was 380 minutes. The lowest oxygen saturation was 72%.        to-BB[employer] air sense 11 auto PAP download from 7/20/2024 to 8/18/2024 reviewed:  Per cent of days used greater than 4 Hours 70% (minimum goal greater than 70%)  Average use on days used: 5 hours 52 min  Settings: Min EPAP 4 cmH2O    Max EPAP 20 cmH2O  Pressures delivered 90/95th percentile for pressure 8.7 cmH2O  Average AHI 1.8 events per hour (goal less than 5)  Leak acceptable    TSH   Date Value Ref Range Status   01/03/2024 2.86 0.30 - 4.20 uIU/mL Final     Pulmonary function testing and 6-minute walk testing 5/15/2024 normal pulmonary function, no hypoxemia or desaturation, normal walk distance    ASSESSMENT:  68-year-old female with history of hypertension, snoring, severe sleep apnea, nocturnal hypoxemia.  She is meeting compliance goals and getting good benefit with normalization of AHI.  However she has some issues with sleep initiation insomnia partially related to CPAP.  It is possible that weight loss has decreased creased severity of the apnea.    PLAN:  Recommended overnight oximetry on CPAP to confirm resolution of hypoxemia.  If hypoxemia still present would recommend further evaluation with cardiac echo and bubble study.  Encourage smoking cessation.  Continue to try to use CPAP all night every night.  Try to go about an hour later to see if that helps with sleep initiation.  Continue efforts at  weight management and avoid gaining any weight.  Reconsider repeat diagnostic testing with the weight loss.      30 minutes spent by me on the date of the encounter doing chart review, history and exam, documentation and further activities per the note    Gia Ortiz M.D.  Pulmonary/Critical Care/Sleep Medicine    [facility]   Floor [address], [address]   [address] [place] [address]   Appointments: [phone]    The above note was dictated using voice recognition software and may include typographical errors. Please contact the author for any clarifications.

## 2024-08-22 NOTE — NURSING NOTE
CALLED [facility] WITH PT AND O2 TECHS WILL CALL BACK TO SCHEDULE [place] ON CPAP AND 56 MONTH Follow-up SCHEDULED, AVS PRINTED..    Sb Fish, Visit facilitator.

## 2024-08-22 NOTE — PATIENT INSTRUCTIONS
Try to go to bed a little later to see if its easier to fall asleep with the CPAP    For general sleep health questions:   https://www.thensf.org/sleep-health-topics/  http://sleepeducation.org    Medicare and Medicaid patients starting on CPAP or biPAP on or after 5/12/2023  Medicare patients should schedule an IN PERSON CLINIC appointment to provide your CPAP/biPAP usage data to a provider within 90 days of starting CPAP    For new ResMed devices, sign up for device devante to monitor your device for your followup visits  We encourage you to utilize the wooju devante or website (myAir web ([employer]) to monitor your therapy progress and share the data with your healthcare team when you discuss your sleep apnea.                                                      Know your equipment:  CPAP is continuous positive airway pressure that prevents obstructive sleep apnea by keeping the throat from collapsing while you are sleeping. In most cases, the device is  smart  and can slowly self-adjusts if your throat collapses and keeps a record every day of how well you are treated-this information is available to you and your care team.  BPAP is bilevel positive airway pressure that keeps your throat open and also assists each breath with a pressure boost to maintain adequate breathing.  Special kinds of BPAP are used in patients who have inadequate breathing from lung or heart disease. In most cases, the device is  smart  and can slowly self-adjusts to assist breathing. Like CPAP, the device keeps a record of how well you are treated.  Your mask is your connection to the device. You get to choose what feels most comfortable and the staff will help to make sure if fits.     *Masks with magnets:  Updated Contraindications  Masks with magnetic components are contraindicated for use by patients where they, or anyone in close physical contact while using the mask, have the following:   Active medical implants that interact  with magnets (i.e., pacemakers, implantable cardioverter defibrillators (ICD), neurostimulators, cerebrospinal fluid (CSF) shunts, insulin/infusion pumps)   Metallic implants/objects containing ferromagnetic material (i.e., aneurysm clips/flow disruption devices, embolic coils, stents, valves, electrodes, implants to restore hearing or balance with implanted magnets, ocular implants, metallic splinters in the eye)  Updated Warning  Keep the mask magnets at a safe distance of at least 6 inches (150 mm) away from implants or medical devices that may be adversely affected by magnetic interference. This warning applies to you or anyone in close physical contact with your mask. The magnets are in the frame and lower headgear clips, with a magnetic field strength of up to 400mT. When worn, they connect to secure the mask but may inadvertently detach while asleep.  Implants/medical devices, including those listed within contraindications, may be adversely affected if they change function under external magnetic fields or contain ferromagnetic materials that attract/repel to magnetic fields (some metallic implants, e.g., contact lenses with metal, dental implants, metallic cranial plates, screws, katrin hole covers, and bone substitute devices). Consult your physician and  of your implant / other medical device for information on the potential adverse effects of magnetic fields.      Continue PAP therapy every night, for all hours that you are sleeping (including naps.)  As always, try to get at least 8 hours of sleep or more each day, keep a regular sleep schedule, and avoid sleep deprivation. Avoid alcohol.  Reasons that you might need a change to your pressure therapy would be weight gain or loss, waking having inadvertently removed your PAP overnight, having previously felt refreshed by sleep with CPAP use and now waking un-refreshed, and return of daytime sleepiness. Also, the development of new medical  problems  (such as heart failure, stroke, medications such as narcotics) can sometimes affect breathing at night and change your PAP therapy needs.  Please bring PAP with you if you are hospitalized.  If anticipating surgery be sure to discuss with your surgeon that you have sleep apnea and use PAP therapy.    Maintain your equipment as recommended which includes routine cleaning and replacement of supplies.      Call DME for any questions regarding supplies or maintenance.    [facility], [facility] [phone]    Do not drive on engage in potentially dangerous activities if feeling sleepy.    Please follow up in sleep clinic again in 6 months.        Tips for your PAP use-    Mask fitting tips  Mask fitting exercise:    To improve your mask seal and your mobility at night, put mask on and secure in place.  Lie down in bed with full pressure and roll to one side, adjust headgear while in that position to eliminate any leaks. Repeat process rolling to other side.     The mask seal does not have to be perfect:   CPAP machines are designed to make up for small leaks. However, you will not tolerate leaks blowing in your eyes so you will need to adjust.   Any leak should only be near or at the bottom of the mask.  We expect your mask to leak slightly at night.    Do not over-tighten the headgear straps, tighter IS NOT better, we expect minimal leak.    First try re-positioning the mask or headgear before tightening the headgear straps.  Mask leaks are expected due to changing sleeping positions. Try pulling the mask away from your skin allowing the cushion to re-inflate will minimize the leak.  If you struggle for a good fit, try turning the CPAP off and then readjust the mask by pulling it away from your face and then turning back on the CPAP.        Humidifier tips  Humidifiers can be adjusted to increase or decrease the amount of moisture according to your comfort level.  You may need to adjust this frequently at first, but then might only change it with seasonal weather changes.     Try INCREASING the humidity if:  You experience a dry, irritated nasal passage or throat.  You have a runny, drippy nose or sneezing fits after using CPAP.  You experience nasal congestion during or after CPAP use.    Try DECREASING the humidity if:  You have excessive condensation or  rain out  in the tubing or mask.  Otherwise keep the tubing warm during the night by running it underneath the blankets or pillow.      Clinic visit after initial PAP set-up   Bring your equipment with you to your 5-8 week follow up clinic visit.  We will be extracting your data from the machine if not available from the cloud based [employer]        Travel  Always take your equipment with you when you travel.  If you fly with your equipment bring it on with you as a carry on.  Medical equipment does not count as a carry on.    If you travel international the machines take 110-240v.  The only adapter needed is the adapter that will fit into the receptacle (outlet).    You may also want to bring an extension cord as many hotel rooms have limited outlets at the bedside.  Do not travel with water in your humidifier chamber.     Cleaning and Maintenance Guidelines    Equipment Frequency Cleaning Method   Mask First Day    Daily      Weekly Soak mask in hot soapy water for 30 minutes, rinse and air dry.  Wipe nasal cushion with a hot soapy (Ivory, baby shampoo) cloth and rinse.  Baby wipes may also be used.  Do not use anti-bacterial soaps,Didi  liquid soap, rubbing alcohol, bleach or ammonia.  Wash frame in hot soapy water (Ivory, baby shampoo) rinse and let air dry   Headgear Biweekly Wash in hot soapy water, rinse and air dry   Reusable Gray Filter Weekly Wash in hot soapy water, rinse, put in towel squeeze moisture out, let air dry   Disposable White Filter Check Weekly Replace when brown or gray in color; at least every 2 to 3  months   Humidifier Chamber Daily    Weekly Empty distilled water from humidifier and let air dry    Hand wash in hot soapy water, rinse and air dry   Tubing Weekly Wash in hot soapy water, rinse and let air dry   Mask, Tubing and Humidifier Chamber As needed Disinfect: Soak in 1 part distilled white vinegar to 3 parts hot water for 30 minutes, rinse well and air dry  Not the material headgear        MASK AND SUPPLY REORDERING and EQUIPMENT NEEDS through your DME and per your insurance  Reminder: Most insurance companies will allow for a new mask, headgear, tubing, and reusable gray filter every six months.  Disposable white ultra-fine filters are covered monthly.      HOME AND SAFETY INSTRUCTIONS  Do not use frayed or cracked electrical cords, multi plug adaptors, or switched receptacles  Do not immerse electrical equipment into water  Assure that electrical cords do not become a tripping hazard

## 2024-08-26 ENCOUNTER — MYC REFILL (OUTPATIENT)
Dept: FAMILY MEDICINE | Facility: CLINIC | Age: 69
End: 2024-08-26
Payer: COMMERCIAL

## 2024-08-26 DIAGNOSIS — R73.03 PREDIABETES: ICD-10-CM

## 2024-08-26 DIAGNOSIS — E66.811 OBESITY (BMI 30.0-34.9): ICD-10-CM

## 2024-08-30 DIAGNOSIS — G47.33 OSA (OBSTRUCTIVE SLEEP APNEA): ICD-10-CM

## 2024-10-01 ENCOUNTER — VIRTUAL VISIT (OUTPATIENT)
Dept: PHARMACY | Facility: CLINIC | Age: 69
End: 2024-10-01
Payer: COMMERCIAL

## 2024-10-01 DIAGNOSIS — R73.03 PREDIABETES: ICD-10-CM

## 2024-10-01 DIAGNOSIS — E66.811 OBESITY (BMI 30.0-34.9): Primary | ICD-10-CM

## 2024-10-01 DIAGNOSIS — Z71.85 VACCINE COUNSELING: ICD-10-CM

## 2024-10-01 DIAGNOSIS — I10 BENIGN ESSENTIAL HYPERTENSION: ICD-10-CM

## 2024-10-01 PROCEDURE — 99606 MTMS BY PHARM EST 15 MIN: CPT | Mod: 93 | Performed by: PHARMACIST

## 2024-10-01 NOTE — PROGRESS NOTES
Medication Therapy Management (MTM) Encounter    ASSESSMENT:                            Medication Adherence/Access: See below for considerations    Hypertension:   Stable. Reviewed symptoms of overmedication as she loses weight.  No dose adjustments needed today.     Weight management  Discussed option to trial titration of semaglutide to higher doses as tolerated and she is agreeable. Will plan to increase by 0.5mg every 4 weeks as tolerated.    Vaccine  Due for RSV, flu, Covid vaccines. She will follow-up with her pharmacy for administration.     PLAN:                            Increase semaglutide to 1mg every 7 days x1 month.  If tolerated, increase to 1.5mg every 7 days     Patient to follow-up with pharmacy for vaccines - RSV, flu, Covid    Follow-up: 2 months    SUBJECTIVE/OBJECTIVE:                          Herminia Pastor is a 69 year old female seen for a follow-up visit.       Reason for visit: medication recheck.    Allergies/ADRs: Reviewed in chart  Past Medical History: Reviewed in chart  Tobacco: She reports that she has been smoking cigarettes. She has a 12.5 pack-year smoking history. She has never used smokeless tobacco.  Alcohol: 1-3 beverages / week    Medication Adherence/Access: no issues reported    Hypertension   Amlodipine 10 mg daily  Lisinopril 20 mg daily    Has been checking blood pressure at home and reports 114/70    Patient reports no current medication side effects.        Weight Management   /Prediabetes:    Wegovy (compounded semaglutide) 0.75 mg once weekly     Patient reports the following medication side effects: stomach pain for 5 days, decided to discontinue semaglutide until stomach pain resolved, then slowly restarted at 0.25mg and increasing by 0.25mg every 2 weeks.  She has not had any recurrence of stomach pain.     She lost about 10 pounds after the first 2 months, then plateau since then. Wonders if she should continue the medication.     Goal weight 120-130 pounds.  "  Nutrition/Eating Habits: decreased appetite since starting semaglutide.   Exercise/Activity: has been exercising again since stomach pain resolved.  30 minutes walking per day  Medication History:  Bupropion: not effective for weight loss, was taking for smoking cessation  Metformin: diarrhea           Wt Readings from Last 4 Encounters:   08/22/24 153 lb 1.6 oz (69.4 kg)   05/03/24 165 lb (74.8 kg)   01/19/24 174 lb 8 oz (79.2 kg)   01/03/24 172 lb (78 kg)     Estimated body mass index is 29.94 kg/m  as calculated from the following:    Height as of 8/22/24: 4' 11.96\" (1.523 m).    Weight as of 8/22/24: 153 lb 1.6 oz (69.4 kg).    Vaccine  Plans to have her vaccines updated at [facility] pharmacy this fall.     Today's Vitals: LMP  (LMP Unknown)   ----------------      I spent 10 minutes with this patient today. All changes were made via collaborative practice agreement with Roderick Benson CNP. A copy of the visit note was provided to the patient's provider(s).    A summary of these recommendations was sent via [employer].    Kathe Moody, PharmD, BCACP   Medication Therapy Management Pharmacist   [facility]  [phone]     Telemedicine Visit Details  The patient's medications can be safely assessed via a telemedicine encounter.  Type of service:  Telephone visit  Originating Location (pt. Location): Home    Distant Location (provider location):  On-site  Start Time: 3:02 PM  End Time:  3:10 PM     Medication Therapy Recommendations  Obesity (BMI 30.0-34.9)    Current Medication: COMPOUNDED NON-CONTROLLED SUBSTANCE (CMPD RX) - PHARMACY TO MIX COMPOUNDED MEDICATION   Rationale: Dose too low - Dosage too low - Effectiveness   Recommendation: Increase Dose   Status: Accepted per CPA         Vaccine counseling    Rationale: Preventive therapy - Needs additional medication therapy - Indication   Recommendation: Start Medication - INFLUENZA VAC HIGH-DOSE QUAD   Status: " Accepted - no CPA Needed

## 2024-10-01 NOTE — PATIENT INSTRUCTIONS
"Recommendations from today's MTM visit:                                                       Increase semaglutide to 1mg every 7 days x4 weeks.  If no side effects, then increase to 1.5mg every 7 days     Follow-up with pharmacy for vaccines - RSV, flu, Covid    Follow-up: 2 months    It was great speaking with you today.  I value your experience and would be very thankful for your time in providing feedback in our clinic survey. In the next few days, you may receive an email or text message from [facility] "[employer]" with a link to a survey related to your  clinical pharmacist.\"     To schedule another MTM appointment, please call the clinic directly or you may call the MTM scheduling line at [phone] or toll-free at [phone].     My Clinical Pharmacist's contact information:                                                      Please feel free to contact me with any questions or concerns you have.      Kathe Moody, PharmD, BCACP   Medication Therapy Management Pharmacist   [facility]  [phone]    "

## 2024-10-02 ENCOUNTER — MYC REFILL (OUTPATIENT)
Dept: PHARMACY | Facility: CLINIC | Age: 69
End: 2024-10-02
Payer: COMMERCIAL

## 2024-10-02 DIAGNOSIS — E66.811 OBESITY (BMI 30.0-34.9): ICD-10-CM

## 2024-10-02 DIAGNOSIS — R73.03 PREDIABETES: ICD-10-CM

## 2024-11-19 ENCOUNTER — MYC REFILL (OUTPATIENT)
Dept: FAMILY MEDICINE | Facility: CLINIC | Age: 69
End: 2024-11-19
Payer: COMMERCIAL

## 2024-11-19 DIAGNOSIS — I10 BENIGN ESSENTIAL HYPERTENSION: ICD-10-CM

## 2024-11-19 RX ORDER — LISINOPRIL 20 MG/1
20 TABLET ORAL DAILY
Qty: 90 TABLET | Refills: 0 | Status: SHIPPED | OUTPATIENT
Start: 2024-11-19

## 2024-11-19 RX ORDER — AMLODIPINE BESYLATE 10 MG/1
10 TABLET ORAL DAILY
Qty: 90 TABLET | Refills: 0 | Status: SHIPPED | OUTPATIENT
Start: 2024-11-19

## 2025-02-18 ENCOUNTER — MYC REFILL (OUTPATIENT)
Dept: PHARMACY | Facility: CLINIC | Age: 70
End: 2025-02-18
Payer: COMMERCIAL

## 2025-02-18 DIAGNOSIS — R73.03 PREDIABETES: ICD-10-CM

## 2025-02-18 DIAGNOSIS — E66.811 OBESITY (BMI 30.0-34.9): ICD-10-CM

## 2025-02-18 DIAGNOSIS — I10 BENIGN ESSENTIAL HYPERTENSION: ICD-10-CM

## 2025-02-19 RX ORDER — LISINOPRIL 20 MG/1
20 TABLET ORAL DAILY
Qty: 90 TABLET | Refills: 0 | Status: SHIPPED | OUTPATIENT
Start: 2025-02-19

## 2025-02-19 RX ORDER — AMLODIPINE BESYLATE 10 MG/1
10 TABLET ORAL DAILY
Qty: 90 TABLET | Refills: 0 | Status: SHIPPED | OUTPATIENT
Start: 2025-02-19

## 2025-03-04 ENCOUNTER — VIRTUAL VISIT (OUTPATIENT)
Dept: PHARMACY | Facility: CLINIC | Age: 70
End: 2025-03-04
Payer: COMMERCIAL

## 2025-03-04 DIAGNOSIS — E78.5 HYPERLIPIDEMIA LDL GOAL <100: ICD-10-CM

## 2025-03-04 DIAGNOSIS — R73.03 PREDIABETES: ICD-10-CM

## 2025-03-04 DIAGNOSIS — J30.2 SEASONAL ALLERGIC RHINITIS, UNSPECIFIED TRIGGER: ICD-10-CM

## 2025-03-04 DIAGNOSIS — Z78.9 TAKES DIETARY SUPPLEMENTS: ICD-10-CM

## 2025-03-04 DIAGNOSIS — G47.33 OSA (OBSTRUCTIVE SLEEP APNEA): ICD-10-CM

## 2025-03-04 DIAGNOSIS — E66.811 OBESITY (BMI 30.0-34.9): Primary | ICD-10-CM

## 2025-03-04 DIAGNOSIS — K21.9 GASTROESOPHAGEAL REFLUX DISEASE WITHOUT ESOPHAGITIS: ICD-10-CM

## 2025-03-04 DIAGNOSIS — I10 BENIGN ESSENTIAL HYPERTENSION: ICD-10-CM

## 2025-03-04 PROCEDURE — 99605 MTMS BY PHARM NP 15 MIN: CPT | Mod: 93 | Performed by: PHARMACIST

## 2025-03-04 PROCEDURE — 99607 MTMS BY PHARM ADDL 15 MIN: CPT | Mod: 93 | Performed by: PHARMACIST

## 2025-03-04 RX ORDER — TIRZEPATIDE 2.5 MG/.5ML
2.5 INJECTION, SOLUTION SUBCUTANEOUS
Qty: 2 ML | Refills: 0 | Status: SHIPPED | OUTPATIENT
Start: 2025-03-04

## 2025-03-04 NOTE — LETTER
_  Medication List        Prepared on: Mar 4, 2025     Bring your Medication List when you go to the doctor, hospital, or   emergency room. And, share it with your family or caregivers.     Note any changes to how you take your medications.  Cross out medications when you no longer use them.    Medication How I take it Why I use it Prescriber   amLODIPine (NORVASC) 10 MG tablet Take 1 tablet (10 mg) by mouth daily. Benign Essential Hypertension Roderick Benson NP   CALCIUM PO Take 300 mg by mouth daily  General Health  Patient Reported   chlorpheniramine-pseudoePHEDrine (PSEUDO-GEST PLUS) 4-60 MG TABS Take 1 tablet by mouth every 6 hours as needed for allergies  Allergies Patient Reported   COMPOUNDED NON-CONTROLLED SUBSTANCE (CMPD RX) - PHARMACY TO MIX COMPOUNDED MEDICATION Semaglutide: inject 1 mg subcutaneously every 7 days Obesity (BMI 30.0-34.9); Prediabetes Roderick Benson NP   Glucosamine-Chondroitin (GLUCOSAMINE CHONDR COMPLEX PO) Take 2,000 mg by mouth daily  General Health  Patient Reported   lisinopril (ZESTRIL) 20 MG tablet Take 1 tablet (20 mg) by mouth daily. Benign Essential Hypertension Roderick Benson NP   omeprazole (PRILOSEC) 20 MG DR capsule Take 1 capsule (20 mg) by mouth every morning Gastroesophageal reflux disease with esophagitis without hemorrhage Roderick Benson NP   ZEPBOUND 2.5 MG/0.5ML prefilled pen Inject 0.5 mLs (2.5 mg) subcutaneously every 7 days. Obesity (BMI 30.0-34.9); TOM (Obstructive Sleep Apnea); Benign Essential Hypertension; Hyperlipidemia LDL Goal <100; Prediabetes Roderick Benson NP         Add new medications, over-the-counter drugs, herbals, vitamins, or  minerals in the blank rows below.    Medication How I take it Why I use it Prescriber                                      Allergies:      - Dust Mites  - Grass  - Mold  - Ragweeds - Other (See Comments)  - Trees        Side effects I have had:      Not on File        Other Information:              My notes and  questions:

## 2025-03-04 NOTE — LETTER
March 4, 2025  Herminia Pastor  [address] [place]   [place] [id number]    Dear MASON Brady [facility]     Thank you for talking with me on Mar 4, 2025 about your health and medications. As a follow-up to our conversation, I have included two documents:      Your Recommended To-Do List has steps you should take to get the best results from your medications.  Your Medication List will help you keep track of your medications and how to take them.    If you want to talk about these documents, please call Kathe Moody RPH at phone: [phone], Monday-Friday 8-4:30pm.    I look forward to working with you and your doctors to make sure your medications work well for you.    Sincerely,  Kathe Moody RPH  [facility] Pharmacist, [facility]

## 2025-03-04 NOTE — LETTER
"Recommended To-Do List      Prepared on: Mar 4, 2025       You can get the best results from your medications by completing the items on this \"To-Do List.\"      Bring your To-Do List when you go to your doctor. And, share it with your family or caregivers.    My To-Do List:  What we talked about: What I should do:   The cost of your medication(s)    Change the medication you are taking from COMPOUNDED NON-CONTROLLED SUBSTANCE (CMPD RX) to Zepbound          What we talked about: What I should do:                     "

## 2025-03-04 NOTE — PROGRESS NOTES
Medication Therapy Management (MTM) Encounter    ASSESSMENT:                            Medication Adherence/Access: No issues identified.    Hypertension  Stable    Weight management/prediabetes:   Discussed end to compounding of semaglutide and alternative options, including switching to compounded tirzepatide, switch to Zepbound (with indication for TOM), use of [employer] for Zepbound, and oral options. She is interested in Zepbound through insurance, will start with lower dose due to history of GI intolerance. She will restart semaglutide for the time being since she has already ordered this.    TOM  May have improved given weight loss. Consider additional workup of fatigue if patient desires. Could consider trial of nasal steroid to help with mask use. Followup further discussion with PCP.    Hyperlipidemia  Reasonable to recheck FLP prior to restarting statin, follow-up with PCP    GERD  Stable    Supplement  Stable    Allergy  Stable. Daily pseudoephedrine does not seem to be increasing blood pressure, OK to monitor.     PLAN:                            Restart semaglutide at 0.25mg every 7 days x2 weeks, then increase to 0.5mg every 7 days    Pending insurance authorization:  Stop semaglutide  Start Zepbound 2.5mg every 7 days    Follow-up: 2 months    SUBJECTIVE/OBJECTIVE:                          Herminia Pastor is a 69 year old female seen for a follow-up visit.       Reason for visit: medication recheck.    Allergies/ADRs: Reviewed in chart  Past Medical History: Reviewed in chart  Tobacco: She reports that she has been smoking cigarettes. She has a 12.5 pack-year smoking history. She has never used smokeless tobacco.  Alcohol: 1-3 beverages / week    Medication Adherence/Access: no issues reported.    Hypertension   Amlodipine 10 mg daily  Lisinopril 20 mg daily    Has been checking blood pressure at home and reports 117/70    Patient reports no current medication side effects.            Weight  "Management   /Prediabetes:    Wegovy (compounded semaglutide) - stopped 2.5mg dose a few months ago, just ordered a new prescription from pharmacy to restart since hunger significantly increased and gained 5 lb back.    Patient reports the following medication side effects: stomach pain occasionally with semaglutide.     Starting weight 174 lb  Goal weight 120-130 lb     Nutrition/Eating Habits: decreased appetite with semaglutide.     Exercise/Activity:  30 minutes walking per day, treadmill    Medication History:  Bupropion: not effective for weight loss, was taking for smoking cessation  Metformin: diarrhea       Current weight today: 148 lb, patient reported   Cumulative Weight Loss: -26 lb, -14.9% from baseline    Wt Readings from Last 4 Encounters:   08/22/24 153 lb 1.6 oz (69.4 kg)   05/03/24 165 lb (74.8 kg)   01/19/24 174 lb 8 oz (79.2 kg)   01/03/24 172 lb (78 kg)     Estimated body mass index is 29.94 kg/m  as calculated from the following:    Height as of 8/22/24: 4' 11.96\" (1.523 m).    Weight as of 8/22/24: 153 lb 1.6 oz (69.4 kg).    TOM:   Unable to tolerate CPAP, tried for about 9 months.   She has been feeling tired, unsure why and concerned it may have been depression. However with weight loss she has been feeling somewhat better.    Hyperlipidemia   rosuvastatin 10mg daily - stopped taking   Patient reports the following medication side effects: stomach pain.  She would like to have lipids rechecked before restarting statin because of weight loss.    GERD  Omeprazole 20mg daily  Works well, no concerns.    Supplement  Glucosamine daily, works well  Calcium daily  Vitamin B12 daily - started taking to boost energy, feels like this helped a little? Unsure.     Allergy  Chlorpheniramine-pseudoephedrine once daily in the morning, typically only needs once a day. Increases to twice a day in the spring with budding trees.      Today's Vitals: LMP  (LMP Unknown)   ----------------      I spent 30 " minutes with this patient today. All changes were made via collaborative practice agreement with Roderick Benson CNP    A summary of these recommendations was sent via [employer].    Kathe Moody, PharmD, BCACP   Medication Therapy Management Pharmacist   [facility]  [phone]     Telemedicine Visit Details  The patient's medications can be safely assessed via a telemedicine encounter.  Type of service:  Telephone visit  Originating Location (pt. Location): Home    Distant Location (provider location):  On-site  Start Time: 1:09 PM  End Time: 1:36 PM     Medication Therapy Recommendations  Obesity (BMI 30.0-34.9)   1 Current Medication: COMPOUNDED NON-CONTROLLED SUBSTANCE (CMPD RX) - PHARMACY TO MIX COMPOUNDED MEDICATION   Current Medication Sig: Semaglutide: inject 1 mg subcutaneously every 7 days   Rationale: More cost-effective medication available - Cost - Adherence   Recommendation: Change Medication - Zepbound 2.5 MG/0.5ML Soaj   Status: Accepted per CPA   Identified Date: 3/4/2025 Completed Date: 3/4/2025

## 2025-03-04 NOTE — PATIENT INSTRUCTIONS
Recommendations from today's MTM visit:                                                         Restart semaglutide at 0.25mg every 7 days x2 weeks, then increase to 0.5mg.  Typically we increase the dose by 0.25mg every 4 weeks. You can increase after 2 weeks if you are doing OK with side effects and not having stomach pain.     Pending insurance coverage:  Finish semaglutide and start Zepbound 2.5mg every 7 days    Information about weight loss medicine options below:    GLP-1 Agonist Medications  Examples of this medication include Victoza, Ozempic etc. The medication chosen will depend on insurance coverage, and can be changed to the medication that is covered under your plan. These medications work the same, in that they can help you lose weight and control your blood sugar. One of the ways it works is by slowing down the rate that food leaves your stomach. You feel rivera and will eat less.  It also helps regulate hormones that can help improve your blood sugars.    Types of GLP-1 agonist medications include:    Victoza (liraglutide): Typical starting dose is 0.6 mg for a week, then 1.2 mg for a week, and then 1.8 mg thereafter. This medication is given daily. Pen needles need to be ordered also.   Saxenda (liraglutide): This contains the same active ingredient in Victoza, except FDA approved for weight loss specifically. Doses increase up to 3 mg daily.  Pen needles need to be ordered also.  Ozempic (semaglutide): Starting dose is 0.25 mg once weekly for 4 weeks, and then increase to 0.5 mg weekly. If after 4 weeks of being on 0.5 mg weekly dosing and still wanting additional weight loss and/or blood sugar benefits, check with your doctor to see if they want to increase the dose to 1 mg weekly. Pen needles come in the Ozempic pen box.  Wegovy (semaglutide): This contains the same active ingredient in Ozempic, except FDA approved for weight loss specifically. Doses increase up to 2.4 mg once weekly. Each pen  contains one dose and needle is within pen.   Trulicity (dulaglutide): Starting dose is 0.75 mg once weekly.  If after 1-3 months of being on 0.75 mg weekly and still wanting additional weight loss and/or blood sugar benefits, check with your doctor to see if they want to increase the dose to 1.5 mg weekly. Higher dosing is also available thereafter. Each pen contains one dose and needle is within pen.   Mounjaro (tirzepatide): This medication has a dual-action as GLP-1 agonist and gastric inhibitory polypeptide.  Starting dose is 2.5mg once weekly x4 weeks, then increase to 5mg once weekly.  The dose can be further increased as needed for weight loss.   Zepbound (tirzepatide): This contains the same active ingredient as Mounjaro, except FDA approved for weight loss specifically.      QSYMIA (phentermine and topiramate)  Another options is starting Qsymia (or one of the medications in Qsymia individually). This is a specific obesity medication and is a combination of sustained-release Phentermine and Topiramate. Qsymia is taken once daily in the morning.      For some of our patients, the pills work right away. They feel and think quite differently about food. Other patients don't feel much of a change but find in fact they have lost weight! Like all weight loss medications, Qsymia works best when you help it work.  This means:    1) Have less tempting high calorie (fattening) food around the house or office    2) Have lower calorie food (fruits, vegetables,low fat meats and dairy) for snacks    3) Eat out only one time or less each week.   4) Eat your meals at a table with the TV or computer off.    Side-effects (generally well tolerated because it is a sustained release medication)    Topiramate:   -Tingling in hands,feet, or face (usually not very troublesome)   -Mental confusion and word finding trouble (about 10% of patients have this)     -Feeling sleepy or a bit dopey- this goes away very soon after  starting      Phentermine:    -Feelings of racing pulse or rapid heart beat   -Increased anxiety/jitteriness   -Insomnia   -Dry mouth, constipation    -Some people can get an elevated blood pressure. Please have your blood pressure rechecked 1-2 weeks after starting Qsymia and report results back to the clinic via [employer].    One of the dangers of topiramate is the possibility of birth defects--if you get pregnant when you are on it, there is the risk that your baby will be born with a cleft lip or palate.  If you are on topiramate and of child bearing age, you need to be on a reliable form of birth control or refrain from sexual intercourse.     CONTRAVE (bupropion and naltrexone)  Another options is starting Contrave (or just the naltrexone component). Contrave is specifically prescribed for obesity. It is a combination of two medications, Naltrexone and Bupropione (Wellbutrin). The Bupropion helps lessen appetite and the Naltrexone works by blocking certain receptors in the brain and curbing cravings.      For some of our patients the medication works right away. Other patients don't feel much of a change but find they've lost weight. Like all weight loss medications, Contrave works best when you help it work. This means:  1. Having less tempting high calorie (fattening) food around the house or office. (For people with strong cravings this is very important.)   2. Staying away from situations or people that may trigger your cravings .   3. Eating out only one time or less each week.  4. Eating your meals at a table with the TV or computer off.    WARNING: This medication blocks the action of opioid type pain medications.     FYI- If you are planning on having an elective surgery, you should start titrating yourself off Contrave 4 weeks prior to surgery. This would entail decreasing the same way you started the medication, by taking one tablet less per week for 4 weeks, until you are able to stop the medication.  "If you need to stop it quicker, you can take 1 tablet in the morning and 1 tablet in the evening for 2 weeks, and then stop the medication. Please don't hesitate to call if you have any questions regarding this.    Side-effects: The most common side effect include: nausea; constipation; headache; vomiting; dizziness; diarrhea; trouble sleeping; and dry mouth.      Follow-up: 2 months    It was great speaking with you today.  I value your experience and would be very thankful for your time in providing feedback in our clinic survey. In the next few days, you may receive an email or text message from [facility] [employer] with a link to a survey related to your  clinical pharmacist.\"     To schedule another MTM appointment, please call the clinic directly or you may call the MTM scheduling line at [phone] or toll-free at [phone].     My Clinical Pharmacist's contact information:                                                      Please feel free to contact me with any questions or concerns you have.      Kathe Moody, PharmD, BCACP   Medication Therapy Management Pharmacist   [facility]  [phone]    "

## 2025-03-21 DIAGNOSIS — G47.33 OSA (OBSTRUCTIVE SLEEP APNEA): ICD-10-CM

## 2025-03-21 DIAGNOSIS — E66.811 OBESITY (BMI 30.0-34.9): ICD-10-CM

## 2025-03-21 DIAGNOSIS — E78.5 HYPERLIPIDEMIA LDL GOAL <100: ICD-10-CM

## 2025-03-21 DIAGNOSIS — R73.03 PREDIABETES: ICD-10-CM

## 2025-03-21 DIAGNOSIS — I10 BENIGN ESSENTIAL HYPERTENSION: ICD-10-CM

## 2025-03-25 DIAGNOSIS — G47.33 OSA (OBSTRUCTIVE SLEEP APNEA): ICD-10-CM

## 2025-03-25 DIAGNOSIS — R73.03 PREDIABETES: ICD-10-CM

## 2025-03-25 DIAGNOSIS — E78.5 HYPERLIPIDEMIA LDL GOAL <100: ICD-10-CM

## 2025-03-25 DIAGNOSIS — I10 BENIGN ESSENTIAL HYPERTENSION: ICD-10-CM

## 2025-03-25 DIAGNOSIS — E66.811 OBESITY (BMI 30.0-34.9): ICD-10-CM

## 2025-03-25 NOTE — TELEPHONE ENCOUNTER
Pharmacy Message Regarding ZEPBOUND 2.5 MG/0.5ML prefilled pen  Pharmacy Message:  pt was getting compounded semaglutide from [facility] but now needs to use commercial med. So please send new rx     There may be an issue with this prescription: Reason : Contraindicated

## 2025-03-26 RX ORDER — TIRZEPATIDE 2.5 MG/.5ML
2.5 INJECTION, SOLUTION SUBCUTANEOUS
Qty: 2 ML | Refills: 0 | Status: SHIPPED | OUTPATIENT
Start: 2025-03-26

## 2025-04-14 SDOH — HEALTH STABILITY: PHYSICAL HEALTH: ON AVERAGE, HOW MANY DAYS PER WEEK DO YOU ENGAGE IN MODERATE TO STRENUOUS EXERCISE (LIKE A BRISK WALK)?: 4 DAYS

## 2025-04-14 SDOH — HEALTH STABILITY: PHYSICAL HEALTH: ON AVERAGE, HOW MANY MINUTES DO YOU ENGAGE IN EXERCISE AT THIS LEVEL?: 20 MIN

## 2025-04-14 ASSESSMENT — SOCIAL DETERMINANTS OF HEALTH (SDOH): HOW OFTEN DO YOU GET TOGETHER WITH FRIENDS OR RELATIVES?: THREE TIMES A WEEK

## 2025-04-16 ENCOUNTER — OFFICE VISIT (OUTPATIENT)
Dept: FAMILY MEDICINE | Facility: CLINIC | Age: 70
End: 2025-04-16
Payer: COMMERCIAL

## 2025-04-16 VITALS
HEIGHT: 60 IN | WEIGHT: 149 LBS | BODY MASS INDEX: 29.25 KG/M2 | DIASTOLIC BLOOD PRESSURE: 58 MMHG | TEMPERATURE: 98.4 F | OXYGEN SATURATION: 95 % | RESPIRATION RATE: 16 BRPM | SYSTOLIC BLOOD PRESSURE: 112 MMHG | HEART RATE: 73 BPM

## 2025-04-16 DIAGNOSIS — D33.3 VESTIBULAR SCHWANNOMA (H): ICD-10-CM

## 2025-04-16 DIAGNOSIS — Z00.00 ENCOUNTER FOR MEDICARE ANNUAL WELLNESS EXAM: Primary | ICD-10-CM

## 2025-04-16 DIAGNOSIS — G47.33 OSA (OBSTRUCTIVE SLEEP APNEA): Primary | ICD-10-CM

## 2025-04-16 DIAGNOSIS — F17.200 NICOTINE DEPENDENCE, UNCOMPLICATED, UNSPECIFIED NICOTINE PRODUCT TYPE: ICD-10-CM

## 2025-04-16 DIAGNOSIS — R73.03 PREDIABETES: ICD-10-CM

## 2025-04-16 DIAGNOSIS — R53.83 OTHER FATIGUE: ICD-10-CM

## 2025-04-16 DIAGNOSIS — J43.2 CENTRILOBULAR EMPHYSEMA (H): ICD-10-CM

## 2025-04-16 DIAGNOSIS — I10 BENIGN ESSENTIAL HYPERTENSION: ICD-10-CM

## 2025-04-16 DIAGNOSIS — Z87.891 HISTORY OF TOBACCO USE, PRESENTING HAZARDS TO HEALTH: ICD-10-CM

## 2025-04-16 DIAGNOSIS — E11.9 TYPE 2 DIABETES MELLITUS WITHOUT COMPLICATION, WITHOUT LONG-TERM CURRENT USE OF INSULIN (H): ICD-10-CM

## 2025-04-16 DIAGNOSIS — Z13.6 SCREENING FOR CARDIOVASCULAR CONDITION: ICD-10-CM

## 2025-04-16 DIAGNOSIS — E11.9 CONTROLLED TYPE 2 DIABETES MELLITUS WITHOUT COMPLICATION, WITHOUT LONG-TERM CURRENT USE OF INSULIN (H): ICD-10-CM

## 2025-04-16 DIAGNOSIS — E78.5 HYPERLIPIDEMIA LDL GOAL <100: ICD-10-CM

## 2025-04-16 LAB
ANION GAP SERPL CALCULATED.3IONS-SCNC: 13 MMOL/L (ref 7–15)
BASOPHILS # BLD AUTO: 0 10E3/UL (ref 0–0.2)
BASOPHILS NFR BLD AUTO: 0 %
BUN SERPL-MCNC: 15.5 MG/DL (ref 8–23)
CALCIUM SERPL-MCNC: 10.7 MG/DL (ref 8.8–10.4)
CHLORIDE SERPL-SCNC: 104 MMOL/L (ref 98–107)
CHOLEST SERPL-MCNC: 223 MG/DL
CREAT SERPL-MCNC: 0.72 MG/DL (ref 0.51–0.95)
EGFRCR SERPLBLD CKD-EPI 2021: 90 ML/MIN/1.73M2
EOSINOPHIL # BLD AUTO: 0.1 10E3/UL (ref 0–0.7)
EOSINOPHIL NFR BLD AUTO: 1 %
ERYTHROCYTE [DISTWIDTH] IN BLOOD BY AUTOMATED COUNT: 13.2 % (ref 10–15)
EST. AVERAGE GLUCOSE BLD GHB EST-MCNC: 117 MG/DL
FASTING STATUS PATIENT QL REPORTED: NO
FASTING STATUS PATIENT QL REPORTED: NO
GLUCOSE SERPL-MCNC: 94 MG/DL (ref 70–99)
HBA1C MFR BLD: 5.7 % (ref 0–5.6)
HCO3 SERPL-SCNC: 27 MMOL/L (ref 22–29)
HCT VFR BLD AUTO: 48.8 % (ref 35–47)
HDLC SERPL-MCNC: 40 MG/DL
HGB BLD-MCNC: 15.4 G/DL (ref 11.7–15.7)
IMM GRANULOCYTES # BLD: 0 10E3/UL
IMM GRANULOCYTES NFR BLD: 0 %
LDLC SERPL CALC-MCNC: 136 MG/DL
LYMPHOCYTES # BLD AUTO: 1.7 10E3/UL (ref 0.8–5.3)
LYMPHOCYTES NFR BLD AUTO: 26 %
MCH RBC QN AUTO: 27.5 PG (ref 26.5–33)
MCHC RBC AUTO-ENTMCNC: 31.6 G/DL (ref 31.5–36.5)
MCV RBC AUTO: 87 FL (ref 78–100)
MONOCYTES # BLD AUTO: 0.5 10E3/UL (ref 0–1.3)
MONOCYTES NFR BLD AUTO: 7 %
NEUTROPHILS # BLD AUTO: 4.3 10E3/UL (ref 1.6–8.3)
NEUTROPHILS NFR BLD AUTO: 65 %
NONHDLC SERPL-MCNC: 183 MG/DL
PLATELET # BLD AUTO: 215 10E3/UL (ref 150–450)
POTASSIUM SERPL-SCNC: 4.3 MMOL/L (ref 3.4–5.3)
RBC # BLD AUTO: 5.6 10E6/UL (ref 3.8–5.2)
SODIUM SERPL-SCNC: 144 MMOL/L (ref 135–145)
TRIGL SERPL-MCNC: 235 MG/DL
TSH SERPL DL<=0.005 MIU/L-ACNC: 2.13 UIU/ML (ref 0.3–4.2)
WBC # BLD AUTO: 6.6 10E3/UL (ref 4–11)

## 2025-04-16 PROCEDURE — 84443 ASSAY THYROID STIM HORMONE: CPT

## 2025-04-16 PROCEDURE — 80061 LIPID PANEL: CPT

## 2025-04-16 PROCEDURE — 36415 COLL VENOUS BLD VENIPUNCTURE: CPT

## 2025-04-16 PROCEDURE — 80048 BASIC METABOLIC PNL TOTAL CA: CPT

## 2025-04-16 PROCEDURE — 85025 COMPLETE CBC W/AUTO DIFF WBC: CPT

## 2025-04-16 PROCEDURE — 83036 HEMOGLOBIN GLYCOSYLATED A1C: CPT

## 2025-04-16 RX ORDER — BUPROPION HYDROCHLORIDE 150 MG/1
150 TABLET, FILM COATED, EXTENDED RELEASE ORAL 2 TIMES DAILY
Qty: 60 TABLET | Refills: 2 | Status: SHIPPED | OUTPATIENT
Start: 2025-05-16

## 2025-04-16 RX ORDER — NICOTINE 21 MG/24HR
1 PATCH, TRANSDERMAL 24 HOURS TRANSDERMAL EVERY 24 HOURS
Qty: 42 PATCH | Refills: 0 | Status: SHIPPED | OUTPATIENT
Start: 2025-04-16 | End: 2025-05-28

## 2025-04-16 RX ORDER — LISINOPRIL 20 MG/1
20 TABLET ORAL DAILY
Qty: 90 TABLET | Refills: 3 | Status: SHIPPED | OUTPATIENT
Start: 2025-04-16

## 2025-04-16 RX ORDER — AMLODIPINE BESYLATE 10 MG/1
10 TABLET ORAL DAILY
Qty: 90 TABLET | Refills: 3 | Status: SHIPPED | OUTPATIENT
Start: 2025-04-16

## 2025-04-16 RX ORDER — BUPROPION HYDROCHLORIDE 150 MG/1
TABLET, FILM COATED, EXTENDED RELEASE ORAL
Qty: 57 TABLET | Refills: 0 | Status: SHIPPED | OUTPATIENT
Start: 2025-04-16 | End: 2025-05-16

## 2025-04-16 RX ORDER — NICOTINE 21 MG/24HR
1 PATCH, TRANSDERMAL 24 HOURS TRANSDERMAL EVERY 24 HOURS
Qty: 14 PATCH | Refills: 0 | Status: SHIPPED | OUTPATIENT
Start: 2025-05-28 | End: 2025-06-11

## 2025-04-16 NOTE — PROGRESS NOTES
Preventive Care Visit  [facility]  Roderick Benson NP, Nurse Practitioner Primary Care  Apr 16, 2025      Assessment & Plan     Benign essential hypertension  - BASIC METABOLIC PANEL; Future  - lisinopril (ZESTRIL) 20 MG tablet; Take 1 tablet (20 mg) by mouth daily.  - amLODIPine (NORVASC) 10 MG tablet; Take 1 tablet (10 mg) by mouth daily.  - BASIC METABOLIC PANEL  Controlled    History of tobacco use, presenting hazards to health  Cessation options given to patient    Hyperlipidemia LDL goal <100  - Lipid panel reflex to direct LDL Non-fasting; Future  - Lipid panel reflex to direct LDL Non-fasting    Nicotine dependence, uncomplicated, unspecified nicotine product type  - [place] Quit Partner Referral; Future  - buPROPion (ZYBAN) 150 MG 12 hr tablet; Take 1 tablet (150 mg) by mouth every morning for 3 days, THEN 1 tablet (150 mg) 2 times daily for 27 days. Take 2nd dose no later than 4pm.  - buPROPion (ZYBAN) 150 MG 12 hr tablet; Take 1 tablet (150 mg) by mouth 2 times daily. After your quit date treatment generally continues 7-12 weeks. Take your afternoon dose no later than 4pm  - nicotine (NICODERM CQ) 21 MG/24HR 24 hr patch; Place 1 patch over 24 hours onto the skin every 24 hours.  - nicotine (NICODERM CQ) 14 MG/24HR 24 hr patch; Place 1 patch over 24 hours onto the skin every 24 hours for 14 days.  - nicotine (NICODERM CQ) 7 MG/24HR 24 hr patch; Place 1 patch over 24 hours onto the skin every 24 hours for 14 days.    Other fatigue  - TSH with free T4 reflex; Future  - CBC with platelets and differential; Future  - TSH with free T4 reflex  - CBC with platelets and differential    Encounter for Medicare annual wellness exam    Centrilobular emphysema (H)  Working on tobacco cessation. No shortness of breath    Controlled type 2 diabetes mellitus without complication, without long-term current use of insulin (H)  -Will order Mounjaro Rx    Vestibular schwannoma (H)  Baseline  "dizziness and tinnitus. Declines ENT referral today. No changes in symptoms    Patient has been advised of split billing requirements and indicates understanding: Yes        BMI  Estimated body mass index is 29.25 kg/m  as calculated from the following:    Height as of this encounter: 1.52 m (4' 11.84\").    Weight as of this encounter: 67.6 kg (149 lb).   Weight management plan: Discussed healthy diet and exercise guidelines    Counseling  Appropriate preventive services were addressed with this patient via screening, questionnaire, or discussion as appropriate for fall prevention, nutrition, physical activity, Tobacco-use cessation, social engagement, weight loss and cognition.  Checklist reviewing preventive services available has been given to the patient.  Reviewed patient's diet, addressing concerns and/or questions.   Discussed possible causes of fatigue. The patient was provided with written information regarding signs of hearing loss.         Jes Hope is a 69 year old, presenting for the following:    Wellness Visit    Exercises: Ankle, head turning exercises, walking, ankle strengthening. Arm lifting. Treadmill. 4-5 days on treadmill- walking.  Diet: milk, oranges, grapes, bananas, oatmeal. Vegetables-mostly cucumbers and green peppers, lettuce  Hamburger 1x/week, chicken, turkey sandwiches.     -Did semaglutide for several months, but is off it now. Zepbound.  Sleeping:  went through sleep apnea testing. Tried for 8-9 months for CPAP and wait for 2 hours and couldn't fall asleep with it. Conditioned to breathe through nose.    Has gone on a few road trips to [place]. Drove to [place] and back- trailer there. Went to [facility]. Has been babysitting niece's daughter.  Retired in October: trying to figure out what to do. Was a  at the [employer]  Single. No concern for STDs.  2 daughters- Harper (43)-lives in [place], Dana (40)-lives in area-nurse. Babysitting niece's 15 month old.     Dexa " scan: March 2022. Due in [id number]. History of osteopenia.  Colonoscopy: Due in 2027.  MA: planning on mammogram next year.        4/16/2025     3:27 PM   Additional Questions   Roomed by Nina AMAYA            4/14/2025   General Health   How would you rate your overall physical health? Good   Feel stress (tense, anxious, or unable to sleep) Only a little   (!) STRESS CONCERN      4/14/2025   Nutrition   Diet: Regular (no restrictions)         4/14/2025   Exercise   Days per week of moderate/strenous exercise 4 days   Average minutes spent exercising at this level 20 min         4/14/2025   Social Factors   Frequency of gathering with friends or relatives Three times a week   Worry food won't last until get money to buy more No   Food not last or not have enough money for food? No   Do you have housing? (Housing is defined as stable permanent housing and does not include staying ouside in a car, in a tent, in an abandoned building, in an overnight shelter, or couch-surfing.) Yes   Are you worried about losing your housing? No   Lack of transportation? No   Unable to get utilities (heat,electricity)? No         4/16/2025   Fall Risk   Gait Speed Test (Document in seconds) 5.59   Gait Speed Test Interpretation Greater than 5.01 seconds - ABNORMAL          4/14/2025   Activities of Daily Living- Home Safety   Needs help with the following daily activites None of the above   Safety concerns in the home None of the above         4/14/2025   Dental   Dentist two times every year? Yes         4/14/2025   Hearing Screening   Hearing concerns? - Has hearing aids (!) I FEEL THAT PEOPLE ARE MUMBLING OR NOT SPEAKING CLEARLY.    (!) I NEED TO ASK PEOPLE TO SPEAK UP OR REPEAT THEMSELVES.    (!) IT'S HARD TO FOLLOW A CONVERSATION IN A NOISY RESTAURANT OR CROWDED ROOM.    (!) TROUBLE UNDESTANDING A SPEAKER IN A PUBLIC MEETING OR Taoism SERVICE.    (!) TROUBLE FOLLOWING DIALOGUE IN THE THEATHER.    (!) TROUBLE  UNDERSTANDING SOFT OR WHISPERED SPEECH.       Multiple values from one day are sorted in reverse-chronological order         4/14/2025   Driving Risk Screening   Patient/family members have concerns about driving No         4/14/2025   General Alertness/Fatigue Screening   Have you been more tired than usual lately? (!) YES         4/14/2025   Urinary Incontinence Screening   Bothered by leaking urine in past 6 months No           4/14/2025   Substance Use   Alcohol more than 3/day or more than 7/wk No   Do you have a current opioid prescription? No   How severe/bad is pain from 1 to 10? 0/10 (No Pain)   Do you use any other substances recreationally? No     Social History     Tobacco Use    Smoking status: Every Day     Current packs/day: 0.50     Average packs/day: 0.5 packs/day for 25.0 years (12.5 ttl pk-yrs)     Types: Cigarettes    Smokeless tobacco: Never   Vaping Use    Vaping status: Never Used   Substance Use Topics    Alcohol use: Yes     Comment: occasional use- once every other week- 1-3 drinks (social)    Drug use: No           4/30/2024   LAST FHS-7 RESULTS   1st degree relative breast or ovarian cancer No   Any relative bilateral breast cancer No   Any male have breast cancer No   Any ONE woman have BOTH breast AND ovarian cancer No   Any woman with breast cancer before 50yrs No   2 or more relatives with breast AND/OR ovarian cancer No   2 or more relatives with breast AND/OR bowel cancer No        Mammogram Screening - Mammogram every 1-2 years updated in Health Maintenance based on mutual decision making      History of abnormal Pap smear: No - age 65 or older with pap indicated due to inadequate prior screening (3 consecutive negative cytology results, 2 consecutive negative cotesting results, or 2 consecutive negative HrHPV test results within 10 years, with the most recent test occurring within the recommended screening interval for the test used)       ASCVD Risk   The ASCVD Risk  score (Ammy FERNANDES, et al., 2019) failed to calculate for the following reasons:    Cannot find a previous HDL lab    Cannot find a previous total cholesterol lab    Fracture Risk Assessment Tool  Link to Frax Calculator  Use the information below to complete the Frax calculator  : 1955  Sex: female  Weight (kg): 67.6 kg (actual weight)  Height (cm): 152 cm  Previous Fragility Fracture:  No  History of parent with fractured hip:  No  Current Smoking:  Yes  Patient has been on glucocorticoids for more than 3 months (5mg/day or more): No  Rheumatoid Arthritis on Problem List:  No  Secondary Osteoporosis on Problem List:  No  Consumes 3 or more units of alcohol per day: No  Femoral Neck BMD (g/cm2)            Reviewed and updated as needed this visit by Provider                      Current providers sharing in care for this patient include:  Patient Care Team:  No Ref-Primary, Physician as PCP - Wilfrid Bowden MD as Referring Physician (Otolaryngology)  Roderick Benson NP as Assigned PCP  Gia Ortiz MD as Assigned Pulmonology Provider  Kathe Moody RPH as Pharmacist (Pharmacist)  Kathe Moody RPH as Assigned MT Pharmacist    The following health maintenance items are reviewed in Epic and correct as of today:  Health Maintenance   Topic Date Due    ANNUAL REVIEW OF HM ORDERS  Never done    COPD ACTION PLAN  Never done    LIPID  Never done    MEDICARE ANNUAL WELLNESS VISIT  2025    BMP  2025    LUNG CANCER SCREENING  2025    COVID-19 Vaccine ( season) 2025    NICOTINE/TOBACCO CESSATION COUNSELING Q 1 YR  2026    FALL RISK ASSESSMENT  2026    MAMMO SCREENING  2026    DIABETES SCREENING  2027    COLORECTAL CANCER SCREENING  10/10/2027    ADVANCE CARE PLANNING  2029    DTAP/TDAP/TD IMMUNIZATION (3 - Td or Tdap) 2033    DEXA  2037    SPIROMETRY  Completed    HEPATITIS C SCREENING  Completed  "   PHQ-2 (once per calendar year)  Completed    INFLUENZA VACCINE  Completed    Pneumococcal Vaccine: 50+ Years  Completed    ZOSTER IMMUNIZATION  Completed    RSV VACCINE  Completed    HPV IMMUNIZATION  Aged Out    MENINGITIS IMMUNIZATION  Aged Out          Objective    Exam  /58 (BP Location: Right arm, Patient Position: Sitting, Cuff Size: Adult Regular)   Pulse 73   Temp 98.4  F (36.9  C) (Temporal)   Resp 16   Ht 1.52 m (4' 11.84\")   Wt 67.6 kg (149 lb)   LMP  (LMP Unknown)   SpO2 95%   BMI 29.25 kg/m     Estimated body mass index is 29.25 kg/m  as calculated from the following:    Height as of this encounter: 1.52 m (4' 11.84\").    Weight as of this encounter: 67.6 kg (149 lb).    Physical Exam  GENERAL: alert and no distress  EYES: Eyes grossly normal to inspection, PERRL and conjunctivae and sclerae normal  HENT: ear canals and TM's normal, nose and mouth without ulcers or lesions  NECK: no adenopathy, no asymmetry, masses, or scars  RESP: lungs clear to auscultation - no rales, rhonchi or wheezes  CV: regular rate and rhythm, normal S1 S2, no S3 or S4, no murmur, click or rub, no peripheral edema  ABDOMEN: soft, nontender, no hepatosplenomegaly, no masses and bowel sounds normal  MS: no gross musculoskeletal defects noted, no edema  SKIN: no suspicious lesions or rashes  NEURO: Normal strength and tone, mentation intact and speech normal  PSYCH: mentation appears normal, affect normal/bright        4/16/2025   Mini Cog   Clock Draw Score 2 Normal   3 Item Recall 3 objects recalled   Mini Cog Total Score 5              Signed Electronically by: Roderick Benson NP    "

## 2025-04-16 NOTE — PATIENT INSTRUCTIONS
Nicotine Transdermal System   Habitrol, Nicoderm C-Q    Uses  For quitting smoking.    Instructions  DO NOT take this medicine by mouth.    Avoid placing the patch near the breast.    Remove the patch after 24 hours.    Keep the medicine at room temperature. Avoid heat and direct light.    This patch should not be cut.    Wash your hands before and after handling this medicine.    Remove old patch before applying new one. Change the location of the new patch.    If you have vivid dreams or trouble sleeping, you may remove the patch before going to sleep.    Ask your doctor or pharmacist about locations on your body where this patch can be used.    Remove the plastic liner that protects the sticky side of the patch before applying to the skin.    Be sure the area of skin is clean and dry before putting on a new patch.    Apply the patch to a clean, dry, hairless area.    Press the patch firmly for a few seconds to make sure it stays in place.    After removing the patch, fold it together and discard it out of reach of children and pets.    Please ask your doctor or pharmacist how you can safely dispose of used patches.    If the skin under the patch becomes irritated, remove the patch. Do not apply a new patch to the area until the skin feels better.    To avoid irritating your skin, use a different location for a new patch.    Apply the patch only to normal looking skin. Avoid areas of the skin that are red, have scrapes, or damaged.    If the patch falls off, apply a new a patch on a different location of the body.    Please tell your doctor and pharmacist about all the medicines you take. Include both prescription and over-the-counter medicines. Also tell them about any vitamins, herbal medicines, or anything else you take for your health.    If you need to stop this medicine, your doctor may wish to gradually reduce the dosage before stopping.    Do not use more than 1 patch at any one time.    Cautions  Tell  your doctor and pharmacist if you ever had an allergic reaction to a medicine. Symptoms of an allergic reaction can include trouble breathing, skin rash, itching, swelling, or severe dizziness.    Do not use the medication any more than instructed.    Avoid smoking while on this medicine. Smoking may increase your risk for stroke, heart attack, blood clots, high blood pressure, and other diseases of the heart and blood vessels.    Tell the doctor or pharmacist if you are pregnant, planning to be pregnant, or breastfeeding.    Ask your pharmacist if this medicine can interact with any of your other medicines. Be sure to tell them about all the medicines you take.    Please tell all your doctors and dentists that you are on this medicine before they provide care.    Side Effects  The following is a list of some common side effects from this medicine. Please speak with your doctor about what you should do if you experience these or other side effects.    skin irritation where medicine is applied    If you have any of the following side effects, you may be getting too much medicine. Please contact your doctor to let them know about these side effects.    diarrhea  dizziness  nausea  rapid heartbeat  vomiting    A few people may have an allergic reaction to this medicine. Symptoms can include difficulty breathing, skin rash, itching, swelling, or severe dizziness. If you notice any of these symptoms, seek medical help quickly.    Extra  Please speak with your doctor, nurse, or pharmacist if you have any questions about this medicine.      [email or web address]  IMPORTANT NOTE: This document tells you briefly how to take your medicine, but it does not tell you all there is to know about it. Your doctor or pharmacist may give you other documents about your medicine. Please talk to them if you have any questions. Always follow their advice. There is a more complete description of this medicine  available in English. Scan this code on your smartphone or tablet or use the web address below. You can also ask your pharmacist for a printout. If you have any questions, please ask your pharmacist.   2021 [employer].      9433-3854 [employer]. All rights reserved. This information is not intended as a substitute for professional medical care. Always follow your healthcare professional's instructions.

## 2025-04-17 DIAGNOSIS — E78.5 HYPERLIPIDEMIA LDL GOAL <100: Primary | ICD-10-CM

## 2025-04-17 RX ORDER — ATORVASTATIN CALCIUM 10 MG/1
10 TABLET, FILM COATED ORAL DAILY
Qty: 90 TABLET | Refills: 3 | Status: SHIPPED | OUTPATIENT
Start: 2025-04-17

## 2025-05-08 ENCOUNTER — TRANSFERRED RECORDS (OUTPATIENT)
Dept: MULTI SPECIALTY CLINIC | Facility: CLINIC | Age: 70
End: 2025-05-08

## 2025-05-08 LAB — RETINOPATHY: NORMAL

## 2025-06-12 ENCOUNTER — MYC MEDICAL ADVICE (OUTPATIENT)
Dept: FAMILY MEDICINE | Facility: CLINIC | Age: 70
End: 2025-06-12
Payer: COMMERCIAL

## 2025-06-12 DIAGNOSIS — Z12.2 ENCOUNTER FOR SCREENING FOR LUNG CANCER: Primary | ICD-10-CM

## 2025-06-12 DIAGNOSIS — Z12.31 ENCOUNTER FOR SCREENING MAMMOGRAM FOR BREAST CANCER: ICD-10-CM

## 2025-06-12 DIAGNOSIS — Z87.891 PERSONAL HISTORY OF NICOTINE DEPENDENCE: ICD-10-CM

## 2025-06-12 DIAGNOSIS — Z12.2 ENCOUNTER FOR SCREENING FOR MALIGNANT NEOPLASM OF RESPIRATORY ORGANS: ICD-10-CM

## 2025-06-17 ENCOUNTER — ANCILLARY PROCEDURE (OUTPATIENT)
Dept: MAMMOGRAPHY | Facility: CLINIC | Age: 70
End: 2025-06-17
Payer: COMMERCIAL

## 2025-06-17 DIAGNOSIS — Z12.31 ENCOUNTER FOR SCREENING MAMMOGRAM FOR BREAST CANCER: ICD-10-CM

## 2025-06-17 PROCEDURE — 77063 BREAST TOMOSYNTHESIS BI: CPT | Mod: TC | Performed by: RADIOLOGY

## 2025-06-17 PROCEDURE — 77067 SCR MAMMO BI INCL CAD: CPT | Mod: TC | Performed by: RADIOLOGY

## 2025-06-23 ENCOUNTER — PATIENT OUTREACH (OUTPATIENT)
Dept: FAMILY MEDICINE | Facility: CLINIC | Age: 70
End: 2025-06-23
Payer: COMMERCIAL

## 2025-06-23 NOTE — TELEPHONE ENCOUNTER
Patient Quality Outreach    Patient is due for the following:   Diabetes -  Eye Exam, Microalbumin, and Foot Exam      Topic Date Due    COVID-19 Vaccine (7 - 2024-25 season) 04/23/2025       Action(s) Taken:   Patient has upcoming appointment, these items will be addressed at that time.    Type of outreach:    Chart review performed, no outreach needed.    Questions for provider review:    None         Jailene Patterson  Chart routed to None.

## 2025-06-25 ENCOUNTER — OFFICE VISIT (OUTPATIENT)
Dept: INTERNAL MEDICINE | Facility: CLINIC | Age: 70
End: 2025-06-25
Payer: COMMERCIAL

## 2025-06-25 ENCOUNTER — HOSPITAL ENCOUNTER (OUTPATIENT)
Dept: CT IMAGING | Facility: CLINIC | Age: 70
Discharge: HOME | End: 2025-06-25
Payer: COMMERCIAL

## 2025-06-25 ENCOUNTER — TELEPHONE (OUTPATIENT)
Dept: INTERNAL MEDICINE | Facility: CLINIC | Age: 70
End: 2025-06-25

## 2025-06-25 VITALS
TEMPERATURE: 98.1 F | DIASTOLIC BLOOD PRESSURE: 57 MMHG | SYSTOLIC BLOOD PRESSURE: 91 MMHG | WEIGHT: 152.1 LBS | RESPIRATION RATE: 17 BRPM | HEIGHT: 59 IN | OXYGEN SATURATION: 92 % | HEART RATE: 73 BPM | BODY MASS INDEX: 30.66 KG/M2

## 2025-06-25 DIAGNOSIS — F17.200 NICOTINE DEPENDENCE, UNCOMPLICATED, UNSPECIFIED NICOTINE PRODUCT TYPE: ICD-10-CM

## 2025-06-25 DIAGNOSIS — J43.2 CENTRILOBULAR EMPHYSEMA (H): ICD-10-CM

## 2025-06-25 DIAGNOSIS — J20.9 ACUTE BRONCHITIS, UNSPECIFIED ORGANISM: Primary | ICD-10-CM

## 2025-06-25 DIAGNOSIS — E11.9 CONTROLLED TYPE 2 DIABETES MELLITUS WITHOUT COMPLICATION, WITHOUT LONG-TERM CURRENT USE OF INSULIN (H): ICD-10-CM

## 2025-06-25 DIAGNOSIS — Z87.891 PERSONAL HISTORY OF NICOTINE DEPENDENCE: ICD-10-CM

## 2025-06-25 PROCEDURE — G2211 COMPLEX E/M VISIT ADD ON: HCPCS | Performed by: INTERNAL MEDICINE

## 2025-06-25 PROCEDURE — 1125F AMNT PAIN NOTED PAIN PRSNT: CPT | Performed by: INTERNAL MEDICINE

## 2025-06-25 PROCEDURE — 99214 OFFICE O/P EST MOD 30 MIN: CPT | Performed by: INTERNAL MEDICINE

## 2025-06-25 PROCEDURE — 71271 CT THORAX LUNG CANCER SCR C-: CPT | Mod: 26 | Performed by: RADIOLOGY

## 2025-06-25 PROCEDURE — 3078F DIAST BP <80 MM HG: CPT | Performed by: INTERNAL MEDICINE

## 2025-06-25 PROCEDURE — 71271 CT THORAX LUNG CANCER SCR C-: CPT

## 2025-06-25 PROCEDURE — 3074F SYST BP LT 130 MM HG: CPT | Performed by: INTERNAL MEDICINE

## 2025-06-25 RX ORDER — ALBUTEROL SULFATE 90 UG/1
1-2 INHALANT RESPIRATORY (INHALATION) EVERY 4 HOURS PRN
Qty: 18 G | Refills: 1 | Status: SHIPPED | OUTPATIENT
Start: 2025-06-25

## 2025-06-25 RX ORDER — PREDNISONE 10 MG/1
TABLET ORAL
Qty: 30 TABLET | Refills: 0 | Status: SHIPPED | OUTPATIENT
Start: 2025-06-25 | End: 2025-07-07

## 2025-06-25 RX ORDER — DOXYCYCLINE HYCLATE 100 MG
100 TABLET ORAL 2 TIMES DAILY
Qty: 14 TABLET | Refills: 0 | Status: SHIPPED | OUTPATIENT
Start: 2025-06-25 | End: 2025-07-02

## 2025-06-25 RX ORDER — INHALER, ASSIST DEVICES
SPACER (EA) MISCELLANEOUS
Qty: 1 EACH | Refills: 0 | Status: SHIPPED | OUTPATIENT
Start: 2025-06-25

## 2025-06-25 ASSESSMENT — PAIN SCALES - GENERAL: PAINLEVEL_OUTOF10: MILD PAIN (3)

## 2025-06-25 NOTE — PROGRESS NOTES
Assessment & Plan     1. Acute bronchitis, unspecified organism (Primary)  Patient with a history of smoking and some mild emphysema now with a couple of weeks of cough and respiratory symptoms.  She thinks this might be from bupropion so she stopped this.  She is smoking 2 cigarettes/day.  She has an upcoming chest CT this afternoon for lung cancer screening.  Recommend prednisone doxycycline and albuterol.  Discussed at length smoking cessation  - predniSONE (DELTASONE) 10 MG tablet; Take 4 tablets (40 mg) by mouth daily for 3 days, THEN 3 tablets (30 mg) daily for 3 days, THEN 2 tablets (20 mg) daily for 3 days, THEN 1 tablet (10 mg) daily for 3 days.  Dispense: 30 tablet; Refill: 0  - doxycycline hyclate (VIBRA-TABS) 100 MG tablet; Take 1 tablet (100 mg) by mouth 2 times daily for 7 days.  Dispense: 14 tablet; Refill: 0  - albuterol (PROAIR HFA/PROVENTIL HFA/VENTOLIN HFA) 108 (90 Base) MCG/ACT inhaler; Inhale 1-2 puffs into the lungs every 4 hours as needed for shortness of breath, wheezing or cough.  Dispense: 18 g; Refill: 1  - spacer (OPTICHAMBER MARIA TERESA) holding chamber; Use with albuterol inhaler  Dispense: 1 each; Refill: 0    2. Centrilobular emphysema (H)  - predniSONE (DELTASONE) 10 MG tablet; Take 4 tablets (40 mg) by mouth daily for 3 days, THEN 3 tablets (30 mg) daily for 3 days, THEN 2 tablets (20 mg) daily for 3 days, THEN 1 tablet (10 mg) daily for 3 days.  Dispense: 30 tablet; Refill: 0  - doxycycline hyclate (VIBRA-TABS) 100 MG tablet; Take 1 tablet (100 mg) by mouth 2 times daily for 7 days.  Dispense: 14 tablet; Refill: 0  - albuterol (PROAIR HFA/PROVENTIL HFA/VENTOLIN HFA) 108 (90 Base) MCG/ACT inhaler; Inhale 1-2 puffs into the lungs every 4 hours as needed for shortness of breath, wheezing or cough.  Dispense: 18 g; Refill: 1  - spacer (OPTICHAMBER MARIA TERESA) holding chamber; Use with albuterol inhaler  Dispense: 1 each; Refill: 0    3. Nicotine dependence, uncomplicated, unspecified  nicotine product type  - nicotine (NICODERM CQ) 7 MG/24HR 24 hr patch; Place 1 patch over 24 hours onto the skin every 24 hours.  Dispense: 14 patch; Refill: 0    4. Controlled type 2 diabetes mellitus without complication, without long-term current use of insulin (H)  Mild diabetes, blood pressure is a little bit low, just started Mounjaro she can hold amlodipine and check her blood pressures and have close follow-up with her primary provider.  She should tolerate prednisone as prescribed    The longitudinal plan of care for the diagnosis(es)/condition(s) as documented were addressed during this visit. Due to the added complexity in care, [facility] will continue to support Herminia in the subsequent management and with ongoing continuity of care.    Jes Hope is a 69 year old, presenting for the following health issues:  History of Present Illness (Was taking welbutrin for smoking cessation and then started having  wheezing,and cough do to side effects has stopped medicine. Also having congestion,fatigue and weight gain/Left inner thigh muscle pain, low back pain, no energy)      6/25/2025     8:37 AM   Additional Questions   Roomed by shannan jade     Via the Health Maintenance questionnaire, the patient has reported the following services have been completed -Eye Exam: [facility] 2025-05-08, this information has been sent to the abstraction team.  History of Present Illness       Reason for visit:  Chest congestion, wheezing, cough  Symptom onset:  1-2 weeks ago  Symptoms include:  Chest congestion, wheezing, cough, fatigue  Symptom intensity:  Moderate  Symptom progression:  Staying the same  Had these symptoms before:  No  What makes it worse:  Overwork  What makes it better:  Rest   She is taking medications regularly.            Objective    BP 91/57 (BP Location: Left arm, Patient Position: Sitting, Cuff Size: Adult Regular)   Pulse 73   Temp 98.1  F (36.7  C)   Resp 17   Ht 1.499 m (4'  "11\")   Wt 69 kg (152 lb 1.6 oz)   LMP  (LMP Unknown)   SpO2 92%   Breastfeeding No   BMI 30.72 kg/m    Body mass index is 30.72 kg/m .  Physical Exam   Diffuse expiratory wheezing          Signed Electronically by: Donald Walters MD    "

## 2025-06-26 ENCOUNTER — RESULTS FOLLOW-UP (OUTPATIENT)
Dept: FAMILY MEDICINE | Facility: CLINIC | Age: 70
End: 2025-06-26
Payer: COMMERCIAL

## 2025-06-26 ENCOUNTER — PATIENT OUTREACH (OUTPATIENT)
Dept: ONCOLOGY | Facility: CLINIC | Age: 70
End: 2025-06-26
Payer: COMMERCIAL

## 2025-06-26 ENCOUNTER — TELEPHONE (OUTPATIENT)
Dept: PULMONOLOGY | Facility: CLINIC | Age: 70
End: 2025-06-26
Payer: COMMERCIAL

## 2025-06-26 DIAGNOSIS — R91.8 MASS OF LEFT LUNG: Primary | ICD-10-CM

## 2025-06-26 DIAGNOSIS — R91.8 LUNG MASS: Primary | ICD-10-CM

## 2025-06-26 LAB — RADIOLOGIST FLAGS: ABNORMAL

## 2025-06-26 NOTE — PROGRESS NOTES
New IP (Interventional Pulmonology) referral rec'd.  Chart reviewed.        New Patient: Interventional Pulmonary (Lung nodule) Nurse Navigator Note    Referring provider: Will Ross [facility]    Referred to (specialty): Interventional Pulmonary (Lung nodule)    Requested provider (if applicable): n/a    Date Referral Received: 2025    Evaluation for:  lung mass on CT    Clinical History (per Nurse review of records provided):    **BOOK MARKED**    CT Low Dose Lung Cancer Screening     History:  Personal history of nicotine dependence Screening for lung  cancer, smoking.     Number of packs-year of smokin  Current or former smoker?: Current  If former, number of years since quit?: n/a     Comparison: 2024     Technique: Helical acquisition low dose CT chest. Images reviewed in  lung, soft tissue and bone windows.  DLP: 18 (mGy*cm)  CTDIvol: 0.49 mGy)     Findings: [All follow up of nodules are based on ACR guidelines for  lung cancer screening and measurements of each nodule size must be the  mean of the longest axial plane measurement by its perpendicular  measured to the nearest decimal and rounded up to the nearest whole  number. ]  .  Nodules: .     Bulky left hilar mass encasing the left lower lobe bronchus with  conglomerate adenopathy in the left hilum also narrowing of the left  upper lobe and lingular bronchus take off. There is postobstructive  atelectasis in the left lower lobe. Superior segment of the left lower  lobe bronchus is occluded. There is gas trapping in the left lung.     Emphysema: Trace centrilobular emphysema     Coronary artery calcium: trace     Additional findings: Heart size is normal without pericardial  effusion.  No pleural effusion or pneumothorax. Esophagus is  unremarkable. Borderline adenopathy in the left peritracheal region  and AP window. Bulky left hilar adenopathy.     No acute findings on  "limited evaluation of the upper abdomen.  Cholecystectomy.   Cyst in the upper pole of the right kidney.     Bones: No bony or soft tissue abnormality.                                                                      Impression:   1. ACR Assessment Category (2022):  Lung-RADS Category 4X. Very  Suspicious.       Recommendation:  Lung-RADS Category 4X. Very Suspicious.  Recommendation:  Chest CT with or without contrast, PET/CT and/or  tissue sampling depending on the *probability of malignancy and  comorbidities. PET/CT may be used when there is a ? 8 mm (? 268.1 mm3)  solid component. For new large nodules that develop on an  annual repeat screening CT, a1 month LDCT may be recommended to  address  potentially infectious or inflammatory conditions      2. Significant Incidental Finding(s):  Category S: No.     3. Any moderate or severe Emphysema or bronchial wall thickening or  mosaic attenuation? No     4. Avoidance of tobacco smoke is strongly advised. Please consider for  smoking cessation to [facility] Medication Therapy Management (MTM) if  clinically appropriate.        [Access Center: left hilar mass concerning for malignancy until proven  otherwise]     This report will be copied to the [facility] to ensure a  provider acknowledges the finding. Access Center is available Monday  through Friday 8am-3:30 pm.      Download the \"LungRADS 2022 Assessment Categories\" table at this site:     [email or web address]     I have personally reviewed the examination and initial interpretation  and I agree with the findings.     MACRINA OROZCO MD     Records Location: [facility] &     RECORDS NEEDED:  6/16/2020:  CT Chest imaging pushed to PACS from [facility]--thank you!!    Additional testing needed prior to consult: PFT's    "

## 2025-06-26 NOTE — TELEPHONE ENCOUNTER
Chart reviewed.  Has upcoming appointment 7/1/25 with oncology for evaluation.      Estrada Zamora, DO

## 2025-06-26 NOTE — TELEPHONE ENCOUNTER
I called patient   Reviewed CT  Urged her to see Pulm asap  Orders Placed This Encounter    CT Chest w Contrast     Standing Status:   Future     Expected Date:   6/26/2025     Expiration Date:   6/26/2026     Priority:   Routine [6]     To allow for insurance authorization, exam may be scheduled 7 days out to allow for this.  If this is not acceptable, choose one of the reasons accepted by payers for an expedited authorization; patients will be scheduled 5 days out.:   Acceptable to schedule 7 days for authorization     Administration of IV Contrast (contrast agent, dose and amount) will be tailored to this examination per the appropriate written protocol listed in the Protocol E Book, or by the interpreting provider. Can this order be altered by the radiologist?:   Yes    Adult Pulmonary Medicine  Referral     Standing Status:   Future     Expected Date:   6/26/2025     Expiration Date:   6/26/2026     Referral Priority:   Urgent: 3-5 Days     Referral Type:   Consultation     Requested Specialty:   Pulmonary Disease     Number of Visits Requested:   1

## 2025-06-26 NOTE — TELEPHONE ENCOUNTER
RECORDS STATUS - ALL OTHER DIAGNOSIS      RECORDS RECEIVED FROM: [facility]   NOTES STATUS DETAILS   OFFICE NOTE from referring provider Epic Dr. Will Ross   OFFICE NOTE from other specialist Epic 6/25/25: Dr. Donald Walters   MEDICATION LIST [facility]    LABS     ANYTHING RELATED TO DIAGNOSIS Epic Most recent 4/16/25   IMAGING (NEED IMAGES & REPORT)     CT SCANS PACS 6/25/25, 4/30/24: CT Chest Lung    [facility];  6/16/20: CT Chest Lung

## 2025-06-26 NOTE — TELEPHONE ENCOUNTER
"[facility] Radiology Lung Cancer Screening CT result notification:     LDCT/Lung Cancer Screening CT Exam date: 6/24/25  Radiologist Impression AND Recommendations:   Impression:   1. ACR Assessment Category (2022):  Lung-RADS Category 4X. Very  Suspicious.       Recommendation:  Lung-RADS Category 4X. Very Suspicious.  Recommendation:  Chest CT with or without contrast, PET/CT and/or  tissue sampling depending on the *probability of malignancy and  comorbidities. PET/CT may be used when there is a ? 8 mm (? 268.1 mm3)  solid component. For new large nodules that develop on an  annual repeat screening CT, a1 month LDCT may be recommended to  address  potentially infectious or inflammatory conditions      2. Significant Incidental Finding(s):  Category S: No.     3. Any moderate or severe Emphysema or bronchial wall thickening or  mosaic attenuation? No     4. Avoidance of tobacco smoke is strongly advised. Please consider for  smoking cessation to [facility] Medication Therapy Management (MTM) if  clinically appropriate.   Pertinent Findings:  Nodules: .     Bulky left hilar mass encasing the left lower lobe bronchus with  conglomerate adenopathy in the left hilum also narrowing of the left  upper lobe and lingular bronchus take off. There is postobstructive  atelectasis in the left lower lobe. Superior segment of the left lower  lobe bronchus is occluded. There is gas trapping in the left lung.     Ordering Provider: EDUAR Hood did receive the remaining radiology results from their PCP.          Lung RADS 4B/4X Protocol:  Results RN to notify Patient of results/recommendations, place referral to [facility] Lung Nodule clinic within 2 to 4 weeks.  Results RN place referral to [facility] Lung nodule program [9023 - Adult Oncology/Hematology  referral  with reason for referral \"Interventional pulmonology (Lung Nodule)\"] and Patient will be scheduled to see the Lung Nodule Specialist " within 2 to 4 weeks (Yes/No/NA): Yes    RN communicated the lung nodule finding to the patient (Yes/No):  no  The patient had the following questions: n/a  Correct letter sent as per [facility] Lung nodule protocol (Yes/No):  yes  Did Patient have any CT's of chest previous? (Yes/No/NA) n/a  If no comparisons used, inquire if patient has had any chest CT's in the past and if so, request priors.    Lena Lora, RN  RN Care Coordinator  Lung Nodule/Interventional Pulmonology Clinic  [facility]  Phone: [phone]

## 2025-07-01 ENCOUNTER — PRE VISIT (OUTPATIENT)
Dept: ONCOLOGY | Facility: CLINIC | Age: 70
End: 2025-07-01
Payer: COMMERCIAL

## 2025-07-01 ENCOUNTER — TELEPHONE (OUTPATIENT)
Dept: PULMONOLOGY | Facility: CLINIC | Age: 70
End: 2025-07-01
Payer: COMMERCIAL

## 2025-07-01 ENCOUNTER — VIRTUAL VISIT (OUTPATIENT)
Dept: ONCOLOGY | Facility: CLINIC | Age: 70
End: 2025-07-01
Attending: FAMILY MEDICINE
Payer: COMMERCIAL

## 2025-07-01 VITALS — BODY MASS INDEX: 29.06 KG/M2 | HEIGHT: 60 IN | WEIGHT: 148 LBS

## 2025-07-01 DIAGNOSIS — R06.2 WHEEZING: Primary | ICD-10-CM

## 2025-07-01 DIAGNOSIS — R91.8 MASS OF LEFT LUNG: ICD-10-CM

## 2025-07-01 DIAGNOSIS — F17.200 NICOTINE DEPENDENCE, UNCOMPLICATED, UNSPECIFIED NICOTINE PRODUCT TYPE: ICD-10-CM

## 2025-07-01 PROCEDURE — 98006 SYNCH AUDIO-VIDEO EST MOD 30: CPT | Performed by: INTERNAL MEDICINE

## 2025-07-01 PROCEDURE — 1126F AMNT PAIN NOTED NONE PRSNT: CPT | Mod: 95 | Performed by: INTERNAL MEDICINE

## 2025-07-01 RX ORDER — NICOTINE 21 MG/24HR
1 PATCH, TRANSDERMAL 24 HOURS TRANSDERMAL EVERY 24 HOURS
Qty: 42 PATCH | Refills: 0 | Status: SHIPPED | OUTPATIENT
Start: 2025-07-01 | End: 2025-08-12

## 2025-07-01 RX ORDER — NICOTINE 21 MG/24HR
1 PATCH, TRANSDERMAL 24 HOURS TRANSDERMAL EVERY 24 HOURS
Qty: 14 PATCH | Refills: 0 | Status: SHIPPED | OUTPATIENT
Start: 2025-08-12 | End: 2025-08-26

## 2025-07-01 RX ORDER — PREDNISONE 10 MG/1
10 TABLET ORAL DAILY
Qty: 5 TABLET | Refills: 0 | Status: SHIPPED | OUTPATIENT
Start: 2025-07-01 | End: 2025-07-06

## 2025-07-01 ASSESSMENT — PAIN SCALES - GENERAL: PAINLEVEL_OUTOF10: NO PAIN (0)

## 2025-07-01 NOTE — PROGRESS NOTES
[facility] [place]  [id number] [place] [address] [address]  [facility] [address]  Phone: [phone]  Fax: [phone]    [facility] Nodule Clinic Initial Visit    Patient:  Herminia Pastor, Date of birth 1955  Date of Visit:  07/01/2025  Referring Provider Will Ross      Assessment & Plan      Herminia is a 70 yo female seen today with her daughter Dana who is a nurse. Had been getting annual lung cancer screening but unfortunately this year she was a few months behind annual schedule with suspicious findings left hilum  Plan for bronchoscopy she is hoping for schedule date next week July 7,8,9 for rigid bronchoscopy, endobronchial ultrasound transbronchial needle aspiration and possible stent  Working on quitting smoking with patches - I am recommending she continue bupropion and NRT, add gum or lozenge        Medical Decision Making             Bushra Manjarrez MD             Reason for Visit  Herminia Pastor is a 69 year old female who is referred by Dr Ross for abnormal lung cancer screening   Pulmonary HPI    - she was experiencing wheezing and coughing and evaluated for acute bronchitis around the same time as her annual lung cancer screening. She has been experiencing wheezing and coughing since March, initially thought to be due to viral infection after exposure to a baby. Breathing seemed normal until last week when she experienced exertional dyspnea with mowing the lawn. Still taking prednisone and doxycycline and it has helped with chest pain.   She has been using a nicotine patch recently. She has successfully quit in the past with nicotine patch and bupropion and she is cutting back from 1/2 a pack per day to 3 per day.     Other active medical problems include:   - hearing loss right ear       ROS Pulmonary  Dyspnea: Yes, Cough: Yes, Chest pain: Yes, Wheezing: Yes, Sputum Production: No, Hemoptysis: No  A  complete ROS was otherwise negative except as noted in the HPI.  The patient was seen and examined by Bushra Manjarrez MD   Current Outpatient Medications   Medication Sig Dispense Refill    albuterol (PROAIR HFA/PROVENTIL HFA/VENTOLIN HFA) 108 (90 Base) MCG/ACT inhaler Inhale 1-2 puffs into the lungs every 4 hours as needed for shortness of breath, wheezing or cough. 18 g 1    amLODIPine (NORVASC) 10 MG tablet Take 1 tablet (10 mg) by mouth daily. 90 tablet 3    atorvastatin (LIPITOR) 10 MG tablet Take 1 tablet (10 mg) by mouth daily. (Patient not taking: Reported on 6/25/2025) 90 tablet 3    CALCIUM PO Take 300 mg by mouth daily      chlorpheniramine-pseudoePHEDrine (PSEUDO-GEST PLUS) 4-60 MG TABS Take 1 tablet by mouth every 6 hours as needed for allergies      doxycycline hyclate (VIBRA-TABS) 100 MG tablet Take 1 tablet (100 mg) by mouth 2 times daily for 7 days. 14 tablet 0    Glucosamine-Chondroitin (GLUCOSAMINE CHONDR COMPLEX PO) Take 2,000 mg by mouth daily      lisinopril (ZESTRIL) 20 MG tablet Take 1 tablet (20 mg) by mouth daily. 90 tablet 3    nicotine (NICODERM CQ) 7 MG/24HR 24 hr patch Place 1 patch over 24 hours onto the skin every 24 hours. 14 patch 0    omeprazole (PRILOSEC) 20 MG DR capsule Take 1 capsule (20 mg) by mouth every morning 90 capsule 3    predniSONE (DELTASONE) 10 MG tablet Take 4 tablets (40 mg) by mouth daily for 3 days, THEN 3 tablets (30 mg) daily for 3 days, THEN 2 tablets (20 mg) daily for 3 days, THEN 1 tablet (10 mg) daily for 3 days. 30 tablet 0    spacer (OPTICHAMBER MARIA TERESA) holding chamber Use with albuterol inhaler 1 each 0    tirzepatide (MOUNJARO) 2.5 MG/0.5ML SOAJ auto-injector pen Inject 0.5 mLs (2.5 mg) subcutaneously once a week. (Patient not taking: Reported on 7/1/2025) 6 mL 0     No current facility-administered medications for this visit.     Allergies   Allergen Reactions    Dust Mites     Grass     Mold     Ragweeds Other (See Comments)     Crustiness in  nose, headaches    Trees      Social History     Socioeconomic History    Marital status:      Spouse name: Not on file    Number of children: Not on file    Years of education: Not on file    Highest education level: Not on file   Occupational History    Not on file   Tobacco Use    Smoking status: Every Day     Current packs/day: 0.25     Average packs/day: 0.3 packs/day for 25.0 years (6.3 ttl pk-yrs)     Types: Cigarettes     Passive exposure: Current    Smokeless tobacco: Never   Vaping Use    Vaping status: Never Used   Substance and Sexual Activity    Alcohol use: Yes     Comment: occasional use- once every other week- 1-3 drinks (social)    Drug use: No    Sexual activity: Not Currently   Other Topics Concern    Parent/sibling w/ CABG, MI or angioplasty before 65F 55M? Not Asked   Social History Narrative    Not on file     Social Drivers of Health     Financial Resource Strain: Low Risk  (4/14/2025)    Financial Resource Strain     Within the past 12 months, have you or your family members you live with been unable to get utilities (heat, electricity) when it was really needed?: No   Food Insecurity: Low Risk  (4/14/2025)    Food Insecurity     Within the past 12 months, did you worry that your food would run out before you got money to buy more?: No     Within the past 12 months, did the food you bought just not last and you didn t have money to get more?: No   Transportation Needs: Low Risk  (4/14/2025)    Transportation Needs     Within the past 12 months, has lack of transportation kept you from medical appointments, getting your medicines, non-medical meetings or appointments, work, or from getting things that you need?: No   Physical Activity: Insufficiently Active (4/14/2025)    Exercise Vital Sign     Days of Exercise per Week: 4 days     Minutes of Exercise per Session: 20 min   Stress: No Stress Concern Present (4/14/2025)    [employer] - Occupational Stress  Questionnaire     Feeling of Stress : Only a little   Social Connections: Unknown (4/14/2025)    Social Connection and Isolation Panel [NHANES]     Frequency of Communication with Friends and Family: Not on file     Frequency of Social Gatherings with Friends and Family: Three times a week     Attends Jewish Services: Not on file     Active Member of Clubs or Organizations: Not on file     Attends Club or Organization Meetings: Not on file     Marital Status: Not on file   Interpersonal Safety: Low Risk  (4/16/2025)    Interpersonal Safety     Do you feel physically and emotionally safe where you currently live?: Yes     Within the past 12 months, have you been hit, slapped, kicked or otherwise physically hurt by someone?: No     Within the past 12 months, have you been humiliated or emotionally abused in other ways by your partner or ex-partner?: No   Housing Stability: Low Risk  (4/14/2025)    Housing Stability     Do you have housing? : Yes     Are you worried about losing your housing?: No     Past Medical History:   Diagnosis Date    Acoustic neuroma (H)     Allergic rhinitis 01/1980    Dry throat     Gastro-oesophageal reflux disease     Hearing loss     Heartburn     Hypertension     Itchy eyes     Night sweats     Ringing in ears     Sneezing     Snoring     Tinnitus     Visual loss     Weight gain      Past Surgical History:   Procedure Laterality Date    APPENDECTOMY      CHOLECYSTECTOMY      COLONOSCOPY N/A 10/10/2022    Procedure: COLONOSCOPY, FLEXIBLE, WITH LESION REMOVAL USING SNARE;  Surgeon: Penelope Garrett MD;  Location:  GI    CRANIOTOMY TRANSLABYRINTHINE, EXCISE ACOUSTIC NEUROMA (NEURO)  8/18/2014    Procedure: CRANIOTOMY TRANSLABYRINTHINE, EXCISE ACOUSTIC NEUROMA;  Surgeon: Wilfrid Garcia MD;  Location: [facility] OR    GYN SURGERY      uterine cystectomy    HERNIA REPAIR      REMOVE HARDWARE FACE Right 11/5/2015    Procedure: REMOVE HARDWARE FACE;  Surgeon: Wilfrid Garcia MD;  Location:  [facility] OR     Family History   Problem Relation Age of Onset    Hypertension Mother     Cerebrovascular Disease Mother     Alzheimer Disease Mother     Depression Mother     Psychotic Disorder Mother     Dementia Mother 60    Snoring Father     Sleep Apnea Brother     Diabetes Maternal Grandmother     Diabetes Paternal Grandmother     Thyroid Disease Maternal Uncle     Breast Cancer Cousin         Breast, bone, spine, liver    Thyroid Disease Cousin        Exam:   Ht 1.524 m (5')   Wt 67.1 kg (148 lb)   LMP  (LMP Unknown)   BMI 28.90 kg/m    GENERAL APPEARANCE: Well developed, well nourished, alert, and in no apparent distress.  EYES: PERRL, EOMI  PSYCH: mentation appears normal. and affect normal/bright  Results:  - My interpretation of the images relevant for this visit includes: CT chest 6/25/25 images reviewed and compared to prior lung cancer screening Jan 2024 new development of Bulky left hilar mass encasing the left lower lobe bronchus with conglomerate adenopathy in the left hilum also narrowing of the left upper lobe and lingular bronchus take off. There is postobstructive atelectasis in the left lower lobe.   - My interpretation of the PFT's relevant for this visit includes: Normal in 2024

## 2025-07-01 NOTE — PROGRESS NOTES
Virtual Visit Details    Type of service:  Video Visit   Joined the call at 7/1/2025, 9:32:32 am.  Left the call at 7/1/2025, 10:06:57 am.    Originating Location (pt. Location): Home    Distant Location (provider location):  On-site  Platform used for Video Visit: [employer]

## 2025-07-01 NOTE — LETTER
7/1/2025      Herminia Pastor  [address] [place] [address] [address]  [facility] [address]      Dear Colleague,    Thank you for referring your patient, Herminia Pastor, to the [facility]. Please see a copy of my visit note below.    Virtual Visit Details    Type of service:  Video Visit   Joined the call at 7/1/2025, 9:32:32 am.  Left the call at 7/1/2025, 10:06:57 am.    Originating Location (pt. Location): Home    Distant Location (provider location):  On-site  Platform used for Video Visit: [facility]  [address] [address]  [address]  [facility] [address]  Phone: [phone]  Fax: [phone]    [facility] Initial Visit    Patient:  Herminia Pastor, Date of birth 1955  Date of Visit:  07/01/2025  Referring Provider Will Ross      Assessment & Plan     Herminia is a 70 yo female seen today with her daughter Dana who is a nurse. Had been getting annual lung cancer screening but unfortunately this year she was a few months behind annual schedule with suspicious findings left hilum  Plan for bronchoscopy she is hoping for schedule date next week July 7,8,9 for rigid bronchoscopy, endobronchial ultrasound transbronchial needle aspiration and possible stent  Working on quitting smoking with patches - I am recommending she continue bupropion and NRT, add gum or lozenge        Medical Decision Making            Bushra Manjarrez MD             Reason for Visit  Herminia Pastor is a 69 year old female who is referred by Dr Ross for abnormal lung cancer screening   Pulmonary HPI    - she was experiencing wheezing and coughing and evaluated for acute bronchitis around the same time as her annual lung cancer screening. She has been experiencing wheezing and coughing since March, initially thought to be due to viral infection after exposure to a baby. Breathing  seemed normal until last week when she experienced exertional dyspnea with mowing the lawn. Still taking prednisone and doxycycline and it has helped with chest pain.   She has been using a nicotine patch recently. She has successfully quit in the past with nicotine patch and bupropion and she is cutting back from 1/2 a pack per day to 3 per day.     Other active medical problems include:   - hearing loss right ear       ROS Pulmonary  Dyspnea: Yes, Cough: Yes, Chest pain: Yes, Wheezing: Yes, Sputum Production: No, Hemoptysis: No  A complete ROS was otherwise negative except as noted in the HPI.  The patient was seen and examined by Bushra Manjarrez MD   Current Outpatient Medications   Medication Sig Dispense Refill     albuterol (PROAIR HFA/PROVENTIL HFA/VENTOLIN HFA) 108 (90 Base) MCG/ACT inhaler Inhale 1-2 puffs into the lungs every 4 hours as needed for shortness of breath, wheezing or cough. 18 g 1     amLODIPine (NORVASC) 10 MG tablet Take 1 tablet (10 mg) by mouth daily. 90 tablet 3     atorvastatin (LIPITOR) 10 MG tablet Take 1 tablet (10 mg) by mouth daily. (Patient not taking: Reported on 6/25/2025) 90 tablet 3     CALCIUM PO Take 300 mg by mouth daily       chlorpheniramine-pseudoePHEDrine (PSEUDO-GEST PLUS) 4-60 MG TABS Take 1 tablet by mouth every 6 hours as needed for allergies       doxycycline hyclate (VIBRA-TABS) 100 MG tablet Take 1 tablet (100 mg) by mouth 2 times daily for 7 days. 14 tablet 0     Glucosamine-Chondroitin (GLUCOSAMINE CHONDR COMPLEX PO) Take 2,000 mg by mouth daily       lisinopril (ZESTRIL) 20 MG tablet Take 1 tablet (20 mg) by mouth daily. 90 tablet 3     nicotine (NICODERM CQ) 7 MG/24HR 24 hr patch Place 1 patch over 24 hours onto the skin every 24 hours. 14 patch 0     omeprazole (PRILOSEC) 20 MG DR capsule Take 1 capsule (20 mg) by mouth every morning 90 capsule 3     predniSONE (DELTASONE) 10 MG tablet Take 4 tablets (40 mg) by mouth daily for 3 days, THEN 3 tablets (30  mg) daily for 3 days, THEN 2 tablets (20 mg) daily for 3 days, THEN 1 tablet (10 mg) daily for 3 days. 30 tablet 0     spacer (OPTICHAMBER MARIA TERESA) holding chamber Use with albuterol inhaler 1 each 0     tirzepatide (MOUNJARO) 2.5 MG/0.5ML SOAJ auto-injector pen Inject 0.5 mLs (2.5 mg) subcutaneously once a week. (Patient not taking: Reported on 7/1/2025) 6 mL 0     No current facility-administered medications for this visit.     Allergies   Allergen Reactions     Dust Mites      Grass      Mold      Ragweeds Other (See Comments)     Crustiness in nose, headaches     Trees      Social History     Socioeconomic History     Marital status:      Spouse name: Not on file     Number of children: Not on file     Years of education: Not on file     Highest education level: Not on file   Occupational History     Not on file   Tobacco Use     Smoking status: Every Day     Current packs/day: 0.25     Average packs/day: 0.3 packs/day for 25.0 years (6.3 ttl pk-yrs)     Types: Cigarettes     Passive exposure: Current     Smokeless tobacco: Never   Vaping Use     Vaping status: Never Used   Substance and Sexual Activity     Alcohol use: Yes     Comment: occasional use- once every other week- 1-3 drinks (social)     Drug use: No     Sexual activity: Not Currently   Other Topics Concern     Parent/sibling w/ CABG, MI or angioplasty before 65F 55M? Not Asked   Social History Narrative     Not on file     Social Drivers of Health     Financial Resource Strain: Low Risk  (4/14/2025)    Financial Resource Strain      Within the past 12 months, have you or your family members you live with been unable to get utilities (heat, electricity) when it was really needed?: No   Food Insecurity: Low Risk  (4/14/2025)    Food Insecurity      Within the past 12 months, did you worry that your food would run out before you got money to buy more?: No      Within the past 12 months, did the food you bought just not last and you didn t have  money to get more?: No   Transportation Needs: Low Risk  (4/14/2025)    Transportation Needs      Within the past 12 months, has lack of transportation kept you from medical appointments, getting your medicines, non-medical meetings or appointments, work, or from getting things that you need?: No   Physical Activity: Insufficiently Active (4/14/2025)    Exercise Vital Sign      Days of Exercise per Week: 4 days      Minutes of Exercise per Session: 20 min   Stress: No Stress Concern Present (4/14/2025)    [employer] - Occupational Stress Questionnaire      Feeling of Stress : Only a little   Social Connections: Unknown (4/14/2025)    Social Connection and Isolation Panel [NHANES]      Frequency of Communication with Friends and Family: Not on file      Frequency of Social Gatherings with Friends and Family: Three times a week      Attends Jewish Services: Not on file      Active Member of Clubs or Organizations: Not on file      Attends Club or Organization Meetings: Not on file      Marital Status: Not on file   Interpersonal Safety: Low Risk  (4/16/2025)    Interpersonal Safety      Do you feel physically and emotionally safe where you currently live?: Yes      Within the past 12 months, have you been hit, slapped, kicked or otherwise physically hurt by someone?: No      Within the past 12 months, have you been humiliated or emotionally abused in other ways by your partner or ex-partner?: No   Housing Stability: Low Risk  (4/14/2025)    Housing Stability      Do you have housing? : Yes      Are you worried about losing your housing?: No     Past Medical History:   Diagnosis Date     Acoustic neuroma (H)      Allergic rhinitis 01/1980     Dry throat      Gastro-oesophageal reflux disease      Hearing loss      Heartburn      Hypertension      Itchy eyes      Night sweats      Ringing in ears      Sneezing      Snoring      Tinnitus      Visual loss      Weight gain      Past  Surgical History:   Procedure Laterality Date     APPENDECTOMY       CHOLECYSTECTOMY       COLONOSCOPY N/A 10/10/2022    Procedure: COLONOSCOPY, FLEXIBLE, WITH LESION REMOVAL USING SNARE;  Surgeon: Penelope Garrett MD;  Location:  GI     CRANIOTOMY TRANSLABYRINTHINE, EXCISE ACOUSTIC NEUROMA (NEURO)  8/18/2014    Procedure: CRANIOTOMY TRANSLABYRINTHINE, EXCISE ACOUSTIC NEUROMA;  Surgeon: Wilfrid Garcia MD;  Location: [facility] OR     GYN SURGERY      uterine cystectomy     HERNIA REPAIR       REMOVE HARDWARE FACE Right 11/5/2015    Procedure: REMOVE HARDWARE FACE;  Surgeon: Wilfrid Garcia MD;  Location: [facility] OR     Family History   Problem Relation Age of Onset     Hypertension Mother      Cerebrovascular Disease Mother      Alzheimer Disease Mother      Depression Mother      Psychotic Disorder Mother      Dementia Mother 60     Snoring Father      Sleep Apnea Brother      Diabetes Maternal Grandmother      Diabetes Paternal Grandmother      Thyroid Disease Maternal Uncle      Breast Cancer Cousin         Breast, bone, spine, liver     Thyroid Disease Cousin        Exam:   Ht 1.524 m (5')   Wt 67.1 kg (148 lb)   LMP  (LMP Unknown)   BMI 28.90 kg/m    GENERAL APPEARANCE: Well developed, well nourished, alert, and in no apparent distress.  EYES: PERRL, EOMI  PSYCH: mentation appears normal. and affect normal/bright  Results:  - My interpretation of the images relevant for this visit includes: CT chest 6/25/25 images reviewed and compared to prior lung cancer screening Jan 2024 new development of Bulky left hilar mass encasing the left lower lobe bronchus with conglomerate adenopathy in the left hilum also narrowing of the left upper lobe and lingular bronchus take off. There is postobstructive atelectasis in the left lower lobe.   - My interpretation of the PFT's relevant for this visit includes: Normal in 2024    Again, thank you for allowing me to participate in the care of your patient.         Sincerely,        Bushra Manjarrez MD    Electronically signed

## 2025-07-01 NOTE — TELEPHONE ENCOUNTER
Spoke with patient at the request of Dr. Manjarrez. Writer relayed to patient that Dr. Manjarrez was called to assist in an emergency and that she will be back on for her virtual visit as soon as possible.  Caller states she understands and will stay connected on video visit.    Lena Lora, RN BSN  RN Care Coordinator  Interventional Pulmonology

## 2025-07-01 NOTE — PATIENT INSTRUCTIONS
I suspect this is a lung cancer and I recommend we do a bronchoscopy to get a confirmed diagnosis as well as do some work to open up the bronchial tubes and help your breathing. The procedure will be a rigid and flexible bronchoscopy at the [facility]    Keep taking the prednisone, I sent a few more days prescription to [facility]  Keep working on quitting smoking it's never too late to stop    We will likely plan on a PET scan after the bronchoscopy       Nicotine Transdermal System   Habitrol, Nicoderm C-Q    Uses  For quitting smoking.    Instructions  DO NOT take this medicine by mouth.    Avoid placing the patch near the breast.    Remove the patch after 24 hours.    Keep the medicine at room temperature. Avoid heat and direct light.    This patch should not be cut.    Wash your hands before and after handling this medicine.    Remove old patch before applying new one. Change the location of the new patch.    If you have vivid dreams or trouble sleeping, you may remove the patch before going to sleep.    Ask your doctor or pharmacist about locations on your body where this patch can be used.    Remove the plastic liner that protects the sticky side of the patch before applying to the skin.    Be sure the area of skin is clean and dry before putting on a new patch.    Apply the patch to a clean, dry, hairless area.    Press the patch firmly for a few seconds to make sure it stays in place.    After removing the patch, fold it together and discard it out of reach of children and pets.    Please ask your doctor or pharmacist how you can safely dispose of used patches.    If the skin under the patch becomes irritated, remove the patch. Do not apply a new patch to the area until the skin feels better.    To avoid irritating your skin, use a different location for a new patch.    Apply the patch only to normal looking skin. Avoid areas of the skin that are red, have scrapes, or damaged.    If the  patch falls off, apply a new a patch on a different location of the body.    Please tell your doctor and pharmacist about all the medicines you take. Include both prescription and over-the-counter medicines. Also tell them about any vitamins, herbal medicines, or anything else you take for your health.    If you need to stop this medicine, your doctor may wish to gradually reduce the dosage before stopping.    Do not use more than 1 patch at any one time.    Cautions  Tell your doctor and pharmacist if you ever had an allergic reaction to a medicine. Symptoms of an allergic reaction can include trouble breathing, skin rash, itching, swelling, or severe dizziness.    Do not use the medication any more than instructed.    Avoid smoking while on this medicine. Smoking may increase your risk for stroke, heart attack, blood clots, high blood pressure, and other diseases of the heart and blood vessels.    Tell the doctor or pharmacist if you are pregnant, planning to be pregnant, or breastfeeding.    Ask your pharmacist if this medicine can interact with any of your other medicines. Be sure to tell them about all the medicines you take.    Please tell all your doctors and dentists that you are on this medicine before they provide care.    Side Effects  The following is a list of some common side effects from this medicine. Please speak with your doctor about what you should do if you experience these or other side effects.    skin irritation where medicine is applied    If you have any of the following side effects, you may be getting too much medicine. Please contact your doctor to let them know about these side effects.    diarrhea  dizziness  nausea  rapid heartbeat  vomiting    A few people may have an allergic reaction to this medicine. Symptoms can include difficulty breathing, skin rash, itching, swelling, or severe dizziness. If you notice any of these symptoms, seek medical help quickly.    Extra  Please speak  with your doctor, nurse, or pharmacist if you have any questions about this medicine.      [email or web address]  IMPORTANT NOTE: This document tells you briefly how to take your medicine, but it does not tell you all there is to know about it. Your doctor or pharmacist may give you other documents about your medicine. Please talk to them if you have any questions. Always follow their advice. There is a more complete description of this medicine available in English. Scan this code on your smartphone or tablet or use the web address below. You can also ask your pharmacist for a printout. If you have any questions, please ask your pharmacist.   2021 [employer].      1699-6345 [employer]. All rights reserved. This information is not intended as a substitute for professional medical care. Always follow your healthcare professional's instructions.

## 2025-07-01 NOTE — LETTER
7/1/2025      Herminia Pastor  [address] [place] [address] [address]  [facility] [address]      Dear Colleague,    Thank you for referring your patient, Herminia Pastor, to the [facility]. Please see a copy of my visit note below.    Virtual Visit Details    Type of service:  Video Visit   Joined the call at 7/1/2025, 9:32:32 am.  Left the call at 7/1/2025, 10:06:57 am.    Originating Location (pt. Location): Home    Distant Location (provider location):  On-site  Platform used for Video Visit: [facility]  [address]  [facility] [address]  Phone: [phone]  Fax: [phone]    [facility] Initial Visit    Patient:  Herminia Pastor, Date of birth 1955  Date of Visit:  07/01/2025  Referring Provider Will Ross      Assessment & Plan     Herminia is a 68 yo female seen today with her daughter Dana who is a nurse. Had been getting annual lung cancer screening but unfortunately this year she was a few months behind annual schedule with suspicious findings left hilum  Plan for bronchoscopy she is hoping for schedule date next week July 7,8,9 for rigid bronchoscopy, endobronchial ultrasound transbronchial needle aspiration and possible stent  Working on quitting smoking with patches - I am recommending she continue bupropion and NRT, add gum or lozenge        Medical Decision Making            Bushra Manjarrez MD             Reason for Visit  Herminia Pastor is a 69 year old female who is referred by Dr Ross for abnormal lung cancer screening   Pulmonary HPI    - she was experiencing wheezing and coughing and evaluated for acute bronchitis around the same time as her annual lung cancer screening. She has been experiencing wheezing and coughing since March, initially thought to be due to viral infection after exposure to a baby. Breathing  seemed normal until last week when she experienced exertional dyspnea with mowing the lawn. Still taking prednisone and doxycycline and it has helped with chest pain.   She has been using a nicotine patch recently. She has successfully quit in the past with nicotine patch and bupropion and she is cutting back from 1/2 a pack per day to 3 per day.     Other active medical problems include:   - hearing loss right ear       ROS Pulmonary  Dyspnea: Yes, Cough: Yes, Chest pain: Yes, Wheezing: Yes, Sputum Production: No, Hemoptysis: No  A complete ROS was otherwise negative except as noted in the HPI.  The patient was seen and examined by Bushra Manjarrez MD   Current Outpatient Medications   Medication Sig Dispense Refill     albuterol (PROAIR HFA/PROVENTIL HFA/VENTOLIN HFA) 108 (90 Base) MCG/ACT inhaler Inhale 1-2 puffs into the lungs every 4 hours as needed for shortness of breath, wheezing or cough. 18 g 1     amLODIPine (NORVASC) 10 MG tablet Take 1 tablet (10 mg) by mouth daily. 90 tablet 3     atorvastatin (LIPITOR) 10 MG tablet Take 1 tablet (10 mg) by mouth daily. (Patient not taking: Reported on 6/25/2025) 90 tablet 3     CALCIUM PO Take 300 mg by mouth daily       chlorpheniramine-pseudoePHEDrine (PSEUDO-GEST PLUS) 4-60 MG TABS Take 1 tablet by mouth every 6 hours as needed for allergies       doxycycline hyclate (VIBRA-TABS) 100 MG tablet Take 1 tablet (100 mg) by mouth 2 times daily for 7 days. 14 tablet 0     Glucosamine-Chondroitin (GLUCOSAMINE CHONDR COMPLEX PO) Take 2,000 mg by mouth daily       lisinopril (ZESTRIL) 20 MG tablet Take 1 tablet (20 mg) by mouth daily. 90 tablet 3     nicotine (NICODERM CQ) 7 MG/24HR 24 hr patch Place 1 patch over 24 hours onto the skin every 24 hours. 14 patch 0     omeprazole (PRILOSEC) 20 MG DR capsule Take 1 capsule (20 mg) by mouth every morning 90 capsule 3     predniSONE (DELTASONE) 10 MG tablet Take 4 tablets (40 mg) by mouth daily for 3 days, THEN 3 tablets (30  mg) daily for 3 days, THEN 2 tablets (20 mg) daily for 3 days, THEN 1 tablet (10 mg) daily for 3 days. 30 tablet 0     spacer (OPTICHAMBER MARIA TERESA) holding chamber Use with albuterol inhaler 1 each 0     tirzepatide (MOUNJARO) 2.5 MG/0.5ML SOAJ auto-injector pen Inject 0.5 mLs (2.5 mg) subcutaneously once a week. (Patient not taking: Reported on 7/1/2025) 6 mL 0     No current facility-administered medications for this visit.     Allergies   Allergen Reactions     Dust Mites      Grass      Mold      Ragweeds Other (See Comments)     Crustiness in nose, headaches     Trees      Social History     Socioeconomic History     Marital status:      Spouse name: Not on file     Number of children: Not on file     Years of education: Not on file     Highest education level: Not on file   Occupational History     Not on file   Tobacco Use     Smoking status: Every Day     Current packs/day: 0.25     Average packs/day: 0.3 packs/day for 25.0 years (6.3 ttl pk-yrs)     Types: Cigarettes     Passive exposure: Current     Smokeless tobacco: Never   Vaping Use     Vaping status: Never Used   Substance and Sexual Activity     Alcohol use: Yes     Comment: occasional use- once every other week- 1-3 drinks (social)     Drug use: No     Sexual activity: Not Currently   Other Topics Concern     Parent/sibling w/ CABG, MI or angioplasty before 65F 55M? Not Asked   Social History Narrative     Not on file     Social Drivers of Health     Financial Resource Strain: Low Risk  (4/14/2025)    Financial Resource Strain      Within the past 12 months, have you or your family members you live with been unable to get utilities (heat, electricity) when it was really needed?: No   Food Insecurity: Low Risk  (4/14/2025)    Food Insecurity      Within the past 12 months, did you worry that your food would run out before you got money to buy more?: No      Within the past 12 months, did the food you bought just not last and you didn t have  money to get more?: No   Transportation Needs: Low Risk  (4/14/2025)    Transportation Needs      Within the past 12 months, has lack of transportation kept you from medical appointments, getting your medicines, non-medical meetings or appointments, work, or from getting things that you need?: No   Physical Activity: Insufficiently Active (4/14/2025)    Exercise Vital Sign      Days of Exercise per Week: 4 days      Minutes of Exercise per Session: 20 min   Stress: No Stress Concern Present (4/14/2025)    [employer] [place] [employer] - Occupational Stress Questionnaire      Feeling of Stress : Only a little   Social Connections: Unknown (4/14/2025)    Social Connection and Isolation Panel [NHANES]      Frequency of Communication with Friends and Family: Not on file      Frequency of Social Gatherings with Friends and Family: Three times a week      Attends [employer] Services: Not on file      Active Member of Clubs or Organizations: Not on file      Attends Club or Organization Meetings: Not on file      Marital Status: Not on file   Interpersonal Safety: Low Risk  (4/16/2025)    Interpersonal Safety      Do you feel physically and emotionally safe where you currently live?: Yes      Within the past 12 months, have you been hit, slapped, kicked or otherwise physically hurt by someone?: No      Within the past 12 months, have you been humiliated or emotionally abused in other ways by your partner or ex-partner?: No   Housing Stability: Low Risk  (4/14/2025)    Housing Stability      Do you have housing? : Yes      Are you worried about losing your housing?: No     Past Medical History:   Diagnosis Date     Acoustic neuroma (H)      Allergic rhinitis 01/1980     Dry throat      Gastro-oesophageal reflux disease      Hearing loss      Heartburn      Hypertension      Itchy eyes      Night sweats      Ringing in ears      Sneezing      Snoring      Tinnitus      Visual loss      Weight gain      Past  Surgical History:   Procedure Laterality Date     APPENDECTOMY       CHOLECYSTECTOMY       COLONOSCOPY N/A 10/10/2022    Procedure: COLONOSCOPY, FLEXIBLE, WITH LESION REMOVAL USING SNARE;  Surgeon: Penelope Garrett MD;  Location:  GI     CRANIOTOMY TRANSLABYRINTHINE, EXCISE ACOUSTIC NEUROMA (NEURO)  8/18/2014    Procedure: CRANIOTOMY TRANSLABYRINTHINE, EXCISE ACOUSTIC NEUROMA;  Surgeon: Wilfrid Garcia MD;  Location: [facility] OR     GYN SURGERY      uterine cystectomy     HERNIA REPAIR       REMOVE HARDWARE FACE Right 11/5/2015    Procedure: REMOVE HARDWARE FACE;  Surgeon: Wilfrid Garcia MD;  Location: [facility] OR     Family History   Problem Relation Age of Onset     Hypertension Mother      Cerebrovascular Disease Mother      Alzheimer Disease Mother      Depression Mother      Psychotic Disorder Mother      Dementia Mother 60     Snoring Father      Sleep Apnea Brother      Diabetes Maternal Grandmother      Diabetes Paternal Grandmother      Thyroid Disease Maternal Uncle      Breast Cancer Cousin         Breast, bone, spine, liver     Thyroid Disease Cousin        Exam:   Ht 1.524 m (5')   Wt 67.1 kg (148 lb)   LMP  (LMP Unknown)   BMI 28.90 kg/m    GENERAL APPEARANCE: Well developed, well nourished, alert, and in no apparent distress.  EYES: PERRL, EOMI  PSYCH: mentation appears normal. and affect normal/bright  Results:  - My interpretation of the images relevant for this visit includes: CT chest 6/25/25 images reviewed and compared to prior lung cancer screening Jan 2024 new development of Bulky left hilar mass encasing the left lower lobe bronchus with conglomerate adenopathy in the left hilum also narrowing of the left upper lobe and lingular bronchus take off. There is postobstructive atelectasis in the left lower lobe.   - My interpretation of the PFT's relevant for this visit includes: Normal in 2024    Again, thank you for allowing me to participate in the care of your patient.         Sincerely,        Bushra Manjarrez MD    Electronically signed

## 2025-07-01 NOTE — NURSING NOTE
Patient confirms medications and allergies are accurate via patients echeck in completion, and or denies any changes since last reviewed/verified.     Patient declined individual allergy and medication review   Current patient location: @ [facility]    Is the patient currently in the state of [place]? YES    Visit mode: VIDEO    If the visit is dropped, the patient can be reconnected by:VIDEO VISIT: Text to cell phone:   Telephone Information:   Mobile [phone]       Will anyone else be joining the visit? Daughter  (If patient encounters technical issues they should call [phone]    Are changes needed to the allergy or medication list? B12 Please add    Are refills needed on medications prescribed by this physician? NO    Rooming Documentation:  Questionnaire(s) not done per department protocol    Reason for visit: Consult    Tia Brooks VVF

## 2025-07-02 ENCOUNTER — HOSPITAL ENCOUNTER (EMERGENCY)
Facility: HOSPITAL | Age: 70
Discharge: HOME | End: 2025-07-02
Attending: STUDENT IN AN ORGANIZED HEALTH CARE EDUCATION/TRAINING PROGRAM
Payer: COMMERCIAL

## 2025-07-02 VITALS
SYSTOLIC BLOOD PRESSURE: 122 MMHG | OXYGEN SATURATION: 93 % | TEMPERATURE: 97.8 F | DIASTOLIC BLOOD PRESSURE: 67 MMHG | HEART RATE: 74 BPM | RESPIRATION RATE: 20 BRPM

## 2025-07-02 DIAGNOSIS — S30.860A TICK BITE OF LOWER BACK, INITIAL ENCOUNTER: ICD-10-CM
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
## 2025-07-02 PROCEDURE — G0463 HOSPITAL OUTPT CLINIC VISIT: HCPCS | Mod: 25 | Performed by: STUDENT IN AN ORGANIZED HEALTH CARE EDUCATION/TRAINING PROGRAM

## 2025-07-02 PROCEDURE — 99213 OFFICE O/P EST LOW 20 MIN: CPT | Performed by: STUDENT IN AN ORGANIZED HEALTH CARE EDUCATION/TRAINING PROGRAM

## 2025-07-02 PROCEDURE — 94640 AIRWAY INHALATION TREATMENT: CPT

## 2025-07-02 PROCEDURE — 250N000009 HC RX 250: Performed by: STUDENT IN AN ORGANIZED HEALTH CARE EDUCATION/TRAINING PROGRAM

## 2025-07-02 PROCEDURE — 999N000157 HC STATISTIC RCP TIME EA 10 MIN

## 2025-07-02 RX ORDER — IPRATROPIUM BROMIDE AND ALBUTEROL SULFATE 2.5; .5 MG/3ML; MG/3ML
3 SOLUTION RESPIRATORY (INHALATION) ONCE
Status: COMPLETED | OUTPATIENT
Start: 2025-07-02 | End: 2025-07-02

## 2025-07-02 RX ORDER — DOXYCYCLINE 100 MG/1
100 CAPSULE ORAL 2 TIMES DAILY
Qty: 28 CAPSULE | Refills: 0 | Status: SHIPPED | OUTPATIENT
Start: 2025-07-02 | End: 2025-07-16

## 2025-07-02 RX ORDER — DOXYCYCLINE 100 MG/1
100 TABLET ORAL DAILY
Qty: 14 TABLET | Refills: 0 | Status: SHIPPED | OUTPATIENT
Start: 2025-07-02 | End: 2025-07-02

## 2025-07-02 RX ADMIN — IPRATROPIUM BROMIDE AND ALBUTEROL SULFATE 3 ML: .5; 3 SOLUTION RESPIRATORY (INHALATION) at 16:01

## 2025-07-02 ASSESSMENT — COLUMBIA-SUICIDE SEVERITY RATING SCALE - C-SSRS
6. HAVE YOU EVER DONE ANYTHING, STARTED TO DO ANYTHING, OR PREPARED TO DO ANYTHING TO END YOUR LIFE?: NO
1. IN THE PAST MONTH, HAVE YOU WISHED YOU WERE DEAD OR WISHED YOU COULD GO TO SLEEP AND NOT WAKE UP?: NO
2. HAVE YOU ACTUALLY HAD ANY THOUGHTS OF KILLING YOURSELF IN THE PAST MONTH?: NO

## 2025-07-02 ASSESSMENT — ENCOUNTER SYMPTOMS
WOUND: 1
CHILLS: 0
SHORTNESS OF BREATH: 0
FATIGUE: 0
FEVER: 0

## 2025-07-02 ASSESSMENT — ACTIVITIES OF DAILY LIVING (ADL): ADLS_ACUITY_SCORE: 41

## 2025-07-02 NOTE — ED NOTES
Patient discharged to Home at this time. Patient given written and verbal discharge instructions regarding home care, follow-up, and medications. Patient verbalized understanding of all discharge instructions. Rest and hydration encouraged. Patient encouraged to return to the ED if they experience new, worsening, or concerning symptoms.     Patients RX for doxycycline sent to [facility] pharmacy.    Patient to follow-up with pulmonology as scheduled.

## 2025-07-02 NOTE — ED TRIAGE NOTES
Pt reports she had an itch on her lower back last night which turned out to be a tick. Tick no longer present, redness and swelling to the area.    Pt reports she is currently being treated for bronchitis and also has a new lung mass. Pt has audible wheezing.

## 2025-07-02 NOTE — DISCHARGE INSTRUCTIONS
As discussed we will extend your doxycycline for the tick bite.  This was sent to the [facility] pharmacy.  You may take this as prescribed.  Follow-up with pulmonology as scheduled, you may return for any new or worsening symptoms especially difficulty with your breathing.

## 2025-07-02 NOTE — ED PROVIDER NOTES
History     Chief Complaint   Patient presents with    Tick Bite     HPI  Herminia Pastor is a 69 year old female who has a history of hypertension, TOM, prediabetes, emphysema, type 2 diabetes, as well as recent finding of a bulky perihilar mass on the left.  Patient scheduled to follow-up for bronchoscopy with pulmonology later this month.  She presents today for concerns of a tick bite in her lower back.  She states she noticed yesterday evening when she scratched her back and a tick fell off.  Since then she has developed a red ring around the area.  She denies fevers, chills, nausea, vomiting.  She denies any increasing shortness of breath or difficulty with with breathing from her baseline.    Allergies:  Allergies   Allergen Reactions    Dust Mites     Grass     Mold     Ragweeds Other (See Comments)     Crustiness in nose, headaches    Trees        Problem List:    Patient Active Problem List    Diagnosis Date Noted    Centrilobular emphysema (H) 04/16/2025     Priority: Medium    Controlled type 2 diabetes mellitus without complication, without long-term current use of insulin (H) 04/16/2025     Priority: Medium    Benign essential hypertension 06/05/2024     Priority: Medium    Prediabetes 06/05/2024     Priority: Medium    Class 1 obesity due to excess calories with serious comorbidity and body mass index (BMI) of 32.0 to 32.9 in adult 06/05/2024     Priority: Medium    Nocturnal hypoxemia 05/03/2024     Priority: Medium    TOM (obstructive sleep apnea) 02/22/2024     Priority: Medium     HSAT 2/14/2024  Weight 174 lbs BMI 34.12  AHI 25.9, supine AHI 53.3  Time with saturation less than or equal to 88% was 380 minutes. The lowest oxygen saturation was 72%.       Vestibular schwannoma (H) 06/24/2014     Priority: Medium        Past Medical History:    Past Medical History:   Diagnosis Date    Acoustic neuroma (H)     Allergic rhinitis 01/1980    Dry throat     Gastro-oesophageal reflux disease     Hearing  loss     Heartburn     Hypertension     Itchy eyes     Night sweats     Ringing in ears     Sneezing     Snoring     Tinnitus     Visual loss     Weight gain        Past Surgical History:    Past Surgical History:   Procedure Laterality Date    APPENDECTOMY      CHOLECYSTECTOMY      COLONOSCOPY N/A 10/10/2022    Procedure: COLONOSCOPY, FLEXIBLE, WITH LESION REMOVAL USING SNARE;  Surgeon: Penelope Garrett MD;  Location:  GI    CRANIOTOMY TRANSLABYRINTHINE, EXCISE ACOUSTIC NEUROMA (NEURO)  8/18/2014    Procedure: CRANIOTOMY TRANSLABYRINTHINE, EXCISE ACOUSTIC NEUROMA;  Surgeon: Wilfrid Garcia MD;  Location: [facility] OR    GYN SURGERY      uterine cystectomy    HERNIA REPAIR      REMOVE HARDWARE FACE Right 11/5/2015    Procedure: REMOVE HARDWARE FACE;  Surgeon: Wilfrid Garcia MD;  Location: [facility] OR       Family History:    Family History   Problem Relation Age of Onset    Hypertension Mother     Cerebrovascular Disease Mother     Alzheimer Disease Mother     Depression Mother     Psychotic Disorder Mother     Dementia Mother 60    Snoring Father     Sleep Apnea Brother     Diabetes Maternal Grandmother     Diabetes Paternal Grandmother     Thyroid Disease Maternal Uncle     Breast Cancer Cousin         Breast, bone, spine, liver    Thyroid Disease Cousin        Social History:  Marital Status:   [4]  Social History     Tobacco Use    Smoking status: Every Day     Current packs/day: 0.25     Average packs/day: 0.3 packs/day for 25.0 years (6.3 ttl pk-yrs)     Types: Cigarettes     Passive exposure: Current    Smokeless tobacco: Never   Vaping Use    Vaping status: Never Used   Substance Use Topics    Alcohol use: Yes     Comment: occasional use- once every other week- 1-3 drinks (social)    Drug use: No        Medications:    doxycycline hyclate (VIBRAMYCIN) 100 MG capsule  albuterol (PROAIR HFA/PROVENTIL HFA/VENTOLIN HFA) 108 (90 Base) MCG/ACT inhaler  amLODIPine (NORVASC) 10 MG tablet  atorvastatin  (LIPITOR) 10 MG tablet  CALCIUM PO  chlorpheniramine-pseudoePHEDrine (PSEUDO-GEST PLUS) 4-60 MG TABS  Glucosamine-Chondroitin (GLUCOSAMINE CHONDR COMPLEX PO)  lisinopril (ZESTRIL) 20 MG tablet  [START ON 8/12/2025] nicotine (NICODERM CQ) 14 MG/24HR 24 hr patch  nicotine (NICODERM CQ) 21 MG/24HR 24 hr patch  [START ON 8/27/2025] nicotine (NICODERM CQ) 7 MG/24HR 24 hr patch  nicotine (NICODERM CQ) 7 MG/24HR 24 hr patch  nicotine (NICORETTE) 2 MG gum  omeprazole (PRILOSEC) 20 MG DR capsule  predniSONE (DELTASONE) 10 MG tablet  predniSONE (DELTASONE) 10 MG tablet  spacer (OPTICHAMBER MARIA TERESA) holding chamber  tirzepatide (MOUNJARO) 2.5 MG/0.5ML SOAJ auto-injector pen          Review of Systems   Constitutional:  Negative for chills, fatigue and fever.   Respiratory:  Negative for shortness of breath.    Skin:  Positive for wound.       Physical Exam   BP: 122/67  Pulse: 71  Temp: 97.8  F (36.6  C)  Resp: 20  SpO2: (!) 89 %      Physical Exam  Constitutional:       Appearance: Normal appearance. She is not ill-appearing.   Cardiovascular:      Rate and Rhythm: Normal rate.   Pulmonary:      Effort: Pulmonary effort is normal. No respiratory distress.      Breath sounds: No stridor. Wheezing present.   Skin:     General: Skin is warm and dry.      Coloration: Skin is not pale.      Findings: No erythema.   Neurological:      Mental Status: She is alert.         ED Course                      Critical Care time:  none     None         No results found for this or any previous visit (from the past 24 hours).    Medications   ipratropium - albuterol 0.5 mg/2.5 mg (3mg)/3 mL (DUONEB) neb solution 3 mL (3 mLs Nebulization $Given 7/2/25 2835)       Assessments & Plan (with Medical Decision Making)     I have reviewed the nursing notes.    I have reviewed the findings, diagnosis, plan and need for follow up with the patient.           Medical Decision Making  The patient's presentation was of straightforward complexity (a  clearly self-limited or minor problem).    The patient's evaluation involved:  history and exam without other MDM data elements    The patient's management necessitated moderate risk (prescription drug management including medications given in the ED).    69-year-old female presenting for concerns of possible tick bite concern for possible Lyme exposure.  She does have a bull's-eye type lesion on her left low back.  No exudative drainage.  Patient was recently on doxycycline and prednisone.  She had her prednisone extended for her breathing.  Given she was recently on Doxy will just extend this for an additional 2 weeks.  She is following up with pulmonology in regards to her perihilar mass.    Patient had some slight expiratory wheezing on exam as well as borderline SpO2 at 88%.  Given this we did do DuoNeb for the wheezing.  This did help with her breathing and her SpO2 did bump up.  We discussed following with pulmonology as scheduled, as well as strict return precautions.    New Prescriptions    DOXYCYCLINE HYCLATE (VIBRAMYCIN) 100 MG CAPSULE    Take 1 capsule (100 mg) by mouth 2 times daily for 14 days.       Final diagnoses:   Tick bite of lower back, initial encounter       7/2/2025   [facility]       Jose Antonio Peterson PA-C  07/02/25 [id number]

## 2025-07-03 RX ORDER — BUPROPION HYDROCHLORIDE 150 MG/1
150 TABLET, EXTENDED RELEASE ORAL 2 TIMES DAILY
COMMUNITY
End: 2025-07-14

## 2025-07-06 ENCOUNTER — ANESTHESIA EVENT (OUTPATIENT)
Dept: SURGERY | Facility: CLINIC | Age: 70
End: 2025-07-06
Payer: COMMERCIAL

## 2025-07-06 ASSESSMENT — COPD QUESTIONNAIRES: COPD: 1

## 2025-07-06 NOTE — ANESTHESIA PREPROCEDURE EVALUATION
Anesthesia Pre-Procedure Evaluation    Patient: Herminia Pastor   MRN: [id number] : 1955          Procedure : Procedure(s):  BRONCHOSCOPY, flexible and rigid with balloon dilation, tissue debulking, endobronchial ultrasound and transbronchial needle aspiration, possible stent         Past Medical History:   Diagnosis Date    Acoustic neuroma (H)     Allergic rhinitis 1980    Dry throat     Gastro-oesophageal reflux disease     Hearing loss     Heartburn     Hypertension     Itchy eyes     Night sweats     Ringing in ears     Sneezing     Snoring     Tinnitus     Visual loss     Weight gain       Past Surgical History:   Procedure Laterality Date    APPENDECTOMY      CHOLECYSTECTOMY      COLONOSCOPY N/A 10/10/2022    Procedure: COLONOSCOPY, FLEXIBLE, WITH LESION REMOVAL USING SNARE;  Surgeon: Penelope Garrett MD;  Location:  GI    CRANIOTOMY TRANSLABYRINTHINE, EXCISE ACOUSTIC NEUROMA (NEURO)  2014    Procedure: CRANIOTOMY TRANSLABYRINTHINE, EXCISE ACOUSTIC NEUROMA;  Surgeon: Wilfrid Garcia MD;  Location: [facility] OR    GYN SURGERY      uterine cystectomy    HERNIA REPAIR      REMOVE HARDWARE FACE Right 2015    Procedure: REMOVE HARDWARE FACE;  Surgeon: Wilfrid Garcia MD;  Location: [facility] OR      Allergies   Allergen Reactions    Dust Mites     Grass     Mold     Ragweeds Other (See Comments)     Crustiness in nose, headaches    Trees       Social History     Tobacco Use    Smoking status: Every Day     Current packs/day: 0.15     Average packs/day: 0.5 packs/day for 54.5 years (27.1 ttl pk-yrs)     Types: Cigarettes     Start date:      Passive exposure: Current    Smokeless tobacco: Never   Substance Use Topics    Alcohol use: Yes     Comment: occasional use- once every other week- 1-3 drinks (social)      Wt Readings from Last 1 Encounters:   25 67.1 kg (148 lb)        Anesthesia Evaluation   Pt has had prior anesthetic.     No history of anesthetic complications       ROS/MED  "HX  ENT/Pulmonary: Comment: Perihilar Lung Mass    (+) sleep apnea,                         COPD,              Neurologic: Comment: Vestibular Schwanoma       Cardiovascular:     (+)  hypertension- -   -  - -                                      METS/Exercise Tolerance:     Hematologic:  - neg hematologic  ROS     Musculoskeletal:  - neg musculoskeletal ROS     GI/Hepatic:     (+) GERD,                   Renal/Genitourinary:  - neg Renal ROS     Endo:     (+)  type II DM,                    Psychiatric/Substance Use:  - neg psychiatric ROS     Infectious Disease:  - neg infectious disease ROS     Malignancy:  - neg malignancy ROS     Other:  - neg other ROS            Physical Exam  Airway  Mallampati: I  TM distance: >3 FB  Neck ROM: full  Upper bite lip test: I  Mouth opening: >= 4 cm    Cardiovascular - normal exam   Dental   (+) Minor Abnormalities - some fillings, tiny chips    increased risk of dental damage  Pulmonary - normal exam      Neurological - normal exam  She appears awake, alert and oriented x3.    Other Findings       OUTSIDE LABS:  CBC:   Lab Results   Component Value Date    WBC 6.6 04/16/2025    WBC 10.3 08/19/2014    HGB 15.4 04/16/2025    HGB 14.5 11/05/2015    HCT 48.8 (H) 04/16/2025    HCT 36.9 08/19/2014     04/16/2025     (L) 08/19/2014     BMP:   Lab Results   Component Value Date     04/16/2025     01/03/2024    POTASSIUM 4.3 04/16/2025    POTASSIUM 4.2 01/03/2024    CHLORIDE 104 04/16/2025    CHLORIDE 101 01/03/2024    CO2 27 04/16/2025    CO2 29 01/03/2024    BUN 15.5 04/16/2025    BUN 13.0 01/03/2024    CR 0.72 04/16/2025    CR 0.73 01/03/2024    GLC 94 04/16/2025    GLC 83 01/03/2024     COAGS: No results found for: \"PTT\", \"INR\", \"FIBR\"  POC:   Lab Results   Component Value Date    BGM 80 08/20/2014     HEPATIC: No results found for: \"ALBUMIN\", \"PROTTOTAL\", \"ALT\", \"AST\", \"GGT\", \"ALKPHOS\", \"BILITOTAL\", \"BILIDIRECT\", \"MIGUEL\"  OTHER:   Lab Results   Component " Value Date    PH 7.47 (H) 08/18/2014    A1C 5.7 (H) 04/16/2025    ELIAS 10.7 (H) 04/16/2025    PHOS 2.9 08/19/2014    MAG 2.5 (H) 08/19/2014    TSH 2.13 04/16/2025       Anesthesia Plan    ASA Status:  3      NPO Status: NPO Appropriate   Anesthesia Type: General.  Airway: oral.  Induction: intravenous.  Maintenance: TIVA.   Techniques and Equipment:     - Airway:  Planned airway equipment includes jet ventilation.     - Monitoring Plan: standard ASA monitoring, train of four monitoring, INESSA, processed EEG monitor     Consents    Anesthesia Plan(s) and associated risks, benefits, and realistic alternatives discussed. Questions answered and patient/representative(s) expressed understanding.     - Discussed: CRNA     - Discussed with:  Patient        - Pt is DNR/DNI Status: no DNR          Postoperative Care    Pain management: non-narcotic analgesics, plan for postoperative opioid use, multimodal analgesia.     Comments:                   Junaid Smith MD    I have reviewed the pertinent notes and labs in the chart from the past 30 days and (re)examined the patient.  Any updates or changes from those notes are reflected in this note.    Clinically Significant Risk Factors Present on Admission                   # Hypertension: Noted on problem list           # Overweight: Estimated body mass index is 28.9 kg/m  as calculated from the following:    Height as of 7/1/25: 1.524 m (5').    Weight as of 7/1/25: 67.1 kg (148 lb).

## 2025-07-07 ENCOUNTER — HOSPITAL ENCOUNTER (OUTPATIENT)
Facility: CLINIC | Age: 70
Discharge: HOME | End: 2025-07-07
Attending: STUDENT IN AN ORGANIZED HEALTH CARE EDUCATION/TRAINING PROGRAM | Admitting: STUDENT IN AN ORGANIZED HEALTH CARE EDUCATION/TRAINING PROGRAM
Payer: COMMERCIAL

## 2025-07-07 ENCOUNTER — ANESTHESIA (OUTPATIENT)
Dept: SURGERY | Facility: CLINIC | Age: 70
End: 2025-07-07
Payer: COMMERCIAL

## 2025-07-07 VITALS
HEIGHT: 60 IN | HEART RATE: 64 BPM | BODY MASS INDEX: 29.43 KG/M2 | DIASTOLIC BLOOD PRESSURE: 62 MMHG | WEIGHT: 149.91 LBS | TEMPERATURE: 98.8 F | SYSTOLIC BLOOD PRESSURE: 130 MMHG | RESPIRATION RATE: 18 BRPM | OXYGEN SATURATION: 93 %

## 2025-07-07 LAB
GLUCOSE BLDC GLUCOMTR-MCNC: 134 MG/DL (ref 70–99)
LOCATION OF TASK: NORMAL
PATH REPORT.COMMENTS IMP SPEC: NORMAL
PATH REPORT.FINAL DX SPEC: NORMAL
PATH REPORT.MICROSCOPIC SPEC OTHER STN: NORMAL
PATH REPORT.RELEVANT HX SPEC: NORMAL

## 2025-07-07 PROCEDURE — 88173 CYTOPATH EVAL FNA REPORT: CPT | Mod: TC | Performed by: STUDENT IN AN ORGANIZED HEALTH CARE EDUCATION/TRAINING PROGRAM

## 2025-07-07 PROCEDURE — 31641 BRONCHOSCOPY TREAT BLOCKAGE: CPT | Mod: GC | Performed by: STUDENT IN AN ORGANIZED HEALTH CARE EDUCATION/TRAINING PROGRAM

## 2025-07-07 PROCEDURE — 710N000010 HC RECOVERY PHASE 1, LEVEL 2, PER MIN: Performed by: STUDENT IN AN ORGANIZED HEALTH CARE EDUCATION/TRAINING PROGRAM

## 2025-07-07 PROCEDURE — 250N000013 HC RX MED GY IP 250 OP 250 PS 637: Performed by: STUDENT IN AN ORGANIZED HEALTH CARE EDUCATION/TRAINING PROGRAM

## 2025-07-07 PROCEDURE — 88342 IMHCHEM/IMCYTCHM 1ST ANTB: CPT | Mod: 26 | Performed by: PATHOLOGY

## 2025-07-07 PROCEDURE — 88360 TUMOR IMMUNOHISTOCHEM/MANUAL: CPT | Mod: 26 | Performed by: PATHOLOGY

## 2025-07-07 PROCEDURE — 88305 TISSUE EXAM BY PATHOLOGIST: CPT | Mod: 26 | Performed by: PATHOLOGY

## 2025-07-07 PROCEDURE — 88189 FLOWCYTOMETRY/READ 16 & >: CPT | Performed by: PATHOLOGY

## 2025-07-07 PROCEDURE — 250N000011 HC RX IP 250 OP 636: Performed by: STUDENT IN AN ORGANIZED HEALTH CARE EDUCATION/TRAINING PROGRAM

## 2025-07-07 PROCEDURE — 360N000084 HC SURGERY LEVEL 4 W/ FLUORO, PER MIN: Performed by: STUDENT IN AN ORGANIZED HEALTH CARE EDUCATION/TRAINING PROGRAM

## 2025-07-07 PROCEDURE — 999N000141 HC STATISTIC PRE-PROCEDURE NURSING ASSESSMENT: Performed by: STUDENT IN AN ORGANIZED HEALTH CARE EDUCATION/TRAINING PROGRAM

## 2025-07-07 PROCEDURE — 88173 CYTOPATH EVAL FNA REPORT: CPT | Mod: 26 | Performed by: PATHOLOGY

## 2025-07-07 PROCEDURE — 250N000009 HC RX 250: Performed by: NURSE ANESTHETIST, CERTIFIED REGISTERED

## 2025-07-07 PROCEDURE — 370N000017 HC ANESTHESIA TECHNICAL FEE, PER MIN: Performed by: STUDENT IN AN ORGANIZED HEALTH CARE EDUCATION/TRAINING PROGRAM

## 2025-07-07 PROCEDURE — 82962 GLUCOSE BLOOD TEST: CPT

## 2025-07-07 PROCEDURE — 88185 FLOWCYTOMETRY/TC ADD-ON: CPT | Performed by: STUDENT IN AN ORGANIZED HEALTH CARE EDUCATION/TRAINING PROGRAM

## 2025-07-07 PROCEDURE — 250N000009 HC RX 250: Performed by: STUDENT IN AN ORGANIZED HEALTH CARE EDUCATION/TRAINING PROGRAM

## 2025-07-07 PROCEDURE — 272N000001 HC OR GENERAL SUPPLY STERILE: Performed by: STUDENT IN AN ORGANIZED HEALTH CARE EDUCATION/TRAINING PROGRAM

## 2025-07-07 PROCEDURE — 88172 CYTP DX EVAL FNA 1ST EA SITE: CPT | Mod: 26 | Performed by: PATHOLOGY

## 2025-07-07 PROCEDURE — 258N000003 HC RX IP 258 OP 636: Performed by: NURSE ANESTHETIST, CERTIFIED REGISTERED

## 2025-07-07 PROCEDURE — 250N000011 HC RX IP 250 OP 636: Performed by: NURSE ANESTHETIST, CERTIFIED REGISTERED

## 2025-07-07 PROCEDURE — 31653 BRONCH EBUS SAMPLNG 3/> NODE: CPT | Mod: GC | Performed by: STUDENT IN AN ORGANIZED HEALTH CARE EDUCATION/TRAINING PROGRAM

## 2025-07-07 PROCEDURE — 88341 IMHCHEM/IMCYTCHM EA ADD ANTB: CPT | Mod: 26 | Performed by: PATHOLOGY

## 2025-07-07 RX ORDER — DEXAMETHASONE SODIUM PHOSPHATE 4 MG/ML
4 INJECTION, SOLUTION INTRA-ARTICULAR; INTRALESIONAL; INTRAMUSCULAR; INTRAVENOUS; SOFT TISSUE
Status: DISCONTINUED | OUTPATIENT
Start: 2025-07-07 | End: 2025-07-07 | Stop reason: HOSPADM

## 2025-07-07 RX ORDER — ONDANSETRON 4 MG/1
4 TABLET, ORALLY DISINTEGRATING ORAL EVERY 30 MIN PRN
Status: DISCONTINUED | OUTPATIENT
Start: 2025-07-07 | End: 2025-07-07 | Stop reason: HOSPADM

## 2025-07-07 RX ORDER — LIDOCAINE 40 MG/G
CREAM TOPICAL
Status: DISCONTINUED | OUTPATIENT
Start: 2025-07-07 | End: 2025-07-07 | Stop reason: HOSPADM

## 2025-07-07 RX ORDER — PROPOFOL 10 MG/ML
INJECTION, EMULSION INTRAVENOUS PRN
Status: DISCONTINUED | OUTPATIENT
Start: 2025-07-07 | End: 2025-07-07

## 2025-07-07 RX ORDER — DIPHENHYDRAMINE HYDROCHLORIDE 50 MG/ML
12.5 INJECTION, SOLUTION INTRAMUSCULAR; INTRAVENOUS EVERY 6 HOURS PRN
Status: DISCONTINUED | OUTPATIENT
Start: 2025-07-07 | End: 2025-07-07 | Stop reason: HOSPADM

## 2025-07-07 RX ORDER — SODIUM CHLORIDE, SODIUM LACTATE, POTASSIUM CHLORIDE, CALCIUM CHLORIDE 600; 310; 30; 20 MG/100ML; MG/100ML; MG/100ML; MG/100ML
INJECTION, SOLUTION INTRAVENOUS CONTINUOUS PRN
Status: DISCONTINUED | OUTPATIENT
Start: 2025-07-07 | End: 2025-07-07

## 2025-07-07 RX ORDER — LABETALOL HYDROCHLORIDE 5 MG/ML
10 INJECTION, SOLUTION INTRAVENOUS
Status: DISCONTINUED | OUTPATIENT
Start: 2025-07-07 | End: 2025-07-07 | Stop reason: HOSPADM

## 2025-07-07 RX ORDER — SODIUM CHLORIDE, SODIUM LACTATE, POTASSIUM CHLORIDE, CALCIUM CHLORIDE 600; 310; 30; 20 MG/100ML; MG/100ML; MG/100ML; MG/100ML
INJECTION, SOLUTION INTRAVENOUS CONTINUOUS
Status: DISCONTINUED | OUTPATIENT
Start: 2025-07-07 | End: 2025-07-07 | Stop reason: HOSPADM

## 2025-07-07 RX ORDER — ONDANSETRON 2 MG/ML
INJECTION INTRAMUSCULAR; INTRAVENOUS PRN
Status: DISCONTINUED | OUTPATIENT
Start: 2025-07-07 | End: 2025-07-07

## 2025-07-07 RX ORDER — ONDANSETRON 2 MG/ML
4 INJECTION INTRAMUSCULAR; INTRAVENOUS EVERY 30 MIN PRN
Status: DISCONTINUED | OUTPATIENT
Start: 2025-07-07 | End: 2025-07-07 | Stop reason: HOSPADM

## 2025-07-07 RX ORDER — OXYCODONE HYDROCHLORIDE 5 MG/1
5 TABLET ORAL
Status: DISCONTINUED | OUTPATIENT
Start: 2025-07-07 | End: 2025-07-07 | Stop reason: HOSPADM

## 2025-07-07 RX ORDER — NALOXONE HYDROCHLORIDE 0.4 MG/ML
0.1 INJECTION, SOLUTION INTRAMUSCULAR; INTRAVENOUS; SUBCUTANEOUS
Status: DISCONTINUED | OUTPATIENT
Start: 2025-07-07 | End: 2025-07-07 | Stop reason: HOSPADM

## 2025-07-07 RX ORDER — FENTANYL CITRATE 50 UG/ML
INJECTION, SOLUTION INTRAMUSCULAR; INTRAVENOUS PRN
Status: DISCONTINUED | OUTPATIENT
Start: 2025-07-07 | End: 2025-07-07

## 2025-07-07 RX ORDER — DEXAMETHASONE SODIUM PHOSPHATE 4 MG/ML
INJECTION, SOLUTION INTRA-ARTICULAR; INTRALESIONAL; INTRAMUSCULAR; INTRAVENOUS; SOFT TISSUE PRN
Status: DISCONTINUED | OUTPATIENT
Start: 2025-07-07 | End: 2025-07-07

## 2025-07-07 RX ORDER — LIDOCAINE HYDROCHLORIDE 20 MG/ML
INJECTION, SOLUTION INFILTRATION; PERINEURAL PRN
Status: DISCONTINUED | OUTPATIENT
Start: 2025-07-07 | End: 2025-07-07

## 2025-07-07 RX ORDER — ACETAMINOPHEN 325 MG/1
975 TABLET ORAL ONCE
Status: COMPLETED | OUTPATIENT
Start: 2025-07-07 | End: 2025-07-07

## 2025-07-07 RX ORDER — FENTANYL CITRATE 50 UG/ML
25 INJECTION, SOLUTION INTRAMUSCULAR; INTRAVENOUS EVERY 5 MIN PRN
Status: DISCONTINUED | OUTPATIENT
Start: 2025-07-07 | End: 2025-07-07 | Stop reason: HOSPADM

## 2025-07-07 RX ORDER — HYDROMORPHONE HCL IN WATER/PF 6 MG/30 ML
0.2 PATIENT CONTROLLED ANALGESIA SYRINGE INTRAVENOUS EVERY 5 MIN PRN
Status: DISCONTINUED | OUTPATIENT
Start: 2025-07-07 | End: 2025-07-07 | Stop reason: HOSPADM

## 2025-07-07 RX ORDER — OXYCODONE HYDROCHLORIDE 10 MG/1
10 TABLET ORAL
Status: DISCONTINUED | OUTPATIENT
Start: 2025-07-07 | End: 2025-07-07 | Stop reason: HOSPADM

## 2025-07-07 RX ORDER — FENTANYL CITRATE 50 UG/ML
50 INJECTION, SOLUTION INTRAMUSCULAR; INTRAVENOUS EVERY 5 MIN PRN
Status: DISCONTINUED | OUTPATIENT
Start: 2025-07-07 | End: 2025-07-07 | Stop reason: HOSPADM

## 2025-07-07 RX ORDER — HYDRALAZINE HYDROCHLORIDE 20 MG/ML
2.5-5 INJECTION INTRAMUSCULAR; INTRAVENOUS EVERY 10 MIN PRN
Status: DISCONTINUED | OUTPATIENT
Start: 2025-07-07 | End: 2025-07-07 | Stop reason: HOSPADM

## 2025-07-07 RX ORDER — DIPHENHYDRAMINE HCL 12.5MG/5ML
12.5 LIQUID (ML) ORAL EVERY 6 HOURS PRN
Status: DISCONTINUED | OUTPATIENT
Start: 2025-07-07 | End: 2025-07-07 | Stop reason: HOSPADM

## 2025-07-07 RX ORDER — IPRATROPIUM BROMIDE AND ALBUTEROL SULFATE 2.5; .5 MG/3ML; MG/3ML
3 SOLUTION RESPIRATORY (INHALATION) ONCE
Status: COMPLETED | OUTPATIENT
Start: 2025-07-07 | End: 2025-07-07

## 2025-07-07 RX ORDER — HYDROMORPHONE HCL IN WATER/PF 6 MG/30 ML
0.4 PATIENT CONTROLLED ANALGESIA SYRINGE INTRAVENOUS EVERY 5 MIN PRN
Status: DISCONTINUED | OUTPATIENT
Start: 2025-07-07 | End: 2025-07-07 | Stop reason: HOSPADM

## 2025-07-07 RX ORDER — PROPOFOL 10 MG/ML
INJECTION, EMULSION INTRAVENOUS CONTINUOUS PRN
Status: DISCONTINUED | OUTPATIENT
Start: 2025-07-07 | End: 2025-07-07

## 2025-07-07 RX ADMIN — IPRATROPIUM BROMIDE AND ALBUTEROL SULFATE 3 ML: .5; 3 SOLUTION RESPIRATORY (INHALATION) at 07:31

## 2025-07-07 RX ADMIN — Medication 50 MG: at 07:52

## 2025-07-07 RX ADMIN — FENTANYL CITRATE 50 MCG: 50 INJECTION, SOLUTION INTRAMUSCULAR; INTRAVENOUS at 10:41

## 2025-07-07 RX ADMIN — PROPOFOL 200 MCG/KG/MIN: 10 INJECTION, EMULSION INTRAVENOUS at 07:53

## 2025-07-07 RX ADMIN — FENTANYL CITRATE 75 MCG: 50 INJECTION INTRAMUSCULAR; INTRAVENOUS at 07:50

## 2025-07-07 RX ADMIN — PROPOFOL 100 MG: 10 INJECTION, EMULSION INTRAVENOUS at 07:52

## 2025-07-07 RX ADMIN — HYDROMORPHONE HYDROCHLORIDE 0.2 MG: 0.2 INJECTION, SOLUTION INTRAMUSCULAR; INTRAVENOUS; SUBCUTANEOUS at 11:04

## 2025-07-07 RX ADMIN — ACETAMINOPHEN 975 MG: 325 TABLET, FILM COATED ORAL at 07:31

## 2025-07-07 RX ADMIN — SODIUM CHLORIDE, SODIUM LACTATE, POTASSIUM CHLORIDE, AND CALCIUM CHLORIDE: .6; .31; .03; .02 INJECTION, SOLUTION INTRAVENOUS at 07:36

## 2025-07-07 RX ADMIN — LIDOCAINE HYDROCHLORIDE 60 MG: 20 INJECTION, SOLUTION INFILTRATION; PERINEURAL at 07:51

## 2025-07-07 RX ADMIN — FENTANYL CITRATE 25 MCG: 50 INJECTION INTRAMUSCULAR; INTRAVENOUS at 07:58

## 2025-07-07 RX ADMIN — DEXAMETHASONE SODIUM PHOSPHATE 8 MG: 4 INJECTION, SOLUTION INTRAMUSCULAR; INTRAVENOUS at 07:56

## 2025-07-07 RX ADMIN — Medication 100 MG: at 09:05

## 2025-07-07 RX ADMIN — ONDANSETRON 4 MG: 2 INJECTION INTRAMUSCULAR; INTRAVENOUS at 08:56

## 2025-07-07 RX ADMIN — Medication 1 LOZENGE: at 10:39

## 2025-07-07 RX ADMIN — PROPOFOL 30 MG: 10 INJECTION, EMULSION INTRAVENOUS at 08:57

## 2025-07-07 ASSESSMENT — ACTIVITIES OF DAILY LIVING (ADL)
ADLS_ACUITY_SCORE: 38

## 2025-07-07 NOTE — ANESTHESIA PROCEDURE NOTES
Airway       Patient location during procedure: OR       Procedure Start/Stop Times: 7/7/2025 7:54 AM  Staff -        Anesthesiologist:  Junaid Smith MD       CRNA: Joaan Yanez APRN CRNA       Performed By: CRNA  Consent for Airway        Urgency: elective  Indications and Patient Condition       Indications for airway management: chase-procedural       Induction type:intravenous       Mask difficulty assessment: 2 - vent by mask + OA or adjuvant +/- NMBA    Final Airway Details       Final airway type: endotracheal airway       Successful airway: ETT - single and Oral  Endotracheal Airway Details        ETT size (mm): 8.5       Cuffed: yes       Successful intubation technique: direct laryngoscopy       DL Blade Type: Sanders 2       Grade View of Cords: 2 (clear amnd open)       Adjucts: stylet       Position: Right       Measured from: lips       Secured at (cm): 21       Bite block used: None    Post intubation assessment        Placement verified by: capnometry, equal breath sounds and chest rise        Number of attempts at approach: 1       Number of other approaches attempted: 0       Secured with: tape       Ease of procedure: easy       Dentition: Intact and Unchanged    Medication(s) Administered   Medication Administration Time: 7/7/2025 7:54 AM

## 2025-07-07 NOTE — ANESTHESIA CARE TRANSFER NOTE
Patient: Herminia Pastor    Procedure: Procedure(s):  BRONCHOSCOPY, flexible, endobronchial ultrasound and transbronchial needle aspiration, and cryoprobe clot extraction       Diagnosis: Mass of left lung [R91.8]  Wheezing [R06.2]  Diagnosis Additional Information: No value filed.    Anesthesia Type:   General     Note:    Oropharynx: oropharynx clear of all foreign objects and spontaneously breathing  Level of Consciousness: drowsy  Oxygen Supplementation: face mask (non RB)  Level of Supplemental Oxygen (L/min / FiO2): 8  Independent Airway: airway patency satisfactory and stable  Dentition: dentition unchanged  Vital Signs Stable: post-procedure vital signs reviewed and stable  Report to RN Given: handoff report given  Patient transferred to: PACU    Handoff Report: Identifed the Patient, Identified the Reponsible Provider, Reviewed the pertinent medical history, Discussed the surgical course, Reviewed Intra-OP anesthesia mangement and issues during anesthesia, Set expectations for post-procedure period and Allowed opportunity for questions and acknowledgement of understanding    Vitals:  Vitals Value Taken Time   BP     Temp     Pulse 66 07/07/25 09:26   Resp 20 07/07/25 09:26   SpO2 91 % 07/07/25 09:26   Vitals shown include unfiled device data.    Electronically Signed By: BRIAN Robles CRNA  July 7, 2025  9:26 AM

## 2025-07-07 NOTE — PROCEDURES
INTERVENTIONAL PULMONOLOGY       Procedure(s):    A flexible bronchoscopy  Airway exam  EBUS-TBNA (4 sites)  Therapeutic suctioning (2 sites)    Indication: L hilar mass and mediastinal, hilar lymphadenopathy (seen on low dose CT for Lung cancer screening; patient is symptomatic with cough, wheezing)    Attending of Record:  Candy Harris MD     Interventional Pulmonary Fellow   Karie Cordon MD     Medications:    General Anesthesia - See anesthesia flowsheet for details    Sedation Time:   Per Anesthesia Care Provider    Time Out:  Performed    The patient's medical record has been reviewed.  The indication for the procedure was reviewed.  The necessary history and physical examination was performed and reviewed.  The risks, benefits and alternatives of the procedure were discussed with the the patient in detail and they had the opportunity to ask questions. She was seen (with her permission) with her daughter Harper. We discussed in particular the potential complications including risks of minor or life-threatening bleeding and/or infection, respiratory failure, vocal cord trauma / paralysis, pneumothorax, and discomfort. Sedation risks were also discussed including abnormal heart rhythms, low blood pressure, and respiratory failure. All questions were answered to the best of my ability.  Verbal and written informed consent was obtained.  The proposed procedure and the patient's identification were verified prior to the procedure by the physician and the nurse.    The patient was assessed for the adequacy for the procedure and to receive medications.   Mental Status:  Alert and oriented x 3  Airway examination:  Class I (Complete visualization of soft palate)  Pulmonary:  Clear to ausculation bilaterally  CV:  RRR, no murmurs or gallops  ASA Grade:  (II)  Mild systemic disease    After clinical evaluation and reviewing the indication, risks, alternatives and benefits of the procedure the patient was deemed to  be in satisfactory condition to undergo the procedure.      Immediately before administration of medications the patient was re-assessed for adequacy to receive sedatives including the heart rate, respiratory rate, mental status, oxygen saturation, blood pressure and adequacy of pulmonary ventilation. These same parameters were continuously monitored throughout the procedure.    A Tuberculosis risk assessment was performed:  The patient has no known RISK of Tuberculosis    The procedure was performed in a negative airflow room: The patient could not be moved to a negative airflow room because of needed OR for the procedure    Maneuvers / Procedure:      Airway Examination: A complete airway examination was performed from the distal trachea to the subsegmental level in each lobe of both lungs.  Pertinent findings include irregular mucosa with tumor infiltration in the ANNA and LLL narrowing the orifices to both; extrinsic malignant airway obstruction was seen in the ANNA and LLL (but the left main stem bronchus was patent).      EBUS-TBNA (LN): Thoracic lymph node staging:    Station 2R: not evaluated (ETT in place)  Station 2L: not evaluated (ETT in place)  Station 4R: visualized (~1cm) and biopsied. A total of 4 biopsies were performed using 22G FNA Needle.   Station 4L: visualized (~1.5cm) and biopsied. A total of 4 biopsies were performed using 22G FNA Needle.   Station 7: visualized (~2cm) and biopsied. A total of 4 biopsies were performed using 22G FNA Needle.   Station 10R: not visualized  Station 10L: not visualized  Station 11R: visualized and not biopsied. Reason: LN diameter was <5mm  Station 11L: visualized and not biopsied. Reason: ANNA mass was biopsied in the 11L territory   Station 12R: not visualized  Station 12L: not visualized    Nidhi was Present     EBUS-TBNA (Nodule/Mass): The EBUS scope was inserted and the ANNA nodule/mass was visualized and biopsied. A total of 9 biopsies were performed using 22G  FNA Needle. . Nidhi was Present- prelim lesional, possible small cell.    Therapeutic suctioning: 15-20min of operative time was spent clearing out the airway of debris, blood and mucous prior to the intervention.     Tissue Debulking: The ANNA airway was accessed and a blood clot was was removed en bloc using the 2.4mm ERBE cryoprobe. Hemostasis and patency of the airway was acheived post-debulking.    Relevant Pictures    Left main stem bronchus with extrinsic and intrinsic tumor infiltration      LC2      Station 4L      Station 7      ANNA mass      LC2 (clot was extracted with cyroprobe from the ANNA post cEBUS)      Recommendations:     -->  PACU post procedure recovery and monitoring  --> Await results from EBUS-TBNA (stations 4R, 7, 4L and the ANNA nodule/mass); results routed to referring IP physician, Dr. Manjarrez as well  --> Elected not to place lobar or left main stem stent because the left main is patent and when the diagnosis is confirmed, she will hopefully start systemic therapy   --> Informed her daughter Harper the procedure concluded.      Karie Cordon MD, IP Fellow  Attending: Dr. Steven Guerrero, LATA   Interventional Pulmonary Care Coordinator   Office: [phone]  Email: [email or web address]

## 2025-07-07 NOTE — DISCHARGE INSTRUCTIONS
Post Bronchoscopy Patient Instructions:    July 7, 2025  Ms. Herminia Pastor    Your procedure completed (bronchoscopy with lung and lymph node biopsies) without any immediate complications.  You may cough up scant amount of blood for the next 12-24 hours. If you have excessive cough with blood, chest pain, shortness of breath, please report to the closest emergency room.    You may experience low grade (less than 100.5 F) fever next 24 hours, if so, you can take Tylenol. If the fever persists more than 24 hours, please contact to our office or your primary care provider.    Our office (Thoracic/Pulmonary--[phone]) will call you with the results of samples taken during the procedure. Please note that you may get a result notification through  My Chart  before us calling you, as the Laboratories are instructed to release the results as soon as they are available to the patients and providers at the same time. Please allow your us 24-48 hours call you to discuss the results.    You may resume your diet as it was prior to procedure.    You may resume your medications after the procedure unless you are instructed to do differently.     Please follow instructions from the nursing staff upon discharge in terms of activity. In general, you should avoid any attention or motor skill requiring activities (e.g., driving or operating any motorized vehicle) for 24 hours as you might be still under the effect of sedation medications. Please make sure an adult to accompany you next 24 hours.     Should you have any question, please do not hesitate to call our office.    Karie Cordon MD  Attending: Dr. Candy Harris

## 2025-07-07 NOTE — ANESTHESIA POSTPROCEDURE EVALUATION
Patient: Herminia Pastor    Procedure: Procedure(s):  BRONCHOSCOPY, flexible, endobronchial ultrasound and transbronchial needle aspiration, and cryoprobe clot extraction       Anesthesia Type:  General    Note:  Disposition: Outpatient   Postop Pain Control: Uneventful            Sign Out: Well controlled pain   PONV: No   Neuro/Psych: Uneventful            Sign Out: Acceptable/Baseline neuro status   Airway/Respiratory:             Sign Out: O2 supplementation               Oxygen: Nasal Cannula   CV/Hemodynamics: Uneventful            Sign Out: Acceptable CV status; No obvious hypovolemia; No obvious fluid overload   Other NRE: NONE   DID A NON-ROUTINE EVENT OCCUR? No           Last vitals:  Vitals Value Taken Time   /64 07/07/25 11:00   Temp 36.6  C (97.8  F) 07/07/25 09:24   Pulse 59 07/07/25 11:07   Resp 13 07/07/25 11:07   SpO2 90 % 07/07/25 11:07   Vitals shown include unfiled device data.    Electronically Signed By: Sharon Shin MD  July 7, 2025  11:07 AM

## 2025-07-09 ENCOUNTER — PATIENT OUTREACH (OUTPATIENT)
Dept: ONCOLOGY | Facility: CLINIC | Age: 70
End: 2025-07-09
Payer: COMMERCIAL

## 2025-07-09 DIAGNOSIS — C34.90 SMALL CELL CARCINOMA OF LUNG METASTATIC TO LYMPH NODES OF MULTIPLE SITES (H): Primary | ICD-10-CM

## 2025-07-09 DIAGNOSIS — C77.8 SMALL CELL CARCINOMA OF LUNG METASTATIC TO LYMPH NODES OF MULTIPLE SITES (H): Primary | ICD-10-CM

## 2025-07-09 LAB
PATH REPORT.COMMENTS IMP SPEC: ABNORMAL
PATH REPORT.COMMENTS IMP SPEC: YES
PATH REPORT.FINAL DX SPEC: ABNORMAL
PATH REPORT.GROSS SPEC: ABNORMAL
PATH REPORT.MICROSCOPIC SPEC OTHER STN: ABNORMAL
PATH REPORT.RELEVANT HX SPEC: ABNORMAL

## 2025-07-09 NOTE — TELEPHONE ENCOUNTER
RECORDS STATUS - ALL OTHER DIAGNOSIS      RECORDS RECEIVED FROM: [facility]   NOTES STATUS DETAILS   OFFICE NOTE from referring provider Epic 7/1/25: Dr. Bushra Manjarrez   OPERATIVE REPORT [facility] 7/7/25: BRONCHOSCOPY   MEDICATION LIST [facility]    LABS     PATHOLOGY REPORTS Report in [facility] 7/7/25: [id number]   ANYTHING RELATED TO DIAGNOSIS Epic Most recent 7/7/25   IMAGING (NEED IMAGES & REPORT)     CT SCANS PACS 6/25/25-6/16/20: CT Chest Lung

## 2025-07-09 NOTE — PROGRESS NOTES
New Patient Oncology Nurse Navigator Note     Referring provider: Dr. 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 ]  .  Nodules: .     Bulky left hilar mass encasing the left lower lobe bronchus with  conglomerate adenopathy in the left hilum also narrowing of the left  upper lobe and lingular bronchus take off. There is postobstructive  atelectasis in the left lower lobe. Superior segment of the left lower  lobe bronchus is occluded. There is gas trapping in the left lung.     Emphysema: Trace centrilobular emphysema     Coronary artery calcium: trace     Additional findings: Heart size is normal without pericardial  effusion.  No pleural effusion or pneumothorax. Esophagus is  unremarkable. Borderline adenopathy in the left peritracheal region  and AP window. Bulky left hilar adenopathy.     No acute findings on limited evaluation of the upper abdomen.  Cholecystectomy.   Cyst in the upper pole of the right kidney.     Bones: No bony or soft tissue abnormality.                                                                      Impression:   1. ACR Assessment Category (2022):  Lung-RADS Category 4X.  Very  Suspicious.       Recommendation:  Lung-RADS Category 4X. Very Suspicious.  Recommendation:  Chest CT with or without contrast, PET/CT and/or  tissue sampling depending on the *probability of malignancy and  comorbidities. PET/CT may be used when there is a ? 8 mm (? 268.1 mm3)  solid component. For new large nodules that develop on an  annual repeat screening CT, a1 month LDCT may be recommended to  address  potentially infectious or inflammatory conditions      2. Significant Incidental Finding(s):  Category S: No.     3. Any moderate or severe Emphysema or bronchial wall thickening or  mosaic attenuation? No     4. Avoidance of tobacco smoke is strongly advised. Please consider for  smoking cessation to [facility] Medication Therapy Management (MTM) if  clinically appropriate.        [Access Center: left hilar mass concerning for malignancy until proven  otherwise]     07/07/2025 Non-Gynecologic Cytology (bookmarked) showed:   Final Diagnosis   A. LYMPH NODE, 4R, FINE NEEDLE ASPIRATE:     Interpretation -  - Positive for malignancy, metastatic small cell carcinoma  - See comment  Adequacy: satisfactory for evaluation     B. LYMPH NODES, 7, FINE NEEDLE ASPIRATE:     Interpretation -  - Positive for malignancy, metastatic small cell carcinoma  - See comment  Adequacy: satisfactory for evaluation     C. LYMPH NODE, 4L, FINE NEEDLE ASPIRATE:     Interpretation -  - Positive for malignancy, metastatic small cell carcinoma  - See comment  Adequacy: satisfactory for evaluation     D. LEFT HILAR MASS, FINE NEEDLE ASPIRATE:     Interpretation -  - Positive for malignancy, small cell carcinoma  - See comment  Adequacy: satisfactory for evaluation                       Electronically signed by Angel Hernandez MD on 7/9/2025 at 1106 CDT   Comment    Clusters of atypical cells seen in all parts with features including high nuclear to cytoplasm ratios,occasional molding, mitoses, and apoptotic debris. Immunostaining is  performed with appropriate controls on cell blocks D1. Aggregate of tumor cells in specimen D are strongly and diffusely positive for synaptophysin, chromogranin, and INSM1 with a Ki-67 index of approximately 90%, supporting the diagnosis of small cell carcinoma.    Clinical Information    69-year-old woman with left hilar mass and lymphadenopathy.         Clinical Assessment / Barriers to Care (Per Nurse):  Tobacco History    Smoking Status  Every Day Smoking Start Date  1971 Current Packs/Day  0.2 packs/day Average Packs/Day  0.5 packs/day for 54.5 years (27.1 ttl pk-yrs) Smoking Tobacco Type  Cigarettes since 1971     Records Location: [facility]   Records Needed: None  Additional testing needed prior to consult: PET, brain MRI  Referral updates and Plan:     7/9: Writer called Herminia to discuss the referral. She confirmed she is aware of the referral but requested a call back in a bit to schedule as she is currently in the car. Writer explained the med onc referral and also explained that we try to get pts lined up with rad onc consults as well for this diagnosis. Our schedulers will call her to schedule these consults as well as the PET and brain MRI. Herminia verbalized understanding and will wait for the schedulers to call her later today.    ANASTASIYA HollyN, RN, OCN  [facility] Oncology Nurse Navigator  [phone] / [phone]

## 2025-07-09 NOTE — PROGRESS NOTES
Orders placed per Dr. Manjarrez's request d/t positive biopsy results (below). Provider will call patient today to update her on results/plan.     Nurse navigator notified.      Cytology, non-gynecologic: [id number]  Order: [id number]   Collected 7/7/2025  8:10 AM       Status: Final result    Test Result Released: Yes (not seen)    1 Result Note       View Follow-Up Encounter      Component  Ref Range & Units    Final Diagnosis   A. LYMPH NODE, 4R, FINE NEEDLE ASPIRATE:     Interpretation -  - Positive for malignancy, metastatic small cell carcinoma  - See comment  Adequacy: satisfactory for evaluation     B. LYMPH NODES, 7, FINE NEEDLE ASPIRATE:     Interpretation -  - Positive for malignancy, metastatic small cell carcinoma  - See comment  Adequacy: satisfactory for evaluation     C. LYMPH NODE, 4L, FINE NEEDLE ASPIRATE:     Interpretation -  - Positive for malignancy, metastatic small cell carcinoma  - See comment  Adequacy: satisfactory for evaluation     D. LEFT HILAR MASS, FINE NEEDLE ASPIRATE:     Interpretation -  - Positive for malignancy, small cell carcinoma  - See comment  Adequacy: satisfactory for evaluation

## 2025-07-10 ENCOUNTER — ANCILLARY PROCEDURE (OUTPATIENT)
Dept: MRI IMAGING | Facility: CLINIC | Age: 70
End: 2025-07-10
Attending: INTERNAL MEDICINE
Payer: COMMERCIAL

## 2025-07-10 DIAGNOSIS — C77.8 SMALL CELL CARCINOMA OF LUNG METASTATIC TO LYMPH NODES OF MULTIPLE SITES (H): ICD-10-CM

## 2025-07-10 DIAGNOSIS — C34.90 SMALL CELL CARCINOMA OF LUNG METASTATIC TO LYMPH NODES OF MULTIPLE SITES (H): ICD-10-CM

## 2025-07-10 PROCEDURE — A9585 GADOBUTROL INJECTION: HCPCS | Performed by: RADIOLOGY

## 2025-07-10 PROCEDURE — 70553 MRI BRAIN STEM W/O & W/DYE: CPT | Mod: GC | Performed by: RADIOLOGY

## 2025-07-10 RX ORDER — GADOBUTROL 604.72 MG/ML
7.5 INJECTION INTRAVENOUS ONCE
Status: COMPLETED | OUTPATIENT
Start: 2025-07-10 | End: 2025-07-10

## 2025-07-10 RX ADMIN — GADOBUTROL 7 ML: 604.72 INJECTION INTRAVENOUS at 15:31

## 2025-07-10 NOTE — TELEPHONE ENCOUNTER
MEDICAL RECORDS REQUEST   Radiation Oncology  [address] [place] [address]  Fax: [phone]          FUTURE VISIT INFORMATION                                                   Herminia Pastor, : 1955 scheduled for future visit at [facility] Radiation Oncology    RECORDS REQUESTED FOR VISIT                                                     LUNG     OFFICE NOTE from pulmonologist Epic 25: Dr. Bushra Manjarrez    OFFICE NOTE from thoracic surgeon Epic 25: Dr. Fausto Montilla   PFT Epic 25   OPERATIVE/BIOPSY REPORTS [facility] 25: BRONCHOSCOPY    MEDICATION LIST [facility]    LABS     PATHOLOGY REPORTS Report in [facility] 25: [id number]    ANYTHING RELATED TO DIAGNOSIS Epic Most recent 25   IMAGING (NEED IMAGES & REPORT)     CT SCANS PACS 25-20: CT Chest Lung    MRI PACS 7/10/25: MR Brain   PET PACS 25: PET Whole Body

## 2025-07-10 NOTE — DISCHARGE INSTRUCTIONS
MRI Contrast Discharge Instructions    The IV contrast you received today will pass out of your body in your  urine. This will happen in the next 24 hours. You will not feel this process.  Your urine will not change color.    Drink at least 4 extra glasses of water or juice today (unless your doctor  has restricted your fluids). This reduces the stress on your kidneys.  You may take your regular medicines.    If you are on dialysis: It is best to have dialysis today.    If you have a reaction: Most reactions happen right away. If you have  any new symptoms after leaving the hospital (such as hives or swelling),  call your hospital at the correct number below. Or call your family doctor.  If you have breathing distress or wheezing, call 911.    Special instructions: ***    I have read and understand the above information.    Signature:______________________________________ Date:___________    Staff:__________________________________________ Date:___________     Time:__________    [place] [facility] Departments:    ___[place]: [phone]  ___[facility]: [phone]  ___[facility]: [phone] ___[facility]: [phone]  ___[facility]: [phone]  ___[facility]: [phone]  ___[phone]  ___[facility]: [phone]  ___[phone]

## 2025-07-13 NOTE — PROGRESS NOTES
CC:  SCLC (Left lung)    HPI: The patient is a 70 yo female with a diagnosis of SCLC. She is an ex-smoker with a 30 pack year hx of smoking.  She had been getting annual lung cancer screening but unfortunately this year she was a few months behind annual schedule.  When she had a CT it was found with suspicious findings left hilum.  She was experiencing wheezing and coughing and evaluated for acute bronchitis around the same time as her annual lung cancer screening. She had been experiencing wheezing and coughing since March, initially thought to be due to viral infection after exposure to a baby. Breathing seemed normal until the week before her EBUS when she experienced exertional dyspnea with mowing the lawn. She was still taking prednisone and doxycycline as it had helped with chest pain.     An EBUS on July 7, 2025 showed SCLC in stations 7, 4R, 4, and the left hilar mass.  Ki-67 was 90%.  The tumor was diffusely positive for diffusely positive for synaptophysin, chromogranin, and INSM1.  When I first met the patient on July 13, 2025, her PET was still pending. Brain MRI showed a 3 mm diffusion restricting, enhancing lesion within the left inferior midbrain and in subependymal white matter of the posterior horn of the left lateral ventricle, concerning for metastasis.    When I saw the patient on 7/14/25 we discussed that the cancer appeared limited although there was a 3 mm lesion in the brain.   The brain lesion was too small to characterize.  The PET wcan was pending.  I told the patient we would see her back in one week and plan on starting carboplatinum and etoposide at that time.  If there was extensive disease we would add immunotherapy.    ROS:    The patient felt well.  She has an occasional wheeze.    LAB:    CBC RESULTS:   Recent Labs   Lab Test 04/16/25  1623   WBC 6.6   RBC 5.60*   HGB 15.4   HCT 48.8*   MCV 87   MCH 27.5   MCHC 31.6   RDW 13.2         Last Comprehensive Metabolic  Panel:  Sodium   Date Value Ref Range Status   04/16/2025 144 135 - 145 mmol/L Final   08/19/2014 139 133 - 144 mmol/L Final     Potassium   Date Value Ref Range Status   04/16/2025 4.3 3.4 - 5.3 mmol/L Final   11/05/2015 3.5 3.4 - 5.3 mmol/L Final     Chloride   Date Value Ref Range Status   04/16/2025 104 98 - 107 mmol/L Final   08/19/2014 109 94 - 109 mmol/L Final     Carbon Dioxide   Date Value Ref Range Status   08/19/2014 28 20 - 32 mmol/L Final     Carbon Dioxide (CO2)   Date Value Ref Range Status   04/16/2025 27 22 - 29 mmol/L Final     Anion Gap   Date Value Ref Range Status   04/16/2025 13 7 - 15 mmol/L Final   08/19/2014 2 (L) 6 - 17 mmol/L Final     Glucose   Date Value Ref Range Status   08/19/2014 94 70 - 99 mg/dL Final     Comment:     Effective 7/30/2014, the reference range for this assay has changed to reflect   new instrumentation/methodology.       GLUCOSE BY METER POCT   Date Value Ref Range Status   07/07/2025 134 (H) 70 - 99 mg/dL Final     Urea Nitrogen   Date Value Ref Range Status   04/16/2025 15.5 8.0 - 23.0 mg/dL Final   08/19/2014 10 7 - 30 mg/dL Final     Comment:     Effective 7/30/2014, the reference range for this assay has changed to reflect   new instrumentation/methodology.       Creatinine   Date Value Ref Range Status   04/16/2025 0.72 0.51 - 0.95 mg/dL Final   11/05/2015 0.70 0.52 - 1.04 mg/dL Final     GFR Estimate   Date Value Ref Range Status   04/16/2025 90 >60 mL/min/1.73m2 Final     Comment:     eGFR calculated using 2021 CKD-EPI equation.   11/05/2015 85 >60 mL/min/1.7m2 Final     Comment:     Non  GFR Calc     Calcium   Date Value Ref Range Status   04/16/2025 10.7 (H) 8.8 - 10.4 mg/dL Final   08/19/2014 8.1 (L) 8.5 - 10.1 mg/dL Final     Comment:     Effective 7/30/2014, the reference range for this assay has changed to reflect   new instrumentation/methodology.       PATHOLOGY:    From 7/7/25 EBUS    Final Diagnosis   A. LYMPH NODE, 4R, FINE NEEDLE  ASPIRATE:     Interpretation -  - Positive for malignancy, metastatic small cell carcinoma  - See comment  Adequacy: satisfactory for evaluation     B. LYMPH NODES, 7, FINE NEEDLE ASPIRATE:     Interpretation -  - Positive for malignancy, metastatic small cell carcinoma  - See comment  Adequacy: satisfactory for evaluation     C. LYMPH NODE, 4L, FINE NEEDLE ASPIRATE:     Interpretation -  - Positive for malignancy, metastatic small cell carcinoma  - See comment  Adequacy: satisfactory for evaluation     D. LEFT HILAR MASS, FINE NEEDLE ASPIRATE:     Interpretation -  - Positive for malignancy, small cell carcinoma  - See comment  Adequacy: satisfactory for evaluation   Electronically signed by Angel Hernandez MD on 7/9/2025 at 1106 CDT   Comment    Clusters of atypical cells seen in all parts with features including high nuclear to cytoplasm ratios,occasional molding, mitoses, and apoptotic debris. Immunostaining is performed with appropriate controls on cell blocks D1. Aggregate of tumor cells in specimen D are strongly and diffusely positive for synaptophysin, chromogranin, and INSM1 with a Ki-67 index of approximately 90%, supporting the diagnosis of small cell carcinoma.      IMAGING:    Recent Results (from the past 744 hours)   MA Screen Bilateral w/Orlin    Narrative    BILATERAL FULL FIELD DIGITAL SCREENING MAMMOGRAM WITH TOMOSYNTHESIS    Performed on: 6/17/25    Compared to: 04/30/2024, 09/14/2022, 09/08/2022, and 06/16/2020    Technique:  This study was evaluated with the assistance of Computer-Aided   Detection.  Breast Tomosynthesis was used in interpretation.    Findings: There are scattered areas of fibroglandular density.  There is   no radiographic evidence of malignancy.     Impression    IMPRESSION: ACR BI-RADS Category 1: Negative    BREAST CANCER SCREENING RECOMMENDATION: Routine yearly mammography   beginning at age 40 or as discussed with your provider.    The results and recommendations of  this examination will be communicated   to the patient.        Naomi Martino MD     CT Chest Lung Cancer Scrn Low Dose wo   Result Value    Radiologist flags      left hilar mass concerning for malignancy until proven    Narrative    CT Low Dose Lung Cancer Screening    History:  Personal history of nicotine dependence Screening for lung  cancer, smoking.    Number of packs-year of smokin  Current or former smoker?: Current  If former, number of years since quit?: n/a    Comparison: 2024    Technique: Helical acquisition low dose CT chest. Images reviewed in  lung, soft tissue and bone windows.  DLP: 18 (mGy*cm)  CTDIvol: 0.49 mGy)    Findings: [All follow up of nodules are based on ACR guidelines for  lung cancer screening and measurements of each nodule size must be the  mean of the longest axial plane measurement by its perpendicular  measured to the nearest decimal and rounded up to the nearest whole  number. ]  .  Nodules: .    Bulky left hilar mass encasing the left lower lobe bronchus with  conglomerate adenopathy in the left hilum also narrowing of the left  upper lobe and lingular bronchus take off. There is postobstructive  atelectasis in the left lower lobe. Superior segment of the left lower  lobe bronchus is occluded. There is gas trapping in the left lung.    Emphysema: Trace centrilobular emphysema    Coronary artery calcium: trace    Additional findings: Heart size is normal without pericardial  effusion.  No pleural effusion or pneumothorax. Esophagus is  unremarkable. Borderline adenopathy in the left peritracheal region  and AP window. Bulky left hilar adenopathy.    No acute findings on limited evaluation of the upper abdomen.  Cholecystectomy.   Cyst in the upper pole of the right kidney.    Bones: No bony or soft tissue abnormality.      Impression    Impression:   1. ACR Assessment Category ():  Lung-RADS Category 4X. Very  Suspicious.      Recommendation:  Lung-RADS  "Category 4X. Very Suspicious.  Recommendation:  Chest CT with or without contrast, PET/CT and/or  tissue sampling depending on the *probability of malignancy and  comorbidities. PET/CT may be used when there is a ? 8 mm (? 268.1 mm3)  solid component. For new large nodules that develop on an  annual repeat screening CT, a1 month LDCT may be recommended to  address  potentially infectious or inflammatory conditions     2. Significant Incidental Finding(s):  Category S: No.    3. Any moderate or severe Emphysema or bronchial wall thickening or  mosaic attenuation? No    4. Avoidance of tobacco smoke is strongly advised. Please consider for  smoking cessation to [facility] Medication Therapy Management (MTM) if  clinically appropriate.      [Access Center: left hilar mass concerning for malignancy until proven  otherwise]    This report will be copied to the [facility] to ensure a  provider acknowledges the finding. Access Center is available Monday  through Friday 8am-3:30 pm.     Download the \"LungRADS 2022 Assessment Categories\" table at this site:    [email or web address]    I have personally reviewed the examination and initial interpretation  and I agree with the findings.    MACRINA OROZCO MD         SYSTEM ID:  [id number]   MR Brain w/o & w Contrast    Narrative    EXAM: MR BRAIN W/O & W CONTRAST  7/10/2025 3:46 PM     HISTORY: new diagnosis of metastatic small cell carcinoma > assessing  for viri mets; Small cell carcinoma of lung metastatic to lymph nodes  of multiple sites (H); Small cell carcinoma of lung metastatic to  lymph nodes of multiple sites (H)       COMPARISON: 7/11/2018    TECHNIQUE: Sagittal and axial T1-weighted, axial diffusion,  multiplanar T2 FLAIR with fat saturation images were obtained without  intravenous contrast. Following intravenous gadolinium-based contrast  administration, " axial T2-weighted, axial susceptibility, and  T1-weighted images (in multiple planes) were obtained.    CONTRAST: 7 mL Gadavist.    FINDINGS:  Punctate contrast enhancing diffusion restricting lesion within the  left inferior mid brain seen on series 15 image 57. Additional focus  of enhancement along the lateral subependymal white matter of the  posterior horn of the left lateral ventricle. No mass effect, midline  shift, or intracranial hemorrhage. No abnormal enhancement within  right cerebral pontine angle. Subcortical, periventricular areas of T2  FLAIR white matter hyperintensities, mild to moderate symmetric  generalized parenchymal volume loss and compensatory ventricular  dilatation. No MRI findings of acute hydrocephalus. Preserved major  intracranial vascular flow voids.    Normal skull marrow signal. No substantial paranasal sinus mucosal  disease. Clear mastoid air cells. Nonfocal pituitary gland, sella,  skull base and upper cervical spinal structures. The orbits are  normal.      Impression    IMPRESSION:  1. New compared to prior imaging punctate 3 mm diffusion restricting,  enhancing lesion within the left inferior midbrain and in subependymal  white matter of the posterior horn of the left lateral ventricle,  concerning for metastasis. Recommend attention on follow-up.  2. Brain atrophy and leukoaraiosis, presumed sequela of chronic  microvascular ischemic disease, as detailed.  3. No abnormal enhancement within the right cerebral pontine angle to  suggest recurrence of schwannoma.    I have personally reviewed the examination and initial interpretation  and I agree with the findings.    ROSETTE NGUYEN MD         SYSTEM ID:  [id number]     MED:    Current Outpatient Medications   Medication Sig Dispense Refill    albuterol (PROAIR HFA/PROVENTIL HFA/VENTOLIN HFA) 108 (90 Base) MCG/ACT inhaler Inhale 1-2 puffs into the lungs every 4 hours as needed for shortness of breath, wheezing or cough. 18 g 1  "   amLODIPine (NORVASC) 10 MG tablet Take 1 tablet (10 mg) by mouth daily. 90 tablet 3    atorvastatin (LIPITOR) 10 MG tablet Take 1 tablet (10 mg) by mouth daily. (Patient not taking: Reported on 6/25/2025) 90 tablet 3    buPROPion (WELLBUTRIN SR) 150 MG 12 hr tablet Take 150 mg by mouth 2 times daily.      CALCIUM PO Take 300 mg by mouth daily      chlorpheniramine-pseudoePHEDrine (PSEUDO-GEST PLUS) 4-60 MG TABS Take 1 tablet by mouth every 6 hours as needed for allergies      doxycycline hyclate (VIBRAMYCIN) 100 MG capsule Take 1 capsule (100 mg) by mouth 2 times daily for 14 days. 28 capsule 0    Glucosamine-Chondroitin (GLUCOSAMINE CHONDR COMPLEX PO) Take 2,000 mg by mouth daily      lisinopril (ZESTRIL) 20 MG tablet Take 1 tablet (20 mg) by mouth daily. 90 tablet 3    [START ON 8/12/2025] nicotine (NICODERM CQ) 14 MG/24HR 24 hr patch Place 1 patch over 24 hours onto the skin every 24 hours for 14 days. 14 patch 0    nicotine (NICODERM CQ) 21 MG/24HR 24 hr patch Place 1 patch over 24 hours onto the skin every 24 hours. 42 patch 0    [START ON 8/27/2025] nicotine (NICODERM CQ) 7 MG/24HR 24 hr patch Place 1 patch over 24 hours onto the skin every 24 hours for 14 days. 14 patch 0    nicotine (NICODERM CQ) 7 MG/24HR 24 hr patch Place 1 patch over 24 hours onto the skin every 24 hours. 14 patch 0    nicotine (NICORETTE) 2 MG gum How to take it: When the urge to smoke occurs, chew gum until you feel a tingle, then \"park\" the gum in your cheek until the tingle is gone. Re-chew every few minutes and \"park\" again, chewing one piece for 30 minutes. Do not eat or drink while chewing.  Follow this schedule: Weeks 1 to 6: One piece of gum every 1 to 2 hours. Use at least 9 pieces a day, but no more than 24. Weeks 7 to 9: One piece of gum every 2 to 4 hours. Weeks 10 to 12: One piece of gum every 4 to 8 hours. 100 each 5    omeprazole (PRILOSEC) 20 MG DR capsule Take 1 capsule (20 mg) by mouth every morning 90 capsule 3    " spacer (OPTICHAMBER MARIA TERESA) holding chamber Use with albuterol inhaler 1 each 0    tirzepatide (MOUNJARO) 2.5 MG/0.5ML SOAJ auto-injector pen Inject 0.5 mLs (2.5 mg) subcutaneously once a week. (Patient not taking: No sig reported) 6 mL 0     No current facility-administered medications for this visit.      PMH:    Past Medical History:   Diagnosis Date    Acoustic neuroma (H)     Allergic rhinitis 01/1980    Dry throat     Gastro-oesophageal reflux disease     Hearing loss     Heartburn     Hypertension     Itchy eyes     Night sweats     Ringing in ears     Sneezing     Snoring     Tinnitus     Visual loss     Weight gain       PSH:    Past Surgical History:   Procedure Laterality Date    APPENDECTOMY      BRONCHOSCOPY RIGID OR FLEXIBLE W/TRANSENDOSCOPIC ENDOBRONCHIAL ULTRASOUND GUIDED N/A 7/7/2025    Procedure: BRONCHOSCOPY, flexible, endobronchial ultrasound and transbronchial needle aspiration, and cryoprobe clot extraction;  Surgeon: Candy Harris MD;  Location: [facility] OR    CHOLECYSTECTOMY      COLONOSCOPY N/A 10/10/2022    Procedure: COLONOSCOPY, FLEXIBLE, WITH LESION REMOVAL USING SNARE;  Surgeon: Penelope Garrett MD;  Location:  GI    CRANIOTOMY TRANSLABYRINTHINE, EXCISE ACOUSTIC NEUROMA (NEURO)  8/18/2014    Procedure: CRANIOTOMY TRANSLABYRINTHINE, EXCISE ACOUSTIC NEUROMA;  Surgeon: Wilfrid Garcia MD;  Location: [facility] OR    GYN SURGERY      uterine cystectomy    HERNIA REPAIR      REMOVE HARDWARE FACE Right 11/5/2015    Procedure: REMOVE HARDWARE FACE;  Surgeon: Wilfrid Garcia MD;  Location:  OR      SOC HX:    The patient is a retired  for the [employer].    Social History     Socioeconomic History    Marital status:      Spouse name: Not on file    Number of children: Not on file    Years of education: Not on file    Highest education level: Not on file   Occupational History    Not on file   Tobacco Use    Smoking status: Every Day     Current packs/day: 0.15      Average packs/day: 0.5 packs/day for 54.5 years (27.1 ttl pk-yrs)     Types: Cigarettes     Start date: 1971     Passive exposure: Current    Smokeless tobacco: Never   Vaping Use    Vaping status: Never Used   Substance and Sexual Activity    Alcohol use: Yes     Comment: occasional use- once every other week- 1-3 drinks (social)    Drug use: No    Sexual activity: Not Currently   Other Topics Concern    Parent/sibling w/ CABG, MI or angioplasty before 65F 55M? Not Asked   Social History Narrative    Not on file     Social Drivers of Health     Financial Resource Strain: Low Risk  (4/14/2025)    Financial Resource Strain     Within the past 12 months, have you or your family members you live with been unable to get utilities (heat, electricity) when it was really needed?: No   Food Insecurity: Low Risk  (4/14/2025)    Food Insecurity     Within the past 12 months, did you worry that your food would run out before you got money to buy more?: No     Within the past 12 months, did the food you bought just not last and you didn t have money to get more?: No   Transportation Needs: Low Risk  (4/14/2025)    Transportation Needs     Within the past 12 months, has lack of transportation kept you from medical appointments, getting your medicines, non-medical meetings or appointments, work, or from getting things that you need?: No   Physical Activity: Insufficiently Active (4/14/2025)    Exercise Vital Sign     Days of Exercise per Week: 4 days     Minutes of Exercise per Session: 20 min   Stress: No Stress Concern Present (4/14/2025)    [employer] - Occupational Stress Questionnaire     Feeling of Stress : Only a little   Social Connections: Unknown (4/14/2025)    Social Connection and Isolation Panel [NHANES]     Frequency of Communication with Friends and Family: Not on file     Frequency of Social Gatherings with Friends and Family: Three times a week     Attends Christianity Services: Not on  "file     Active Member of Clubs or Organizations: Not on file     Attends Club or Organization Meetings: Not on file     Marital Status: Not on file   Interpersonal Safety: Low Risk  (7/7/2025)    Interpersonal Safety     Do you feel physically and emotionally safe where you currently live?: Yes     Within the past 12 months, have you been hit, slapped, kicked or otherwise physically hurt by someone?: No     Within the past 12 months, have you been humiliated or emotionally abused in other ways by your partner or ex-partner?: No   Housing Stability: Low Risk  (4/14/2025)    Housing Stability     Do you have housing? : Yes     Are you worried about losing your housing?: No      FAM HX:    Family History   Problem Relation Age of Onset    Hypertension Mother     Cerebrovascular Disease Mother     Alzheimer Disease Mother     Depression Mother     Psychotic Disorder Mother     Dementia Mother 60    Snoring Father     Sleep Apnea Brother     Diabetes Maternal Grandmother     Diabetes Paternal Grandmother     Thyroid Disease Maternal Uncle     Breast Cancer Cousin         Breast, bone, spine, liver    Thyroid Disease Cousin       PE:    Vitals: /72   Pulse 79   Temp 99.7  F (37.6  C) (Oral)   Resp 16   Ht 1.515 m (4' 11.65\")   Wt 67.6 kg (149 lb)   LMP  (LMP Unknown)   SpO2 94%   BMI 29.45 kg/m    BMI= Body mass index is 29.45 kg/m .     HENNT:  Without nodes  Chest: Expiratory wheeze, left greater than right  Cor: NSR  Abd: Without HSM  Ext: Without rash or edema    A/P:     1) SCLC:  The patient has what appears to be limited SCLC.  However, the PET is pending.  I  will plan on scheduling to start carboplatinum and etoposide next week.  If the PET shows extensive disease I will add immunotherapy.  If the cancer is limited she is to see Dr. Farley next week as well.  I will discuss with Dr. Farley prior to that at [facility]..    2) CNS disease: The patient has a 3 mm lesion in the midbrain.  I " believe this can be re-evaluated after chemotherapy.  It is too small to characterize at the moment, and too mall to treat.    The longitudinal plan of care for the diagnosis(es)/condition(s) as documented were addressed during this visit. Due to the added complexity in care, I will continue to support Herminia in the subsequent management and with ongoing continuity of care.

## 2025-07-14 ENCOUNTER — PATIENT OUTREACH (OUTPATIENT)
Dept: ONCOLOGY | Facility: CLINIC | Age: 70
End: 2025-07-14

## 2025-07-14 ENCOUNTER — MYC REFILL (OUTPATIENT)
Dept: ONCOLOGY | Facility: CLINIC | Age: 70
End: 2025-07-14
Payer: COMMERCIAL

## 2025-07-14 ENCOUNTER — LAB (OUTPATIENT)
Dept: LAB | Facility: CLINIC | Age: 70
End: 2025-07-14
Payer: COMMERCIAL

## 2025-07-14 ENCOUNTER — ONCOLOGY VISIT (OUTPATIENT)
Dept: ONCOLOGY | Facility: CLINIC | Age: 70
End: 2025-07-14
Attending: INTERNAL MEDICINE
Payer: COMMERCIAL

## 2025-07-14 ENCOUNTER — MYC REFILL (OUTPATIENT)
Dept: FAMILY MEDICINE | Facility: CLINIC | Age: 70
End: 2025-07-14

## 2025-07-14 ENCOUNTER — PRE VISIT (OUTPATIENT)
Dept: ONCOLOGY | Facility: CLINIC | Age: 70
End: 2025-07-14
Payer: COMMERCIAL

## 2025-07-14 VITALS
HEART RATE: 79 BPM | WEIGHT: 149 LBS | SYSTOLIC BLOOD PRESSURE: 127 MMHG | OXYGEN SATURATION: 94 % | TEMPERATURE: 99.7 F | DIASTOLIC BLOOD PRESSURE: 72 MMHG | HEIGHT: 60 IN | BODY MASS INDEX: 29.25 KG/M2 | RESPIRATION RATE: 16 BRPM

## 2025-07-14 DIAGNOSIS — R06.2 WHEEZING: ICD-10-CM

## 2025-07-14 DIAGNOSIS — G47.33 OSA (OBSTRUCTIVE SLEEP APNEA): ICD-10-CM

## 2025-07-14 DIAGNOSIS — C34.90 SMALL CELL CARCINOMA OF LUNG METASTATIC TO LYMPH NODES OF MULTIPLE SITES (H): Primary | ICD-10-CM

## 2025-07-14 DIAGNOSIS — R91.8 MASS OF LEFT LUNG: ICD-10-CM

## 2025-07-14 DIAGNOSIS — C34.90 SMALL CELL CARCINOMA OF LUNG METASTATIC TO LYMPH NODES OF MULTIPLE SITES (H): ICD-10-CM

## 2025-07-14 DIAGNOSIS — C77.8 SMALL CELL CARCINOMA OF LUNG METASTATIC TO LYMPH NODES OF MULTIPLE SITES (H): Primary | ICD-10-CM

## 2025-07-14 DIAGNOSIS — Z87.891 PERSONAL HISTORY OF NICOTINE DEPENDENCE: Primary | ICD-10-CM

## 2025-07-14 DIAGNOSIS — C77.8 SMALL CELL CARCINOMA OF LUNG METASTATIC TO LYMPH NODES OF MULTIPLE SITES (H): ICD-10-CM

## 2025-07-14 DIAGNOSIS — F17.200 NICOTINE DEPENDENCE, UNCOMPLICATED, UNSPECIFIED NICOTINE PRODUCT TYPE: ICD-10-CM

## 2025-07-14 DIAGNOSIS — E11.9 TYPE 2 DIABETES MELLITUS WITHOUT COMPLICATION, WITHOUT LONG-TERM CURRENT USE OF INSULIN (H): ICD-10-CM

## 2025-07-14 LAB
ALBUMIN SERPL BCG-MCNC: 4.3 G/DL (ref 3.5–5.2)
ALP SERPL-CCNC: 100 U/L (ref 40–150)
ALT SERPL W P-5'-P-CCNC: 21 U/L (ref 0–50)
ANION GAP SERPL CALCULATED.3IONS-SCNC: 10 MMOL/L (ref 7–15)
AST SERPL W P-5'-P-CCNC: 23 U/L (ref 0–45)
BASOPHILS # BLD AUTO: 0 10E3/UL (ref 0–0.2)
BASOPHILS NFR BLD AUTO: 1 %
BILIRUB SERPL-MCNC: 0.4 MG/DL
BUN SERPL-MCNC: 15.6 MG/DL (ref 8–23)
CALCIUM SERPL-MCNC: 9.5 MG/DL (ref 8.8–10.4)
CHLORIDE SERPL-SCNC: 104 MMOL/L (ref 98–107)
CREAT SERPL-MCNC: 0.81 MG/DL (ref 0.51–0.95)
EGFRCR SERPLBLD CKD-EPI 2021: 78 ML/MIN/1.73M2
EOSINOPHIL # BLD AUTO: 0.1 10E3/UL (ref 0–0.7)
EOSINOPHIL NFR BLD AUTO: 2 %
ERYTHROCYTE [DISTWIDTH] IN BLOOD BY AUTOMATED COUNT: 14.8 % (ref 10–15)
GLUCOSE SERPL-MCNC: 85 MG/DL (ref 70–99)
HCO3 SERPL-SCNC: 27 MMOL/L (ref 22–29)
HCT VFR BLD AUTO: 46.3 % (ref 35–47)
HGB BLD-MCNC: 14.5 G/DL (ref 11.7–15.7)
IMM GRANULOCYTES # BLD: 0 10E3/UL
IMM GRANULOCYTES NFR BLD: 0 %
LYMPHOCYTES # BLD AUTO: 1.6 10E3/UL (ref 0.8–5.3)
LYMPHOCYTES NFR BLD AUTO: 25 %
MCH RBC QN AUTO: 27.2 PG (ref 26.5–33)
MCHC RBC AUTO-ENTMCNC: 31.3 G/DL (ref 31.5–36.5)
MCV RBC AUTO: 87 FL (ref 78–100)
MONOCYTES # BLD AUTO: 0.5 10E3/UL (ref 0–1.3)
MONOCYTES NFR BLD AUTO: 7 %
NEUTROPHILS # BLD AUTO: 4.1 10E3/UL (ref 1.6–8.3)
NEUTROPHILS NFR BLD AUTO: 65 %
NRBC # BLD AUTO: 0 10E3/UL
NRBC BLD AUTO-RTO: 0 /100
PLATELET # BLD AUTO: 146 10E3/UL (ref 150–450)
POTASSIUM SERPL-SCNC: 4.2 MMOL/L (ref 3.4–5.3)
PROT SERPL-MCNC: 7.3 G/DL (ref 6.4–8.3)
RBC # BLD AUTO: 5.33 10E6/UL (ref 3.8–5.2)
SODIUM SERPL-SCNC: 141 MMOL/L (ref 135–145)
WBC # BLD AUTO: 6.3 10E3/UL (ref 4–11)

## 2025-07-14 PROCEDURE — 85025 COMPLETE CBC W/AUTO DIFF WBC: CPT | Performed by: PATHOLOGY

## 2025-07-14 PROCEDURE — 36415 COLL VENOUS BLD VENIPUNCTURE: CPT | Performed by: PATHOLOGY

## 2025-07-14 PROCEDURE — 80053 COMPREHEN METABOLIC PANEL: CPT | Performed by: PATHOLOGY

## 2025-07-14 PROCEDURE — G0463 HOSPITAL OUTPT CLINIC VISIT: HCPCS | Performed by: INTERNAL MEDICINE

## 2025-07-14 RX ORDER — METHYLPREDNISOLONE SODIUM SUCCINATE 40 MG/ML
40 INJECTION INTRAMUSCULAR; INTRAVENOUS
Start: 2025-10-17

## 2025-07-14 RX ORDER — EPINEPHRINE 1 MG/ML
0.3 INJECTION, SOLUTION INTRAMUSCULAR; SUBCUTANEOUS EVERY 5 MIN PRN
OUTPATIENT
Start: 2025-08-22

## 2025-07-14 RX ORDER — METHYLPREDNISOLONE SODIUM SUCCINATE 40 MG/ML
40 INJECTION INTRAMUSCULAR; INTRAVENOUS
Start: 2025-07-25

## 2025-07-14 RX ORDER — ALBUTEROL SULFATE 0.83 MG/ML
2.5 SOLUTION RESPIRATORY (INHALATION)
OUTPATIENT
Start: 2025-09-19

## 2025-07-14 RX ORDER — PALONOSETRON 0.05 MG/ML
0.25 INJECTION, SOLUTION INTRAVENOUS ONCE
OUTPATIENT
Start: 2025-08-21

## 2025-07-14 RX ORDER — METHYLPREDNISOLONE SODIUM SUCCINATE 40 MG/ML
40 INJECTION INTRAMUSCULAR; INTRAVENOUS
Start: 2025-08-21

## 2025-07-14 RX ORDER — EPINEPHRINE 1 MG/ML
0.3 INJECTION, SOLUTION INTRAMUSCULAR; SUBCUTANEOUS EVERY 5 MIN PRN
OUTPATIENT
Start: 2025-10-16

## 2025-07-14 RX ORDER — HEPARIN SODIUM,PORCINE 10 UNIT/ML
5-20 VIAL (ML) INTRAVENOUS DAILY PRN
OUTPATIENT
Start: 2025-10-16

## 2025-07-14 RX ORDER — EPINEPHRINE 1 MG/ML
0.3 INJECTION, SOLUTION INTRAMUSCULAR; SUBCUTANEOUS EVERY 5 MIN PRN
OUTPATIENT
Start: 2025-10-17

## 2025-07-14 RX ORDER — LORAZEPAM 2 MG/ML
0.5 INJECTION INTRAMUSCULAR EVERY 4 HOURS PRN
OUTPATIENT
Start: 2025-08-22

## 2025-07-14 RX ORDER — EPINEPHRINE 1 MG/ML
0.3 INJECTION, SOLUTION INTRAMUSCULAR; SUBCUTANEOUS EVERY 5 MIN PRN
OUTPATIENT
Start: 2025-09-19

## 2025-07-14 RX ORDER — DIPHENHYDRAMINE HYDROCHLORIDE 50 MG/ML
50 INJECTION, SOLUTION INTRAMUSCULAR; INTRAVENOUS
Start: 2025-10-18

## 2025-07-14 RX ORDER — HEPARIN SODIUM,PORCINE 10 UNIT/ML
5-20 VIAL (ML) INTRAVENOUS DAILY PRN
OUTPATIENT
Start: 2025-09-18

## 2025-07-14 RX ORDER — PALONOSETRON 0.05 MG/ML
0.25 INJECTION, SOLUTION INTRAVENOUS ONCE
OUTPATIENT
Start: 2025-09-18

## 2025-07-14 RX ORDER — MEPERIDINE HYDROCHLORIDE 25 MG/ML
25 INJECTION INTRAMUSCULAR; INTRAVENOUS; SUBCUTANEOUS
OUTPATIENT
Start: 2025-10-18

## 2025-07-14 RX ORDER — PALONOSETRON 0.05 MG/ML
0.25 INJECTION, SOLUTION INTRAVENOUS ONCE
OUTPATIENT
Start: 2025-10-16

## 2025-07-14 RX ORDER — METHYLPREDNISOLONE SODIUM SUCCINATE 40 MG/ML
40 INJECTION INTRAMUSCULAR; INTRAVENOUS
Start: 2025-10-16

## 2025-07-14 RX ORDER — LORAZEPAM 2 MG/ML
0.5 INJECTION INTRAMUSCULAR EVERY 4 HOURS PRN
OUTPATIENT
Start: 2025-10-17

## 2025-07-14 RX ORDER — HEPARIN SODIUM,PORCINE 10 UNIT/ML
5-20 VIAL (ML) INTRAVENOUS DAILY PRN
OUTPATIENT
Start: 2025-08-23

## 2025-07-14 RX ORDER — HEPARIN SODIUM (PORCINE) LOCK FLUSH IV SOLN 100 UNIT/ML 100 UNIT/ML
5 SOLUTION INTRAVENOUS
OUTPATIENT
Start: 2025-08-21

## 2025-07-14 RX ORDER — PALONOSETRON 0.05 MG/ML
0.25 INJECTION, SOLUTION INTRAVENOUS ONCE
OUTPATIENT
Start: 2025-07-24

## 2025-07-14 RX ORDER — EPINEPHRINE 1 MG/ML
0.3 INJECTION, SOLUTION INTRAMUSCULAR; SUBCUTANEOUS EVERY 5 MIN PRN
OUTPATIENT
Start: 2025-09-18

## 2025-07-14 RX ORDER — ALBUTEROL SULFATE 0.83 MG/ML
2.5 SOLUTION RESPIRATORY (INHALATION)
OUTPATIENT
Start: 2025-09-20

## 2025-07-14 RX ORDER — ALBUTEROL SULFATE 90 UG/1
1-2 INHALANT RESPIRATORY (INHALATION)
Start: 2025-07-26

## 2025-07-14 RX ORDER — NICOTINE 21 MG/24HR
1 PATCH, TRANSDERMAL 24 HOURS TRANSDERMAL EVERY 24 HOURS
Qty: 42 PATCH | Refills: 0 | OUTPATIENT
Start: 2025-07-14

## 2025-07-14 RX ORDER — DIPHENHYDRAMINE HYDROCHLORIDE 50 MG/ML
50 INJECTION, SOLUTION INTRAMUSCULAR; INTRAVENOUS
Start: 2025-08-21

## 2025-07-14 RX ORDER — DIPHENHYDRAMINE HYDROCHLORIDE 50 MG/ML
50 INJECTION, SOLUTION INTRAMUSCULAR; INTRAVENOUS
Start: 2025-07-26

## 2025-07-14 RX ORDER — ALBUTEROL SULFATE 90 UG/1
1-2 INHALANT RESPIRATORY (INHALATION)
Start: 2025-09-18

## 2025-07-14 RX ORDER — LORAZEPAM 2 MG/ML
0.5 INJECTION INTRAMUSCULAR EVERY 4 HOURS PRN
OUTPATIENT
Start: 2025-09-20

## 2025-07-14 RX ORDER — ALBUTEROL SULFATE 90 UG/1
1-2 INHALANT RESPIRATORY (INHALATION)
Start: 2025-08-23

## 2025-07-14 RX ORDER — DIPHENHYDRAMINE HYDROCHLORIDE 50 MG/ML
25 INJECTION, SOLUTION INTRAMUSCULAR; INTRAVENOUS
Start: 2025-07-26

## 2025-07-14 RX ORDER — HEPARIN SODIUM,PORCINE 10 UNIT/ML
5-20 VIAL (ML) INTRAVENOUS DAILY PRN
OUTPATIENT
Start: 2025-07-25

## 2025-07-14 RX ORDER — METHYLPREDNISOLONE SODIUM SUCCINATE 40 MG/ML
40 INJECTION INTRAMUSCULAR; INTRAVENOUS
Start: 2025-09-19

## 2025-07-14 RX ORDER — MEPERIDINE HYDROCHLORIDE 25 MG/ML
25 INJECTION INTRAMUSCULAR; INTRAVENOUS; SUBCUTANEOUS
OUTPATIENT
Start: 2025-10-17

## 2025-07-14 RX ORDER — LORAZEPAM 2 MG/ML
0.5 INJECTION INTRAMUSCULAR EVERY 4 HOURS PRN
OUTPATIENT
Start: 2025-08-21

## 2025-07-14 RX ORDER — DIPHENHYDRAMINE HYDROCHLORIDE 50 MG/ML
25 INJECTION, SOLUTION INTRAMUSCULAR; INTRAVENOUS
Start: 2025-10-18

## 2025-07-14 RX ORDER — LORAZEPAM 2 MG/ML
0.5 INJECTION INTRAMUSCULAR EVERY 4 HOURS PRN
OUTPATIENT
Start: 2025-07-25

## 2025-07-14 RX ORDER — ALBUTEROL SULFATE 0.83 MG/ML
2.5 SOLUTION RESPIRATORY (INHALATION)
OUTPATIENT
Start: 2025-08-21

## 2025-07-14 RX ORDER — EPINEPHRINE 1 MG/ML
0.3 INJECTION, SOLUTION INTRAMUSCULAR; SUBCUTANEOUS EVERY 5 MIN PRN
OUTPATIENT
Start: 2025-07-25

## 2025-07-14 RX ORDER — HEPARIN SODIUM,PORCINE 10 UNIT/ML
5-20 VIAL (ML) INTRAVENOUS DAILY PRN
OUTPATIENT
Start: 2025-08-21

## 2025-07-14 RX ORDER — HEPARIN SODIUM (PORCINE) LOCK FLUSH IV SOLN 100 UNIT/ML 100 UNIT/ML
5 SOLUTION INTRAVENOUS
OUTPATIENT
Start: 2025-07-25

## 2025-07-14 RX ORDER — MEPERIDINE HYDROCHLORIDE 25 MG/ML
25 INJECTION INTRAMUSCULAR; INTRAVENOUS; SUBCUTANEOUS
OUTPATIENT
Start: 2025-07-26

## 2025-07-14 RX ORDER — DIPHENHYDRAMINE HYDROCHLORIDE 50 MG/ML
25 INJECTION, SOLUTION INTRAMUSCULAR; INTRAVENOUS
Start: 2025-08-21

## 2025-07-14 RX ORDER — DIPHENHYDRAMINE HYDROCHLORIDE 50 MG/ML
50 INJECTION, SOLUTION INTRAMUSCULAR; INTRAVENOUS
Start: 2025-08-23

## 2025-07-14 RX ORDER — MEPERIDINE HYDROCHLORIDE 25 MG/ML
25 INJECTION INTRAMUSCULAR; INTRAVENOUS; SUBCUTANEOUS
OUTPATIENT
Start: 2025-09-20

## 2025-07-14 RX ORDER — ALBUTEROL SULFATE 90 UG/1
1-2 INHALANT RESPIRATORY (INHALATION)
Start: 2025-07-25

## 2025-07-14 RX ORDER — DIPHENHYDRAMINE HYDROCHLORIDE 50 MG/ML
50 INJECTION, SOLUTION INTRAMUSCULAR; INTRAVENOUS
Start: 2025-08-22

## 2025-07-14 RX ORDER — ALBUTEROL SULFATE 0.83 MG/ML
2.5 SOLUTION RESPIRATORY (INHALATION)
OUTPATIENT
Start: 2025-07-25

## 2025-07-14 RX ORDER — HEPARIN SODIUM,PORCINE 10 UNIT/ML
5-20 VIAL (ML) INTRAVENOUS DAILY PRN
OUTPATIENT
Start: 2025-09-19

## 2025-07-14 RX ORDER — METHYLPREDNISOLONE SODIUM SUCCINATE 40 MG/ML
40 INJECTION INTRAMUSCULAR; INTRAVENOUS
Start: 2025-10-18

## 2025-07-14 RX ORDER — DIPHENHYDRAMINE HYDROCHLORIDE 50 MG/ML
25 INJECTION, SOLUTION INTRAMUSCULAR; INTRAVENOUS
Start: 2025-09-20

## 2025-07-14 RX ORDER — ALBUTEROL SULFATE 0.83 MG/ML
2.5 SOLUTION RESPIRATORY (INHALATION)
OUTPATIENT
Start: 2025-09-18

## 2025-07-14 RX ORDER — MEPERIDINE HYDROCHLORIDE 25 MG/ML
25 INJECTION INTRAMUSCULAR; INTRAVENOUS; SUBCUTANEOUS
OUTPATIENT
Start: 2025-08-21

## 2025-07-14 RX ORDER — BUPROPION HYDROCHLORIDE 150 MG/1
150 TABLET, EXTENDED RELEASE ORAL 2 TIMES DAILY
Qty: 60 TABLET | Refills: 11 | Status: SHIPPED | OUTPATIENT
Start: 2025-07-14

## 2025-07-14 RX ORDER — ALBUTEROL SULFATE 0.83 MG/ML
2.5 SOLUTION RESPIRATORY (INHALATION)
OUTPATIENT
Start: 2025-07-26

## 2025-07-14 RX ORDER — DIPHENHYDRAMINE HYDROCHLORIDE 50 MG/ML
25 INJECTION, SOLUTION INTRAMUSCULAR; INTRAVENOUS
Start: 2025-07-24

## 2025-07-14 RX ORDER — EPINEPHRINE 1 MG/ML
0.3 INJECTION, SOLUTION INTRAMUSCULAR; SUBCUTANEOUS EVERY 5 MIN PRN
OUTPATIENT
Start: 2025-10-18

## 2025-07-14 RX ORDER — HEPARIN SODIUM,PORCINE 10 UNIT/ML
5-20 VIAL (ML) INTRAVENOUS DAILY PRN
OUTPATIENT
Start: 2025-09-20

## 2025-07-14 RX ORDER — HEPARIN SODIUM (PORCINE) LOCK FLUSH IV SOLN 100 UNIT/ML 100 UNIT/ML
5 SOLUTION INTRAVENOUS
OUTPATIENT
Start: 2025-08-23

## 2025-07-14 RX ORDER — DIPHENHYDRAMINE HYDROCHLORIDE 50 MG/ML
25 INJECTION, SOLUTION INTRAMUSCULAR; INTRAVENOUS
Start: 2025-09-19

## 2025-07-14 RX ORDER — LORAZEPAM 2 MG/ML
0.5 INJECTION INTRAMUSCULAR EVERY 4 HOURS PRN
OUTPATIENT
Start: 2025-09-19

## 2025-07-14 RX ORDER — DIPHENHYDRAMINE HYDROCHLORIDE 50 MG/ML
50 INJECTION, SOLUTION INTRAMUSCULAR; INTRAVENOUS
Start: 2025-10-17

## 2025-07-14 RX ORDER — EPINEPHRINE 1 MG/ML
0.3 INJECTION, SOLUTION INTRAMUSCULAR; SUBCUTANEOUS EVERY 5 MIN PRN
OUTPATIENT
Start: 2025-08-23

## 2025-07-14 RX ORDER — HEPARIN SODIUM (PORCINE) LOCK FLUSH IV SOLN 100 UNIT/ML 100 UNIT/ML
5 SOLUTION INTRAVENOUS
OUTPATIENT
Start: 2025-10-18

## 2025-07-14 RX ORDER — DIPHENHYDRAMINE HYDROCHLORIDE 50 MG/ML
50 INJECTION, SOLUTION INTRAMUSCULAR; INTRAVENOUS
Start: 2025-09-19

## 2025-07-14 RX ORDER — DIPHENHYDRAMINE HYDROCHLORIDE 50 MG/ML
25 INJECTION, SOLUTION INTRAMUSCULAR; INTRAVENOUS
Start: 2025-09-18

## 2025-07-14 RX ORDER — HEPARIN SODIUM (PORCINE) LOCK FLUSH IV SOLN 100 UNIT/ML 100 UNIT/ML
5 SOLUTION INTRAVENOUS
OUTPATIENT
Start: 2025-09-20

## 2025-07-14 RX ORDER — DIPHENHYDRAMINE HYDROCHLORIDE 50 MG/ML
25 INJECTION, SOLUTION INTRAMUSCULAR; INTRAVENOUS
Start: 2025-07-25

## 2025-07-14 RX ORDER — HEPARIN SODIUM,PORCINE 10 UNIT/ML
5-20 VIAL (ML) INTRAVENOUS DAILY PRN
OUTPATIENT
Start: 2025-08-22

## 2025-07-14 RX ORDER — HEPARIN SODIUM,PORCINE 10 UNIT/ML
5-20 VIAL (ML) INTRAVENOUS DAILY PRN
OUTPATIENT
Start: 2025-10-17

## 2025-07-14 RX ORDER — DIPHENHYDRAMINE HYDROCHLORIDE 50 MG/ML
25 INJECTION, SOLUTION INTRAMUSCULAR; INTRAVENOUS
Start: 2025-08-23

## 2025-07-14 RX ORDER — LORAZEPAM 2 MG/ML
0.5 INJECTION INTRAMUSCULAR EVERY 4 HOURS PRN
OUTPATIENT
Start: 2025-10-16

## 2025-07-14 RX ORDER — DIPHENHYDRAMINE HYDROCHLORIDE 50 MG/ML
25 INJECTION, SOLUTION INTRAMUSCULAR; INTRAVENOUS
Start: 2025-10-17

## 2025-07-14 RX ORDER — ALBUTEROL SULFATE 0.83 MG/ML
2.5 SOLUTION RESPIRATORY (INHALATION)
OUTPATIENT
Start: 2025-10-16

## 2025-07-14 RX ORDER — DIPHENHYDRAMINE HYDROCHLORIDE 50 MG/ML
50 INJECTION, SOLUTION INTRAMUSCULAR; INTRAVENOUS
Start: 2025-07-25

## 2025-07-14 RX ORDER — ALBUTEROL SULFATE 0.83 MG/ML
2.5 SOLUTION RESPIRATORY (INHALATION)
OUTPATIENT
Start: 2025-08-22

## 2025-07-14 RX ORDER — ALBUTEROL SULFATE 0.83 MG/ML
2.5 SOLUTION RESPIRATORY (INHALATION)
OUTPATIENT
Start: 2025-10-17

## 2025-07-14 RX ORDER — DIPHENHYDRAMINE HYDROCHLORIDE 50 MG/ML
25 INJECTION, SOLUTION INTRAMUSCULAR; INTRAVENOUS
Start: 2025-10-16

## 2025-07-14 RX ORDER — LORAZEPAM 2 MG/ML
0.5 INJECTION INTRAMUSCULAR EVERY 4 HOURS PRN
OUTPATIENT
Start: 2025-07-26

## 2025-07-14 RX ORDER — MEPERIDINE HYDROCHLORIDE 25 MG/ML
25 INJECTION INTRAMUSCULAR; INTRAVENOUS; SUBCUTANEOUS
OUTPATIENT
Start: 2025-08-23

## 2025-07-14 RX ORDER — MEPERIDINE HYDROCHLORIDE 25 MG/ML
25 INJECTION INTRAMUSCULAR; INTRAVENOUS; SUBCUTANEOUS
OUTPATIENT
Start: 2025-08-22

## 2025-07-14 RX ORDER — METHYLPREDNISOLONE SODIUM SUCCINATE 40 MG/ML
40 INJECTION INTRAMUSCULAR; INTRAVENOUS
Start: 2025-09-18

## 2025-07-14 RX ORDER — LORAZEPAM 2 MG/ML
0.5 INJECTION INTRAMUSCULAR EVERY 4 HOURS PRN
OUTPATIENT
Start: 2025-09-18

## 2025-07-14 RX ORDER — HEPARIN SODIUM (PORCINE) LOCK FLUSH IV SOLN 100 UNIT/ML 100 UNIT/ML
5 SOLUTION INTRAVENOUS
OUTPATIENT
Start: 2025-10-16

## 2025-07-14 RX ORDER — ALBUTEROL SULFATE 90 UG/1
1-2 INHALANT RESPIRATORY (INHALATION)
Start: 2025-08-21

## 2025-07-14 RX ORDER — HEPARIN SODIUM,PORCINE 10 UNIT/ML
5-20 VIAL (ML) INTRAVENOUS DAILY PRN
OUTPATIENT
Start: 2025-10-18

## 2025-07-14 RX ORDER — EPINEPHRINE 1 MG/ML
0.3 INJECTION, SOLUTION INTRAMUSCULAR; SUBCUTANEOUS EVERY 5 MIN PRN
OUTPATIENT
Start: 2025-07-24

## 2025-07-14 RX ORDER — EPINEPHRINE 1 MG/ML
0.3 INJECTION, SOLUTION INTRAMUSCULAR; SUBCUTANEOUS EVERY 5 MIN PRN
OUTPATIENT
Start: 2025-08-21

## 2025-07-14 RX ORDER — HEPARIN SODIUM (PORCINE) LOCK FLUSH IV SOLN 100 UNIT/ML 100 UNIT/ML
5 SOLUTION INTRAVENOUS
OUTPATIENT
Start: 2025-09-19

## 2025-07-14 RX ORDER — HEPARIN SODIUM (PORCINE) LOCK FLUSH IV SOLN 100 UNIT/ML 100 UNIT/ML
5 SOLUTION INTRAVENOUS
OUTPATIENT
Start: 2025-09-18

## 2025-07-14 RX ORDER — EPINEPHRINE 1 MG/ML
0.3 INJECTION, SOLUTION INTRAMUSCULAR; SUBCUTANEOUS EVERY 5 MIN PRN
OUTPATIENT
Start: 2025-09-20

## 2025-07-14 RX ORDER — LORAZEPAM 2 MG/ML
0.5 INJECTION INTRAMUSCULAR EVERY 4 HOURS PRN
OUTPATIENT
Start: 2025-07-24

## 2025-07-14 RX ORDER — ALBUTEROL SULFATE 90 UG/1
1-2 INHALANT RESPIRATORY (INHALATION)
Start: 2025-07-24

## 2025-07-14 RX ORDER — ALBUTEROL SULFATE 0.83 MG/ML
2.5 SOLUTION RESPIRATORY (INHALATION)
OUTPATIENT
Start: 2025-08-23

## 2025-07-14 RX ORDER — HEPARIN SODIUM (PORCINE) LOCK FLUSH IV SOLN 100 UNIT/ML 100 UNIT/ML
5 SOLUTION INTRAVENOUS
OUTPATIENT
Start: 2025-07-26

## 2025-07-14 RX ORDER — MEPERIDINE HYDROCHLORIDE 25 MG/ML
25 INJECTION INTRAMUSCULAR; INTRAVENOUS; SUBCUTANEOUS
OUTPATIENT
Start: 2025-09-19

## 2025-07-14 RX ORDER — ALBUTEROL SULFATE 0.83 MG/ML
2.5 SOLUTION RESPIRATORY (INHALATION)
OUTPATIENT
Start: 2025-10-18

## 2025-07-14 RX ORDER — DIPHENHYDRAMINE HYDROCHLORIDE 50 MG/ML
50 INJECTION, SOLUTION INTRAMUSCULAR; INTRAVENOUS
Start: 2025-09-20

## 2025-07-14 RX ORDER — ALBUTEROL SULFATE 0.83 MG/ML
2.5 SOLUTION RESPIRATORY (INHALATION)
OUTPATIENT
Start: 2025-07-24

## 2025-07-14 RX ORDER — MEPERIDINE HYDROCHLORIDE 25 MG/ML
25 INJECTION INTRAMUSCULAR; INTRAVENOUS; SUBCUTANEOUS
OUTPATIENT
Start: 2025-09-18

## 2025-07-14 RX ORDER — METHYLPREDNISOLONE SODIUM SUCCINATE 40 MG/ML
40 INJECTION INTRAMUSCULAR; INTRAVENOUS
Start: 2025-07-24

## 2025-07-14 RX ORDER — ALBUTEROL SULFATE 90 UG/1
1-2 INHALANT RESPIRATORY (INHALATION)
Start: 2025-10-18

## 2025-07-14 RX ORDER — METHYLPREDNISOLONE SODIUM SUCCINATE 40 MG/ML
40 INJECTION INTRAMUSCULAR; INTRAVENOUS
Start: 2025-08-22

## 2025-07-14 RX ORDER — DIPHENHYDRAMINE HYDROCHLORIDE 50 MG/ML
25 INJECTION, SOLUTION INTRAMUSCULAR; INTRAVENOUS
Start: 2025-08-22

## 2025-07-14 RX ORDER — HEPARIN SODIUM (PORCINE) LOCK FLUSH IV SOLN 100 UNIT/ML 100 UNIT/ML
5 SOLUTION INTRAVENOUS
OUTPATIENT
Start: 2025-07-24

## 2025-07-14 RX ORDER — LORAZEPAM 2 MG/ML
0.5 INJECTION INTRAMUSCULAR EVERY 4 HOURS PRN
OUTPATIENT
Start: 2025-08-23

## 2025-07-14 RX ORDER — HEPARIN SODIUM (PORCINE) LOCK FLUSH IV SOLN 100 UNIT/ML 100 UNIT/ML
5 SOLUTION INTRAVENOUS
OUTPATIENT
Start: 2025-08-22

## 2025-07-14 RX ORDER — MEPERIDINE HYDROCHLORIDE 25 MG/ML
25 INJECTION INTRAMUSCULAR; INTRAVENOUS; SUBCUTANEOUS
OUTPATIENT
Start: 2025-07-24

## 2025-07-14 RX ORDER — ALBUTEROL SULFATE 90 UG/1
1-2 INHALANT RESPIRATORY (INHALATION)
Start: 2025-10-17

## 2025-07-14 RX ORDER — ALBUTEROL SULFATE 90 UG/1
1-2 INHALANT RESPIRATORY (INHALATION)
Start: 2025-09-19

## 2025-07-14 RX ORDER — METHYLPREDNISOLONE SODIUM SUCCINATE 40 MG/ML
40 INJECTION INTRAMUSCULAR; INTRAVENOUS
Start: 2025-09-20

## 2025-07-14 RX ORDER — DIPHENHYDRAMINE HYDROCHLORIDE 50 MG/ML
50 INJECTION, SOLUTION INTRAMUSCULAR; INTRAVENOUS
Start: 2025-10-16

## 2025-07-14 RX ORDER — MEPERIDINE HYDROCHLORIDE 25 MG/ML
25 INJECTION INTRAMUSCULAR; INTRAVENOUS; SUBCUTANEOUS
OUTPATIENT
Start: 2025-07-25

## 2025-07-14 RX ORDER — ALBUTEROL SULFATE 90 UG/1
1-2 INHALANT RESPIRATORY (INHALATION)
Start: 2025-10-16

## 2025-07-14 RX ORDER — LORAZEPAM 2 MG/ML
0.5 INJECTION INTRAMUSCULAR EVERY 4 HOURS PRN
OUTPATIENT
Start: 2025-10-18

## 2025-07-14 RX ORDER — METHYLPREDNISOLONE SODIUM SUCCINATE 40 MG/ML
40 INJECTION INTRAMUSCULAR; INTRAVENOUS
Start: 2025-08-23

## 2025-07-14 RX ORDER — ALBUTEROL SULFATE 90 UG/1
1-2 INHALANT RESPIRATORY (INHALATION)
Start: 2025-09-20

## 2025-07-14 RX ORDER — METHYLPREDNISOLONE SODIUM SUCCINATE 40 MG/ML
40 INJECTION INTRAMUSCULAR; INTRAVENOUS
Start: 2025-07-26

## 2025-07-14 RX ORDER — PSEUDOEPHEDRINE HCL 30 MG/1
TABLET, FILM COATED ORAL EVERY 4 HOURS PRN
COMMUNITY

## 2025-07-14 RX ORDER — MEPERIDINE HYDROCHLORIDE 25 MG/ML
25 INJECTION INTRAMUSCULAR; INTRAVENOUS; SUBCUTANEOUS
OUTPATIENT
Start: 2025-10-16

## 2025-07-14 RX ORDER — DIPHENHYDRAMINE HYDROCHLORIDE 50 MG/ML
50 INJECTION, SOLUTION INTRAMUSCULAR; INTRAVENOUS
Start: 2025-07-24

## 2025-07-14 RX ORDER — HEPARIN SODIUM,PORCINE 10 UNIT/ML
5-20 VIAL (ML) INTRAVENOUS DAILY PRN
OUTPATIENT
Start: 2025-07-24

## 2025-07-14 RX ORDER — EPINEPHRINE 1 MG/ML
0.3 INJECTION, SOLUTION INTRAMUSCULAR; SUBCUTANEOUS EVERY 5 MIN PRN
OUTPATIENT
Start: 2025-07-26

## 2025-07-14 RX ORDER — ALBUTEROL SULFATE 90 UG/1
1-2 INHALANT RESPIRATORY (INHALATION)
Start: 2025-08-22

## 2025-07-14 RX ORDER — HEPARIN SODIUM (PORCINE) LOCK FLUSH IV SOLN 100 UNIT/ML 100 UNIT/ML
5 SOLUTION INTRAVENOUS
OUTPATIENT
Start: 2025-10-17

## 2025-07-14 RX ORDER — HEPARIN SODIUM,PORCINE 10 UNIT/ML
5-20 VIAL (ML) INTRAVENOUS DAILY PRN
OUTPATIENT
Start: 2025-07-26

## 2025-07-14 RX ORDER — DIPHENHYDRAMINE HYDROCHLORIDE 50 MG/ML
50 INJECTION, SOLUTION INTRAMUSCULAR; INTRAVENOUS
Start: 2025-09-18

## 2025-07-14 ASSESSMENT — PAIN SCALES - GENERAL: PAINLEVEL_OUTOF10: NO PAIN (0)

## 2025-07-14 NOTE — LETTER
Here is your outline regarding the chemotherapy regimen that Dr. Montilla is recommending for you.  I have included handouts on each chemotherapy drug that lists the most common and possible side effects.  I have also included tip sheets on preparing yourself to start chemotherapy treatments, managing possible side effects, When to Call, important contact information for our clinic, and various resources and services for cancer patients.      Chemotherapy Regimen:   Carboplatin and Etoposide      Number of Cycles and Infusion Schedule:    TBD - I will send a MyChart with updates once Dr. Montilla enters the orders.      Take-home medications:  TBD - I will send a MyChart with updates once Dr. Montilla enters the orders.      Labs and provider visits:  You will have labs checked before each chemotherapy treatment, to ensure that it is safe for you to receive treatment that day.  You will also have a visit (either in-person or virtual) with Dr. Montilla or our Oncology Advanced Practice Provider (ARLEEN) approximately every 3-4 weeks throughout your chemotherapy treatment to assess how things are going for you.      Symptom management:  If you develop any new, acute, or worsening symptoms, please call [phone] option 5, option 2 and ask to speak to our triage team.  Simply let the person on the other line know that you need to speak with a triage nurse about the symptoms you are having. They are here to assist you during business hours and will be able to help you in real time.  For any urgent symptoms that occur after hours, weekends, and holidays, that number will get you to our on-call nurse and doctor who can assist you.  You can also ask to speak to the Triage Nurse if you need refills on your medications.      Scheduling:  You will be receiving a telephone call from a member of our scheduling team to assist you with scheduling your appointments.  If you need to cancel or reschedule an appointment, please call  [phone] option 5, option 2 and ask to speak to the scheduling team.

## 2025-07-14 NOTE — LETTER
7/14/2025      Herminia Pastor  [address] [facility] [address]  [place] [address]      Dear Colleague,    Thank you for referring your patient, Herminia Pastor, to the [facility]. Please see a copy of my visit note below.    CC:  SCLC (Left lung)    HPI: The patient is a 68 yo female with a diagnosis of SCLC. She is an ex-smoker with a 30 pack year hx of smoking.  She had been getting annual lung cancer screening but unfortunately this year she was a few months behind annual schedule.  When she had a CT it was found with suspicious findings left hilum.  She was experiencing wheezing and coughing and evaluated for acute bronchitis around the same time as her annual lung cancer screening. She had been experiencing wheezing and coughing since March, initially thought to be due to viral infection after exposure to a baby. Breathing seemed normal until the week before her EBUS when she experienced exertional dyspnea with mowing the lawn. She was still taking prednisone and doxycycline as it had helped with chest pain.     An EBUS on July 7, 2025 showed SCLC in stations 7, 4R, 4, and the left hilar mass.  Ki-67 was 90%.  The tumor was diffusely positive for diffusely positive for synaptophysin, chromogranin, and INSM1.  When I first met the patient on July 13, 2025, her PET was still pending. Brain MRI showed a 3 mm diffusion restricting, enhancing lesion within the left inferior midbrain and in subependymal white matter of the posterior horn of the left lateral ventricle, concerning for metastasis.    When I saw the patient on 7/14/25 we discussed that the cancer appeared limited although there was a 3 mm lesion in the brain.   The brain lesion was too small to characterize.  The PET wcan was pending.  I told the patient we would see her back in one week and plan on starting carboplatinum and etoposide at that time.  If there was extensive disease we would add  immunotherapy.    ROS:    The patient felt well.  She has an occasional wheeze.    LAB:    CBC RESULTS:   Recent Labs   Lab Test 04/16/25  1623   WBC 6.6   RBC 5.60*   HGB 15.4   HCT 48.8*   MCV 87   MCH 27.5   MCHC 31.6   RDW 13.2         Last Comprehensive Metabolic Panel:  Sodium   Date Value Ref Range Status   04/16/2025 144 135 - 145 mmol/L Final   08/19/2014 139 133 - 144 mmol/L Final     Potassium   Date Value Ref Range Status   04/16/2025 4.3 3.4 - 5.3 mmol/L Final   11/05/2015 3.5 3.4 - 5.3 mmol/L Final     Chloride   Date Value Ref Range Status   04/16/2025 104 98 - 107 mmol/L Final   08/19/2014 109 94 - 109 mmol/L Final     Carbon Dioxide   Date Value Ref Range Status   08/19/2014 28 20 - 32 mmol/L Final     Carbon Dioxide (CO2)   Date Value Ref Range Status   04/16/2025 27 22 - 29 mmol/L Final     Anion Gap   Date Value Ref Range Status   04/16/2025 13 7 - 15 mmol/L Final   08/19/2014 2 (L) 6 - 17 mmol/L Final     Glucose   Date Value Ref Range Status   08/19/2014 94 70 - 99 mg/dL Final     Comment:     Effective 7/30/2014, the reference range for this assay has changed to reflect   new instrumentation/methodology.       GLUCOSE BY METER POCT   Date Value Ref Range Status   07/07/2025 134 (H) 70 - 99 mg/dL Final     Urea Nitrogen   Date Value Ref Range Status   04/16/2025 15.5 8.0 - 23.0 mg/dL Final   08/19/2014 10 7 - 30 mg/dL Final     Comment:     Effective 7/30/2014, the reference range for this assay has changed to reflect   new instrumentation/methodology.       Creatinine   Date Value Ref Range Status   04/16/2025 0.72 0.51 - 0.95 mg/dL Final   11/05/2015 0.70 0.52 - 1.04 mg/dL Final     GFR Estimate   Date Value Ref Range Status   04/16/2025 90 >60 mL/min/1.73m2 Final     Comment:     eGFR calculated using 2021 CKD-EPI equation.   11/05/2015 85 >60 mL/min/1.7m2 Final     Comment:     Non  GFR Calc     Calcium   Date Value Ref Range Status   04/16/2025 10.7 (H) 8.8 - 10.4  mg/dL Final   08/19/2014 8.1 (L) 8.5 - 10.1 mg/dL Final     Comment:     Effective 7/30/2014, the reference range for this assay has changed to reflect   new instrumentation/methodology.       PATHOLOGY:    From 7/7/25 EBUS    Final Diagnosis   A. LYMPH NODE, 4R, FINE NEEDLE ASPIRATE:     Interpretation -  - Positive for malignancy, metastatic small cell carcinoma  - See comment  Adequacy: satisfactory for evaluation     B. LYMPH NODES, 7, FINE NEEDLE ASPIRATE:     Interpretation -  - Positive for malignancy, metastatic small cell carcinoma  - See comment  Adequacy: satisfactory for evaluation     C. LYMPH NODE, 4L, FINE NEEDLE ASPIRATE:     Interpretation -  - Positive for malignancy, metastatic small cell carcinoma  - See comment  Adequacy: satisfactory for evaluation     D. LEFT HILAR MASS, FINE NEEDLE ASPIRATE:     Interpretation -  - Positive for malignancy, small cell carcinoma  - See comment  Adequacy: satisfactory for evaluation   Electronically signed by Angel Hernandez MD on 7/9/2025 at 1106 CDT   Comment    Clusters of atypical cells seen in all parts with features including high nuclear to cytoplasm ratios,occasional molding, mitoses, and apoptotic debris. Immunostaining is performed with appropriate controls on cell blocks D1. Aggregate of tumor cells in specimen D are strongly and diffusely positive for synaptophysin, chromogranin, and INSM1 with a Ki-67 index of approximately 90%, supporting the diagnosis of small cell carcinoma.      IMAGING:    Recent Results (from the past 744 hours)   MA Screen Bilateral w/Orlin    Narrative    BILATERAL FULL FIELD DIGITAL SCREENING MAMMOGRAM WITH TOMOSYNTHESIS    Performed on: 6/17/25    Compared to: 04/30/2024, 09/14/2022, 09/08/2022, and 06/16/2020    Technique:  This study was evaluated with the assistance of Computer-Aided   Detection.  Breast Tomosynthesis was used in interpretation.    Findings: There are scattered areas of fibroglandular density.  There  is   no radiographic evidence of malignancy.     Impression    IMPRESSION: ACR BI-RADS Category 1: Negative    BREAST CANCER SCREENING RECOMMENDATION: Routine yearly mammography   beginning at age 40 or as discussed with your provider.    The results and recommendations of this examination will be communicated   to the patient.        Naomi Martino MD     CT Chest Lung Cancer Scrn Low Dose wo   Result Value    Radiologist flags      left hilar mass concerning for malignancy until proven    Narrative    CT Low Dose Lung Cancer Screening    History:  Personal history of nicotine dependence Screening for lung  cancer, smoking.    Number of packs-year of smokin  Current or former smoker?: Current  If former, number of years since quit?: n/a    Comparison: 2024    Technique: Helical acquisition low dose CT chest. Images reviewed in  lung, soft tissue and bone windows.  DLP: 18 (mGy*cm)  CTDIvol: 0.49 mGy)    Findings: [All follow up of nodules are based on ACR guidelines for  lung cancer screening and measurements of each nodule size must be the  mean of the longest axial plane measurement by its perpendicular  measured to the nearest decimal and rounded up to the nearest whole  number. ]  .  Nodules: .    Bulky left hilar mass encasing the left lower lobe bronchus with  conglomerate adenopathy in the left hilum also narrowing of the left  upper lobe and lingular bronchus take off. There is postobstructive  atelectasis in the left lower lobe. Superior segment of the left lower  lobe bronchus is occluded. There is gas trapping in the left lung.    Emphysema: Trace centrilobular emphysema    Coronary artery calcium: trace    Additional findings: Heart size is normal without pericardial  effusion.  No pleural effusion or pneumothorax. Esophagus is  unremarkable. Borderline adenopathy in the left peritracheal region  and AP window. Bulky left hilar adenopathy.    No acute findings on limited evaluation of  "the upper abdomen.  Cholecystectomy.   Cyst in the upper pole of the right kidney.    Bones: No bony or soft tissue abnormality.      Impression    Impression:   1. ACR Assessment Category (2022):  Lung-RADS Category 4X. Very  Suspicious.      Recommendation:  Lung-RADS Category 4X. Very Suspicious.  Recommendation:  Chest CT with or without contrast, PET/CT and/or  tissue sampling depending on the *probability of malignancy and  comorbidities. PET/CT may be used when there is a ? 8 mm (? 268.1 mm3)  solid component. For new large nodules that develop on an  annual repeat screening CT, a1 month LDCT may be recommended to  address  potentially infectious or inflammatory conditions     2. Significant Incidental Finding(s):  Category S: No.    3. Any moderate or severe Emphysema or bronchial wall thickening or  mosaic attenuation? No    4. Avoidance of tobacco smoke is strongly advised. Please consider for  smoking cessation to [facility] Medication Therapy Management (MTM) if  clinically appropriate.      [Access Center: left hilar mass concerning for malignancy until proven  otherwise]    This report will be copied to the [facility] to ensure a  provider acknowledges the finding. Access Center is available Monday  through Friday 8am-3:30 pm.     Download the \"LungRADS 2022 Assessment Categories\" table at this site:    [email or web address]    I have personally reviewed the examination and initial interpretation  and I agree with the findings.    MACRINA OROZCO MD         SYSTEM ID:  [id number]   MR Brain w/o & w Contrast    Narrative    EXAM: MR BRAIN W/O & W CONTRAST  7/10/2025 3:46 PM     HISTORY: new diagnosis of metastatic small cell carcinoma > assessing  for viri mets; Small cell carcinoma of lung metastatic to lymph nodes  of multiple sites (H); Small cell carcinoma of lung metastatic to  lymph nodes " of multiple sites (H)       COMPARISON: 7/11/2018    TECHNIQUE: Sagittal and axial T1-weighted, axial diffusion,  multiplanar T2 FLAIR with fat saturation images were obtained without  intravenous contrast. Following intravenous gadolinium-based contrast  administration, axial T2-weighted, axial susceptibility, and  T1-weighted images (in multiple planes) were obtained.    CONTRAST: 7 mL Gadavist.    FINDINGS:  Punctate contrast enhancing diffusion restricting lesion within the  left inferior mid brain seen on series 15 image 57. Additional focus  of enhancement along the lateral subependymal white matter of the  posterior horn of the left lateral ventricle. No mass effect, midline  shift, or intracranial hemorrhage. No abnormal enhancement within  right cerebral pontine angle. Subcortical, periventricular areas of T2  FLAIR white matter hyperintensities, mild to moderate symmetric  generalized parenchymal volume loss and compensatory ventricular  dilatation. No MRI findings of acute hydrocephalus. Preserved major  intracranial vascular flow voids.    Normal skull marrow signal. No substantial paranasal sinus mucosal  disease. Clear mastoid air cells. Nonfocal pituitary gland, sella,  skull base and upper cervical spinal structures. The orbits are  normal.      Impression    IMPRESSION:  1. New compared to prior imaging punctate 3 mm diffusion restricting,  enhancing lesion within the left inferior midbrain and in subependymal  white matter of the posterior horn of the left lateral ventricle,  concerning for metastasis. Recommend attention on follow-up.  2. Brain atrophy and leukoaraiosis, presumed sequela of chronic  microvascular ischemic disease, as detailed.  3. No abnormal enhancement within the right cerebral pontine angle to  suggest recurrence of schwannoma.    I have personally reviewed the examination and initial interpretation  and I agree with the findings.    ROSETTE NGUYEN MD         SYSTEM ID:   "[id number]     MED:    Current Outpatient Medications   Medication Sig Dispense Refill     albuterol (PROAIR HFA/PROVENTIL HFA/VENTOLIN HFA) 108 (90 Base) MCG/ACT inhaler Inhale 1-2 puffs into the lungs every 4 hours as needed for shortness of breath, wheezing or cough. 18 g 1     amLODIPine (NORVASC) 10 MG tablet Take 1 tablet (10 mg) by mouth daily. 90 tablet 3     atorvastatin (LIPITOR) 10 MG tablet Take 1 tablet (10 mg) by mouth daily. (Patient not taking: Reported on 6/25/2025) 90 tablet 3     buPROPion (WELLBUTRIN SR) 150 MG 12 hr tablet Take 150 mg by mouth 2 times daily.       CALCIUM PO Take 300 mg by mouth daily       chlorpheniramine-pseudoePHEDrine (PSEUDO-GEST PLUS) 4-60 MG TABS Take 1 tablet by mouth every 6 hours as needed for allergies       doxycycline hyclate (VIBRAMYCIN) 100 MG capsule Take 1 capsule (100 mg) by mouth 2 times daily for 14 days. 28 capsule 0     Glucosamine-Chondroitin (GLUCOSAMINE CHONDR COMPLEX PO) Take 2,000 mg by mouth daily       lisinopril (ZESTRIL) 20 MG tablet Take 1 tablet (20 mg) by mouth daily. 90 tablet 3     [START ON 8/12/2025] nicotine (NICODERM CQ) 14 MG/24HR 24 hr patch Place 1 patch over 24 hours onto the skin every 24 hours for 14 days. 14 patch 0     nicotine (NICODERM CQ) 21 MG/24HR 24 hr patch Place 1 patch over 24 hours onto the skin every 24 hours. 42 patch 0     [START ON 8/27/2025] nicotine (NICODERM CQ) 7 MG/24HR 24 hr patch Place 1 patch over 24 hours onto the skin every 24 hours for 14 days. 14 patch 0     nicotine (NICODERM CQ) 7 MG/24HR 24 hr patch Place 1 patch over 24 hours onto the skin every 24 hours. 14 patch 0     nicotine (NICORETTE) 2 MG gum How to take it: When the urge to smoke occurs, chew gum until you feel a tingle, then \"park\" the gum in your cheek until the tingle is gone. Re-chew every few minutes and \"park\" again, chewing one piece for 30 minutes. Do not eat or drink while chewing.  Follow this schedule: Weeks 1 to 6: One piece of gum " every 1 to 2 hours. Use at least 9 pieces a day, but no more than 24. Weeks 7 to 9: One piece of gum every 2 to 4 hours. Weeks 10 to 12: One piece of gum every 4 to 8 hours. 100 each 5     omeprazole (PRILOSEC) 20 MG DR capsule Take 1 capsule (20 mg) by mouth every morning 90 capsule 3     spacer (OPTICHAMBER MARIA TERESA) holding chamber Use with albuterol inhaler 1 each 0     tirzepatide (MOUNJARO) 2.5 MG/0.5ML SOAJ auto-injector pen Inject 0.5 mLs (2.5 mg) subcutaneously once a week. (Patient not taking: No sig reported) 6 mL 0     No current facility-administered medications for this visit.      PMH:    Past Medical History:   Diagnosis Date     Acoustic neuroma (H)      Allergic rhinitis 01/1980     Dry throat      Gastro-oesophageal reflux disease      Hearing loss      Heartburn      Hypertension      Itchy eyes      Night sweats      Ringing in ears      Sneezing      Snoring      Tinnitus      Visual loss      Weight gain       PSH:    Past Surgical History:   Procedure Laterality Date     APPENDECTOMY       BRONCHOSCOPY RIGID OR FLEXIBLE W/TRANSENDOSCOPIC ENDOBRONCHIAL ULTRASOUND GUIDED N/A 7/7/2025    Procedure: BRONCHOSCOPY, flexible, endobronchial ultrasound and transbronchial needle aspiration, and cryoprobe clot extraction;  Surgeon: Candy Harris MD;  [facility]: [facility]     CHOLECYSTECTOMY       COLONOSCOPY N/A 10/10/2022    Procedure: COLONOSCOPY, FLEXIBLE, WITH LESION REMOVAL USING SNARE;  Surgeon: Penelope Garrett MD;  Location:  GI     CRANIOTOMY TRANSLABYRINTHINE, EXCISE ACOUSTIC NEUROMA (NEURO)  8/18/2014    Procedure: CRANIOTOMY TRANSLABYRINTHINE, EXCISE ACOUSTIC NEUROMA;  Surgeon: Wilfrid Garcia MD;  [facility]: [facility]     GYN SURGERY      uterine cystectomy     HERNIA REPAIR       REMOVE HARDWARE FACE Right 11/5/2015    Procedure: REMOVE HARDWARE FACE;  Surgeon: Wilfrid Garcia MD;  Location: [facility]      SOC HX:    The patient is a retired  for the [employer].    Social  History     Socioeconomic History     Marital status:      Spouse name: Not on file     Number of children: Not on file     Years of education: Not on file     Highest education level: Not on file   Occupational History     Not on file   Tobacco Use     Smoking status: Every Day     Current packs/day: 0.15     Average packs/day: 0.5 packs/day for 54.5 years (27.1 ttl pk-yrs)     Types: Cigarettes     Start date: 1971     Passive exposure: Current     Smokeless tobacco: Never   Vaping Use     Vaping status: Never Used   Substance and Sexual Activity     Alcohol use: Yes     Comment: occasional use- once every other week- 1-3 drinks (social)     Drug use: No     Sexual activity: Not Currently   Other Topics Concern     Parent/sibling w/ CABG, MI or angioplasty before 65F 55M? Not Asked   Social History Narrative     Not on file     Social Drivers of Health     Financial Resource Strain: Low Risk  (4/14/2025)    Financial Resource Strain      Within the past 12 months, have you or your family members you live with been unable to get utilities (heat, electricity) when it was really needed?: No   Food Insecurity: Low Risk  (4/14/2025)    Food Insecurity      Within the past 12 months, did you worry that your food would run out before you got money to buy more?: No      Within the past 12 months, did the food you bought just not last and you didn t have money to get more?: No   Transportation Needs: Low Risk  (4/14/2025)    Transportation Needs      Within the past 12 months, has lack of transportation kept you from medical appointments, getting your medicines, non-medical meetings or appointments, work, or from getting things that you need?: No   Physical Activity: Insufficiently Active (4/14/2025)    Exercise Vital Sign      Days of Exercise per Week: 4 days      Minutes of Exercise per Session: 20 min   Stress: No Stress Concern Present (4/14/2025)    [employer] [place] [employer] - Occupational  "Stress Questionnaire      Feeling of Stress : Only a little   Social Connections: Unknown (4/14/2025)    Social Connection and Isolation Panel [NHANES]      Frequency of Communication with Friends and Family: Not on file      Frequency of Social Gatherings with Friends and Family: Three times a week      Attends Church Services: Not on file      Active Member of Clubs or Organizations: Not on file      Attends Club or Organization Meetings: Not on file      Marital Status: Not on file   Interpersonal Safety: Low Risk  (7/7/2025)    Interpersonal Safety      Do you feel physically and emotionally safe where you currently live?: Yes      Within the past 12 months, have you been hit, slapped, kicked or otherwise physically hurt by someone?: No      Within the past 12 months, have you been humiliated or emotionally abused in other ways by your partner or ex-partner?: No   Housing Stability: Low Risk  (4/14/2025)    Housing Stability      Do you have housing? : Yes      Are you worried about losing your housing?: No      FAM HX:    Family History   Problem Relation Age of Onset     Hypertension Mother      Cerebrovascular Disease Mother      Alzheimer Disease Mother      Depression Mother      Psychotic Disorder Mother      Dementia Mother 60     Snoring Father      Sleep Apnea Brother      Diabetes Maternal Grandmother      Diabetes Paternal Grandmother      Thyroid Disease Maternal Uncle      Breast Cancer Cousin         Breast, bone, spine, liver     Thyroid Disease Cousin       PE:    Vitals: /72   Pulse 79   Temp 99.7  F (37.6  C) (Oral)   Resp 16   Ht 1.515 m (4' 11.65\")   Wt 67.6 kg (149 lb)   LMP  (LMP Unknown)   SpO2 94%   BMI 29.45 kg/m    BMI= Body mass index is 29.45 kg/m .     HENNT:  Without nodes  Chest: Expiratory wheeze, left greater than right  Cor: NSR  Abd: Without HSM  Ext: Without rash or edema    A/P:     1) SCLC:  The patient has what appears to be limited SCLC.  However, the PET " is pending.  I  will plan on scheduling to start carboplatinum and etoposide next week.  If the PET shows extensive disease I will add immunotherapy.  If the cancer is limited she is to see Dr. Farley next week as well.  I will discuss with Dr. Farley prior to that at [facility]..    2) CNS disease: The patient has a 3 mm lesion in the midbrain.  I believe this can be re-evaluated after chemotherapy.  It is too small to characterize at the moment, and too mall to treat.    The longitudinal plan of care for the diagnosis(es)/condition(s) as documented were addressed during this visit. Due to the added complexity in care, I will continue to support Herminia in the subsequent management and with ongoing continuity of care.     Again, thank you for allowing me to participate in the care of your patient.        Sincerely,        Fausto Montilla MD    Electronically signed

## 2025-07-14 NOTE — PROGRESS NOTES
[facility]: Cancer Care Initial Note                                    Discussion with Patient:                                                      Met patient and family members in clinic.  Introduced self and role of RN Care Coordinator at [facility].  Provided contact information, [facility] phone number (which has options to talk with a Triage Nurse available 24/7 - Triage and RNCC via this option during business hours).     Reviewed current clinic flow including telemedicine visits and RNCCs working remotely at varying intervals.  Reviewed appropriate use of MyChart and reinforced to not send symptoms through MyChart.      Reviewed [facility] care team members including midlevel providers in oncology dept and Dr. Montilla's usual clinic hours.    Patient voiced understanding and appreciation of above information and denies any further questions, and patient understands that RNCC will follow and provide coordination as needed.    Provided patient with [facility] patient education on carboplatin and etoposide and resources available at the [facility].  Education includes administration, side effects, and ongoing symptom management by APPs in clinic.  Provided phone numbers for triage and after hours care and when to call clinic.  Highlighted steps to expect when getting treatment (check in, labs, pre-meds, infusion).  Education includes treatment may be delayed a day or two due to blood counts, infusion schedule, side effects, medications or patient's need to modify.     Goals          General     Other (pt-stated)      Notes - Note created  7/14/2025  3:28 PM by Dariela Moore, LATA     Goal Statement: I will use my clinic and care team resources as directed.  Date Goal set: 07/14/25   Barriers: No barriers identified  Strengths: support, coping, motivation, health awareness, and involvement with care team  Date to Achieve By: ongoing  Patient expressed understanding of  goal: Yes  Action steps to achieve this goal:  I will contact triage with new, worsening or uncontrolled symptoms.   I will contact triage with temperature over 100.4  I will call with difficulties of scheduling and/or transportation.   I will request needed refills when there are 7 days of medication remaining.   I will not send urgent or symptomatic messages through [employer]   I will contact scheduling to arrange or make changes in my appointments.   Patient will contact clinic and RNCC as needed ongoing.              Assessment:                                                      Initial  Current living arrangement:: I live in a private home  Type of residence:: Private home - stairs  Informal Support system:: Children, Family  Equipment Currently Used at Home: none  Bed or wheelchair confined:: No  Mobility Status: Independent  Transportation means:: Regular car  Medication adherence problem (GOAL):: No  Knowledgeable about how to use meds:: Yes  Medication side effects suspected:: No  Referrals Placed: None  Pain Score: No Pain (0)    Plan of Care Education Review:   Assessment completed with:: Patient, Children, Family    Plan of Care Education   Yearly learning assessment completed?: Yes (see Education tab)  Diagnosis:: small cell carcinoma of lung  Does patient understand diagnosis?: Yes  Tx plan/regimen:: carboplatin and etoposide  Does patient understand treatment plan/regimen?: Yes  Preparing for treatment:: Reviewed treatment preparation information with patient (vascular access, day of chemo, visitor policy, what to bring, etc.)  Vascular access education provided for:: Peripheral IV  Side effect education:: Diarrhea/Constipation, DVT/PE, Endocrine effects, Fatigue, Hair loss, Immune-mediated effects, Infection, Lab value monitoring (anemia, neutropenia, thrombocytopenia), Mouth sores, Mylosuppression, Nausea/Vomiting, Neuropathy, Sexual health, Skin changes, Urinary  Safety/self care at home reviewed  with patient:: Yes  Coping - concerns/fears reviewed with patient:: Yes  Plan of Care:: ARLEEN follow-up appointment, Lab appointment, Imaging, MD follow-up appointment, Treatment schedule  When to call provider:: Bleeding, Increased shortness of breath, New/worsening pain, Shaking chills, Temperature >100.4F, Uncontrolled diarrhea/constipation, Uncontrolled nausea/vomiting    Evaluation of Learning  Patient Education Provided: Yes  Readiness:: Acceptance  Method:: Booklet/Handout, Literature, Explanation  Response:: Verbalizes understanding         Intervention/Education provided during outreach:                                                       Patient to follow up as scheduled at next appt  Patient to call/[employer] message with updates  Confirmed patient has clinic and triage numbers    Dariela BOUCHER RN  Cancer Care Coordinator  [facility]

## 2025-07-14 NOTE — TELEPHONE ENCOUNTER
TIRZEPATIDE: Clinic RN: Please investigate patient's chart or contact patient if the information cannot be found because the last prescription was a taper dose (not in RN protocol). We need to know what dose patient is taking. Determine the current dose, then route to provider and ask provider to update the SIG and approve or deny the prescription.    Dominique RAMOS RN

## 2025-07-14 NOTE — LETTER
7/14/2025      Herminia Pastor  [address]  [place] [address]      Sedrick Hope,    My name is Dariela and I am the RN Care Coordinator who works with Dr. Montilla's patients.  I am here to help support your oncology care, provide communication, and answer questions between your appointments with Dr. Montilla.      The best way to get a hold of me is through [employer].  I typically respond within the same day or by the following business day.  You will select Dr. Montilla when choosing to send a message and the message will be routed to me.      The main phone number for the [facility] is [phone] option 5, option 2.      - Ask to speak to the scheduling team if you need to: schedule, reschedule, or cancel appointments, or if you have questions about your appointments.    - Ask to speak to the triage nursing team if: you are having any new, acute, or worsening symptoms.  They are here to assist you 24/7 and will be able to help you in real time.  You can also ask to speak to the triage nurse if you need refills on your medications.    - Ask to speak to me (Dariela) if: you have general, non-urgent, questions about your care.  If you get my confidential voicemail, you can leave a detailed message.  I typically respond within 1-2 business days.      Dariela RN Care Coordinator  [facility]  [address] [place] [id number]  [phone] Option 5, Option 2

## 2025-07-14 NOTE — NURSING NOTE
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    Weight as of this encounter: 67.6 kg (149 lb). Body surface area is 1.69 meters squared.  No Pain (0) Comment: Data Unavailable   No LMP recorded (lmp unknown). Patient is postmenopausal.  Allergies reviewed: No  Medications reviewed: No    Medications: Medication refills not needed today.  Pharmacy name entered into [facility]:    [id number] - [place], [place] - [address] [place] [address] [place] [address] [facility] [place] [address] [facility] - [place], [place] [address] [facility] - [place] - [address] [id number] - [place], [place] - [address] [facility] [address] [facility] [address] [facility] [address] [place], [place] - [address]     PHQ9:  Did this patient require a PHQ9?: No      Clinical concerns:  New pt consult.      Swati Gordillo CMA              "

## 2025-07-14 NOTE — TELEPHONE ENCOUNTER
Refused Prescriptions:                       Disp   Refills    nicotine (NICODERM CQ) 21 MG/24HR 24 hr pa*42 pat*0        Sig: Place 1 patch over 24 hours onto the skin every 24           hours.    Has refills/orders on file already. Message sent to patient to contact pharmacy directly.     Ayanna Wynn RN on 7/14/2025 at 10:52 AM

## 2025-07-16 DIAGNOSIS — Z51.11 ENCOUNTER FOR ANTINEOPLASTIC CHEMOTHERAPY: Primary | ICD-10-CM

## 2025-07-17 ENCOUNTER — PRE VISIT (OUTPATIENT)
Dept: RADIATION ONCOLOGY | Facility: CLINIC | Age: 70
End: 2025-07-17

## 2025-07-20 NOTE — PROGRESS NOTES
CC:  Extensive SCLC (Left lung)     HPI: The patient is a 70 yo female with a diagnosis of SCLC. She is an ex-smoker with a 30 pack year hx of smoking.  She had been getting annual lung cancer screening but unfortunately this year she was a few months behind annual schedule.  When she had a CT it was found with suspicious findings left hilum.  She was experiencing wheezing and coughing and evaluated for acute bronchitis around the same time as her annual lung cancer screening. She had been experiencing wheezing and coughing since March, initially thought to be due to viral infection after exposure to a baby. Breathing seemed normal until the week before her EBUS when she experienced exertional dyspnea with mowing the lawn. She was still taking prednisone and doxycycline as it had helped with chest pain.      An EBUS on July 7, 2025 showed SCLC in stations 7, 4R, 4, and the left hilar mass.  Ki-67 was 90%.  The tumor was diffusely positive for diffusely positive for synaptophysin, chromogranin, and INSM1.  When I first met the patient on July 13, 2025, her PET was still pending. Brain MRI showed a 3 mm diffusion restricting, enhancing lesion within the left inferior midbrain and in subependymal white matter of the posterior horn of the left lateral ventricle, concerning for metastasis.     When I saw the patient on 7/14/25 we discussed that the cancer appeared limited although there was a 3 mm lesion in the brain.   The brain lesion was too small to characterize.  The PET wcan was pending.  I told the patient we would see her back in one week and plan on starting carboplatinum and etoposide at that time.  If there was extensive disease we would add immunotherapy.    PET scan done on 7/17/25 showed FDG multifocal metastases in the liver and bones (left femur and left humerus). There was also a possible cervical node.  I discussed at length that the patient had extensive SCLC and that we would add chemotherapy with  Tecentriq and forego concommitant XRT.  I discussed this with Dr. Farley.  We will plan on a restaging PET after 4 cycles of therapy.    ROS:    As above.  The patient occasionally has coughing fits.  I explained that these would likely improve with therapy.    LAB:    Labs are adequate for treatment.    CBC RESULTS:   Recent Labs   Lab Test 07/14/25  1544   WBC 6.3   RBC 5.33*   HGB 14.5   HCT 46.3   MCV 87   MCH 27.2   MCHC 31.3*   RDW 14.8   *      Last Comprehensive Metabolic Panel:  Sodium   Date Value Ref Range Status   07/14/2025 141 135 - 145 mmol/L Final   08/19/2014 139 133 - 144 mmol/L Final     Potassium   Date Value Ref Range Status   07/14/2025 4.2 3.4 - 5.3 mmol/L Final   11/05/2015 3.5 3.4 - 5.3 mmol/L Final     Chloride   Date Value Ref Range Status   07/14/2025 104 98 - 107 mmol/L Final   08/19/2014 109 94 - 109 mmol/L Final     Carbon Dioxide   Date Value Ref Range Status   08/19/2014 28 20 - 32 mmol/L Final     Carbon Dioxide (CO2)   Date Value Ref Range Status   07/14/2025 27 22 - 29 mmol/L Final     Anion Gap   Date Value Ref Range Status   07/14/2025 10 7 - 15 mmol/L Final   08/19/2014 2 (L) 6 - 17 mmol/L Final     Glucose   Date Value Ref Range Status   07/17/2025 120 (A) 70 - 99 mg/dL Final     Urea Nitrogen   Date Value Ref Range Status   07/14/2025 15.6 8.0 - 23.0 mg/dL Final   08/19/2014 10 7 - 30 mg/dL Final     Comment:     Effective 7/30/2014, the reference range for this assay has changed to reflect   new instrumentation/methodology.       Creatinine   Date Value Ref Range Status   07/14/2025 0.81 0.51 - 0.95 mg/dL Final   11/05/2015 0.70 0.52 - 1.04 mg/dL Final     GFR Estimate   Date Value Ref Range Status   07/14/2025 78 >60 mL/min/1.73m2 Final     Comment:     eGFR calculated using 2021 CKD-EPI equation.   11/05/2015 85 >60 mL/min/1.7m2 Final     Comment:     Non  GFR Calc     Calcium   Date Value Ref Range Status   07/14/2025 9.5 8.8 - 10.4 mg/dL Final    2014 8.1 (L) 8.5 - 10.1 mg/dL Final     Comment:     Effective 2014, the reference range for this assay has changed to reflect   new instrumentation/methodology.       Bilirubin Total   Date Value Ref Range Status   2025 0.4 <=1.2 mg/dL Final     Alkaline Phosphatase   Date Value Ref Range Status   2025 100 40 - 150 U/L Final     ALT   Date Value Ref Range Status   2025 21 0 - 50 U/L Final     AST   Date Value Ref Range Status   2025 23 0 - 45 U/L Final     IMAGING:    Recent Results (from the past 744 hours)   CT Chest Lung Cancer Scrn Low Dose wo   Result Value    Radiologist flags      left hilar mass concerning for malignancy until proven    Narrative    CT Low Dose Lung Cancer Screening    History:  Personal history of nicotine dependence Screening for lung  cancer, smoking.    Number of packs-year of smokin  Current or former smoker?: Current  If former, number of years since quit?: n/a    Comparison: 2024    Technique: Helical acquisition low dose CT chest. Images reviewed in  lung, soft tissue and bone windows.  DLP: 18 (mGy*cm)  CTDIvol: 0.49 mGy)    Findings: [All follow up of nodules are based on ACR guidelines for  lung cancer screening and measurements of each nodule size must be the  mean of the longest axial plane measurement by its perpendicular  measured to the nearest decimal and rounded up to the nearest whole  number. ]  .  Nodules: .    Bulky left hilar mass encasing the left lower lobe bronchus with  conglomerate adenopathy in the left hilum also narrowing of the left  upper lobe and lingular bronchus take off. There is postobstructive  atelectasis in the left lower lobe. Superior segment of the left lower  lobe bronchus is occluded. There is gas trapping in the left lung.    Emphysema: Trace centrilobular emphysema    Coronary artery calcium: trace    Additional findings: Heart size is normal without pericardial  effusion.  No pleural effusion or  "pneumothorax. Esophagus is  unremarkable. Borderline adenopathy in the left peritracheal region  and AP window. Bulky left hilar adenopathy.    No acute findings on limited evaluation of the upper abdomen.  Cholecystectomy.   Cyst in the upper pole of the right kidney.    Bones: No bony or soft tissue abnormality.      Impression    Impression:   1. ACR Assessment Category (2022):  Lung-RADS Category 4X. Very  Suspicious.      Recommendation:  Lung-RADS Category 4X. Very Suspicious.  Recommendation:  Chest CT with or without contrast, PET/CT and/or  tissue sampling depending on the *probability of malignancy and  comorbidities. PET/CT may be used when there is a ? 8 mm (? 268.1 mm3)  solid component. For new large nodules that develop on an  annual repeat screening CT, a1 month LDCT may be recommended to  address  potentially infectious or inflammatory conditions     2. Significant Incidental Finding(s):  Category S: No.    3. Any moderate or severe Emphysema or bronchial wall thickening or  mosaic attenuation? No    4. Avoidance of tobacco smoke is strongly advised. Please consider for  smoking cessation to [facility] Medication Therapy Management (MTM) if  clinically appropriate.      [Access Center: left hilar mass concerning for malignancy until proven  otherwise]    This report will be copied to the [place] [facility] to ensure a  provider acknowledges the finding. Access Center is available Monday  through Friday 8am-3:30 pm.     Download the \"LungRADS 2022 Assessment Categories\" table at this site:    [email or web address]    I have personally reviewed the examination and initial interpretation  and I agree with the findings.    MACRINA OROZCO MD         SYSTEM ID:  [id number]   MR Brain w/o & w Contrast    Narrative    EXAM: MR BRAIN W/O & W CONTRAST  7/10/2025 3:46 PM     HISTORY: new diagnosis of metastatic " small cell carcinoma > assessing  for viri mets; Small cell carcinoma of lung metastatic to lymph nodes  of multiple sites (H); Small cell carcinoma of lung metastatic to  lymph nodes of multiple sites (H)       COMPARISON: 7/11/2018    TECHNIQUE: Sagittal and axial T1-weighted, axial diffusion,  multiplanar T2 FLAIR with fat saturation images were obtained without  intravenous contrast. Following intravenous gadolinium-based contrast  administration, axial T2-weighted, axial susceptibility, and  T1-weighted images (in multiple planes) were obtained.    CONTRAST: 7 mL Gadavist.    FINDINGS:  Punctate contrast enhancing diffusion restricting lesion within the  left inferior mid brain seen on series 15 image 57. Additional focus  of enhancement along the lateral subependymal white matter of the  posterior horn of the left lateral ventricle. No mass effect, midline  shift, or intracranial hemorrhage. No abnormal enhancement within  right cerebral pontine angle. Subcortical, periventricular areas of T2  FLAIR white matter hyperintensities, mild to moderate symmetric  generalized parenchymal volume loss and compensatory ventricular  dilatation. No MRI findings of acute hydrocephalus. Preserved major  intracranial vascular flow voids.    Normal skull marrow signal. No substantial paranasal sinus mucosal  disease. Clear mastoid air cells. Nonfocal pituitary gland, sella,  skull base and upper cervical spinal structures. The orbits are  normal.      Impression    IMPRESSION:  1. New compared to prior imaging punctate 3 mm diffusion restricting,  enhancing lesion within the left inferior midbrain and in subependymal  white matter of the posterior horn of the left lateral ventricle,  concerning for metastasis. Recommend attention on follow-up.  2. Brain atrophy and leukoaraiosis, presumed sequela of chronic  microvascular ischemic disease, as detailed.  3. No abnormal enhancement within the right cerebral pontine angle  to  suggest recurrence of schwannoma.    I have personally reviewed the examination and initial interpretation  and I agree with the findings.    ROSETTE NGUYEN MD         SYSTEM ID:  [id number]   PET Oncology Whole Body    Narrative    Combined Report of: PET and CT on 7/17/2025 9:56 AM:     FOLLOW-UP APPOINTMENT: 7/21/2025    1. PET of the neck, chest, abdomen, and pelvis.  2. PET CT Fusion for Attenuation Correction and Anatomical  Localization.  3. Diagnostic CT of the chest, abdomen and pelvis with intravenous  contrast obtained for diagnostic interpretation.  4. 3D MIP and PET-CT fused images were processed on an independent  workstation and archived to PACS and reviewed by a radiologist.    Technique:    1. PET: The patient received 14.8 mCi of F-18-FDG. The serum glucose  was 120 mg/dL prior to administration. Body weight was 67.6 kg. Images  were evaluated in the axial, sagittal, and coronal planes as well as  the rotational whole body MIP. Images were acquired from cranial  vertex to feet.    UPTAKE WAS MEASURED AT 60 MINUTES.     2. CT: Volumetric acquisition for clinical interpretation of the  chest, abdomen, and pelvis acquired at 3 mm sections. The chest,  abdomen, and pelvis were evaluated at 5 mm sections in bone, soft  tissue, and lung windows.    Contrast and Medications:  IV contrast: 89 mL of Isovue 370 intravenously.  PO contrast: None.  Additional Medications: None.    3. 3D MIP and PET-CT fused images were processed on an independent  workstation and archived to PACS and reviewed by a radiologist.    INDICATION: new diagnosis of metastatic small cell carcinoma >  assessing for extrathoracic mets; Small cell carcinoma of lung  metastatic to lymph nodes of multiple sites (H); Small cell carcinoma  of lung metastatic to lymph nodes of multiple sites (H).    ADDITIONAL INFORMATION OBTAINED FROM EMR: 69-year-old with newly  diagnosed small cell lung carcinoma via endobronchial biopsy, brain  MRI  with 3 mm foci concerning for metastases. PET/CT for staging.    COMPARISON: CT chest 6/25/2025.    FINDINGS:     BACKGROUND: Liver SUV max = 3.7, Aorta Blood SUV max = 2.9.     HEAD/NECK:    Lymph nodes: There is a moderately hypermetabolic right level 2A  cervical lymph node that measures 1.3 x 1.0 cm (series 2, image 116).    Symmetric moderate to intensity of the tonsils and of the time.  Moderate symmetric uptake of the vocal folds, likely secondary to  muscle activation. Mild to moderate uptake in the extraocular muscles  bilaterally, likely secondary to muscle activation.     The mucosal and deep spaces of the neck are otherwise unremarkable.  The major salivary glands are unremarkable. The thyroid is  unremarkable. The major vasculature of the neck are patent.    The paranasal sinuses are clear. The mastoid air cells are clear. Mild  mandibular torus.     CHEST:    Lymph nodes: Intensely avid confluent mediastinal lymphadenopathy  encasing the left mainstem bronchus and the right pulmonary artery  with an SUV max of 16.8 There are additional discrete subcarinal and  periaortic hypermetabolic lymph nodes, for example a subcarinal node  with an SUV max of 11.2, measuring up to 1.4 cm (series 2, image 171).    There are multiple borderline enlarged axillary lymph nodes  bilaterally which demonstrate an FDG of avidity lower than the  mediastinal blood pool.    Lungs: There is a large left hilar hypermetabolic mass (SUV max 15.7)  centered in the left lower lobe with extension into the left upper  lobe along the hilum and into the mediastinum. Narrowing of the left  mainstem bronchus with almost complete obstruction of the left lower  lobe segmental and subsegmental bronchi as well as the left upper lobe  subsegmental bronchus.  Mild atelectasis in the left lower lobe,  improved when compared to prior. Trace centrilobular emphysematous  change.  No pleural effusion or pneumothorax.     Heart and great vessels:  Heart size is within normal limits. No  pericardial effusion. The thoracic aorta and main pulmonary artery are  within normal limits. The esophagus is unremarkable.     Breasts: No abnormal uptake in the breasts.    ABDOMEN AND PELVIS:    Liver: Multiple hypoattenuating and hypermetabolic lesions within the  liver in hepatic segments II, Jose, VI, and VII. The most  hypermetabolic lesions include a 1.6 x 1.5 cm lesion in hepatic  segment Jose with an SUV max of 10.9 (series 2, image 228) and 1.7 x  1.3 cm lesion in hepatic segment VII with an SUV max of 7.5) series 2,  image 223). No intrahepatic or extrahepatic biliary ductal dilatation.  Postoperative changes of cholecystectomy.     Pancreas: The pancreas is within normal limits. No pancreatic ductal  dilatation.     Spleen: No FDG avid lesion.    Adrenal glands: Mild uptake in the adrenal glands without focal  nodule.    Kidneys: No FDG avid lesion. The upper pole of the right kidney there  is a 2.1 cm indeterminate hypoattenuating lesion which is not FDG  avid. Multiple additional hypoattenuating benign simple cysts.  Additional subcentimeter hypoattenuating lesions are too small to  accurately characterize. No hydronephrosis. The urinary bladder is  unremarkable.     Calcifications within a likely uterine fibroid.    Gastrointestinal system: Normal caliber of the small and large bowel.     Lymph nodes: No FDG avid or abnormally enlarged lymph nodes.    Vascular structures: Normal caliber of the abdominal aorta. Scattered  atherosclerotic calcification.    No free air, free fluid, or fluid collection. Tiny fat-containing  umbilical hernia.     EXTREMITIES:     No abnormal FDG uptake in the visualized extremities.    EXTREMITIES, BONES AND SOFT TISSUES:     Area of moderate focal uptake within a sclerotic appearing 1.0 cm  density in the medulla of the left proximal femoral shaft (series 2,  image 421) an SUV max of 6.4. There is an additional  mildly  hypermetabolic sclerotic 0.9 cm focus in the medulla of the mid left  humeral shaft (series 2, image 82) with an SUV max of 3.8.     Mild periarticular uptake surrounding the shoulders, hips, and knees  which is favored degenerative.    Focal uptake in the perineum which is favored to represent  contamination.     SPINAL CANAL:     No evident canal compromise. No abnormal FDG avid lesion. Sclerotic  focus in the L5 vertebral body without abnormal FDG uptake.    Context: 69 year old with recently diagnosed small cell carcinoma      Impression    IMPRESSION: Summary: hypermetabolic left lower lobe/hilum small cell  carcinoma with mets to mediastinal nodes, liver, bone.     1. Extensive small cell lung carcinoma with a primary lesion in the  left lower lobe/ hilum with probable lymphangitic carcinomatosis in  the left lobe. The left hilar mass extends into the mediastinum and  encases the left main bronchus and pulmonary artery with near-complete  obstruction of multiple segmental and subsegmental bronchi.    2. Distant mets - multifocal metastases in the liver and bones (left  femur and left humerus).     3. There is an additional moderately hypermetabolic right level 2A  cervical lymph node which is concerning for illiana metastasis.     4. Indeterminate cystic lesion in the upper pole of the right kidney  which is not FDG avid. This may represent a proteinaceous/hemorrhagic  cyst, if further characterization is clinically desired, consider MRI  or multiphase CT.    5. Brain lesion - better demonstrated on MRI.      I have personally reviewed the examination and initial interpretation  and I agree with the findings.    JOHN METZGER MD         SYSTEM ID:  [id number]      MED:    Current Outpatient Medications   Medication Sig Dispense Refill    albuterol (PROAIR HFA/PROVENTIL HFA/VENTOLIN HFA) 108 (90 Base) MCG/ACT inhaler Inhale 1-2 puffs into the lungs every 4 hours as needed for shortness of breath,  "wheezing or cough. 18 g 1    amLODIPine (NORVASC) 10 MG tablet Take 1 tablet (10 mg) by mouth daily. 90 tablet 3    atorvastatin (LIPITOR) 10 MG tablet Take 1 tablet (10 mg) by mouth daily. (Patient not taking: Reported on 7/14/2025) 90 tablet 3    buPROPion (WELLBUTRIN SR) 150 MG 12 hr tablet Take 1 tablet (150 mg) by mouth 2 times daily. 60 tablet 11    CALCIUM PO Take 300 mg by mouth daily      chlorpheniramine-pseudoePHEDrine (PSEUDO-GEST PLUS) 4-60 MG TABS Take 1 tablet by mouth every 6 hours as needed for allergies      Glucosamine-Chondroitin (GLUCOSAMINE CHONDR COMPLEX PO) Take 2,000 mg by mouth daily      lisinopril (ZESTRIL) 20 MG tablet Take 1 tablet (20 mg) by mouth daily. 90 tablet 3    [START ON 8/12/2025] nicotine (NICODERM CQ) 14 MG/24HR 24 hr patch Place 1 patch over 24 hours onto the skin every 24 hours for 14 days. 14 patch 0    nicotine (NICODERM CQ) 21 MG/24HR 24 hr patch Place 1 patch over 24 hours onto the skin every 24 hours. (Patient not taking: Reported on 7/14/2025) 42 patch 0    [START ON 8/27/2025] nicotine (NICODERM CQ) 7 MG/24HR 24 hr patch Place 1 patch over 24 hours onto the skin every 24 hours for 14 days. (Patient not taking: Reported on 7/14/2025) 14 patch 0    nicotine (NICODERM CQ) 7 MG/24HR 24 hr patch Place 1 patch over 24 hours onto the skin every 24 hours. (Patient not taking: Reported on 7/14/2025) 14 patch 0    nicotine (NICORETTE) 2 MG gum How to take it: When the urge to smoke occurs, chew gum until you feel a tingle, then \"park\" the gum in your cheek until the tingle is gone. Re-chew every few minutes and \"park\" again, chewing one piece for 30 minutes. Do not eat or drink while chewing.  Follow this schedule: Weeks 1 to 6: One piece of gum every 1 to 2 hours. Use at least 9 pieces a day, but no more than 24. Weeks 7 to 9: One piece of gum every 2 to 4 hours. Weeks 10 to 12: One piece of gum every 4 to 8 hours. (Patient not taking: Reported on 7/14/2025) 100 each 5    " omeprazole (PRILOSEC) 20 MG DR capsule Take 1 capsule (20 mg) by mouth every morning 90 capsule 3    pseudoePHEDrine (SUDAFED) 30 MG tablet Take by mouth every 4 hours as needed for congestion.      spacer (OPTICHAMBER MARIA TERESA) holding chamber Use with albuterol inhaler 1 each 0    tirzepatide (MOUNJARO) 2.5 MG/0.5ML SOAJ auto-injector pen Inject 0.5 mLs (2.5 mg) subcutaneously once a week. 6 mL 3     No current facility-administered medications for this visit.      PE:    Vitals: /66   Pulse 82   Temp 98.5  F (36.9  C) (Oral)   Resp 18   Wt 67.7 kg (149 lb 3.2 oz)   LMP  (LMP Unknown)   SpO2 93%   BMI 29.49 kg/m    BMI= Body mass index is 29.49 kg/m .     A/P:    1) Extensive SCLC:  I discussed that the patient now obviously has extensive disease.  We will start with carboplatinum, etoposide, and Tecentriq.  We will obtain a CT CAP after two cycles of therapy, and then a PET after 4 cycles of therapy.  We will plan on maintenance Tecentriq.  She could participate in the [employer] study but we did not have time to discuss this today.  This would begin after a good response to 4 cycles of therapy.    The patient was thoroughly informed about the treatment plan, including the specific chemotherapy agents being used, their expected outcomes, and the goals of treatment. The potential benefits of chemotherapy and/or immunotherapy were discussed, including the possibility of tumor shrinkage, disease control, and improvement in symptoms.     The risks and potential side effects of chemotherapy and/or, including but not limited to nausea, vomiting, hair loss, fatigue, immunosuppression, infection, and organ toxicity, were explained. We also discussed the possibility of long-term or delayed side effects, such as secondary cancers or infertility, depending on the regimen.     The patient was given the opportunity to ask questions and has expressed understanding of the information provided. All questions were  answered to the patient's satisfaction. The patient has consented to proceed with the treatment plan, understanding both the risks and benefits     I spent over 40 minutes coordinating care and discussing this therapy with the patient and her family.  The longitudinal plan of care for the diagnosis(es)/condition(s) as documented were addressed during this visit. Due to the added complexity in care, I will continue to support Herminia in the subsequent management and with ongoing continuity of care.

## 2025-07-21 ENCOUNTER — ONCOLOGY VISIT (OUTPATIENT)
Dept: ONCOLOGY | Facility: CLINIC | Age: 70
End: 2025-07-21
Attending: INTERNAL MEDICINE
Payer: COMMERCIAL

## 2025-07-21 VITALS
WEIGHT: 149.2 LBS | TEMPERATURE: 98.5 F | BODY MASS INDEX: 29.49 KG/M2 | DIASTOLIC BLOOD PRESSURE: 66 MMHG | RESPIRATION RATE: 18 BRPM | SYSTOLIC BLOOD PRESSURE: 112 MMHG | HEART RATE: 82 BPM | OXYGEN SATURATION: 93 %

## 2025-07-21 DIAGNOSIS — C77.8 SMALL CELL CARCINOMA OF LUNG METASTATIC TO LYMPH NODES OF MULTIPLE SITES (H): ICD-10-CM

## 2025-07-21 DIAGNOSIS — Z51.11 ENCOUNTER FOR ANTINEOPLASTIC CHEMOTHERAPY: Primary | ICD-10-CM

## 2025-07-21 DIAGNOSIS — C34.90 SMALL CELL CARCINOMA OF LUNG METASTATIC TO LYMPH NODES OF MULTIPLE SITES (H): ICD-10-CM

## 2025-07-21 LAB
ALBUMIN SERPL BCG-MCNC: 4 G/DL (ref 3.5–5.2)
ALP SERPL-CCNC: 110 U/L (ref 40–150)
ALT SERPL W P-5'-P-CCNC: 15 U/L (ref 0–50)
ANION GAP SERPL CALCULATED.3IONS-SCNC: 12 MMOL/L (ref 7–15)
AST SERPL W P-5'-P-CCNC: 24 U/L (ref 0–45)
BASOPHILS # BLD AUTO: 0 10E3/UL (ref 0–0.2)
BASOPHILS NFR BLD AUTO: 0 %
BILIRUB SERPL-MCNC: 0.2 MG/DL
BUN SERPL-MCNC: 10.2 MG/DL (ref 8–23)
CALCIUM SERPL-MCNC: 9.3 MG/DL (ref 8.8–10.4)
CHLORIDE SERPL-SCNC: 104 MMOL/L (ref 98–107)
CREAT SERPL-MCNC: 0.72 MG/DL (ref 0.51–0.95)
EGFRCR SERPLBLD CKD-EPI 2021: 90 ML/MIN/1.73M2
EOSINOPHIL # BLD AUTO: 0.1 10E3/UL (ref 0–0.7)
EOSINOPHIL NFR BLD AUTO: 2 %
ERYTHROCYTE [DISTWIDTH] IN BLOOD BY AUTOMATED COUNT: 14.7 % (ref 10–15)
GLUCOSE SERPL-MCNC: 95 MG/DL (ref 70–99)
HCO3 SERPL-SCNC: 25 MMOL/L (ref 22–29)
HCT VFR BLD AUTO: 46 % (ref 35–47)
HGB BLD-MCNC: 14.4 G/DL (ref 11.7–15.7)
IMM GRANULOCYTES # BLD: 0 10E3/UL
IMM GRANULOCYTES NFR BLD: 0 %
LYMPHOCYTES # BLD AUTO: 1.5 10E3/UL (ref 0.8–5.3)
LYMPHOCYTES NFR BLD AUTO: 26 %
MCH RBC QN AUTO: 27.4 PG (ref 26.5–33)
MCHC RBC AUTO-ENTMCNC: 31.3 G/DL (ref 31.5–36.5)
MCV RBC AUTO: 88 FL (ref 78–100)
MONOCYTES # BLD AUTO: 0.4 10E3/UL (ref 0–1.3)
MONOCYTES NFR BLD AUTO: 6 %
NEUTROPHILS # BLD AUTO: 3.8 10E3/UL (ref 1.6–8.3)
NEUTROPHILS NFR BLD AUTO: 65 %
NRBC # BLD AUTO: 0 10E3/UL
NRBC BLD AUTO-RTO: 0 /100
PLATELET # BLD AUTO: 182 10E3/UL (ref 150–450)
POTASSIUM SERPL-SCNC: 4 MMOL/L (ref 3.4–5.3)
PROT SERPL-MCNC: 7 G/DL (ref 6.4–8.3)
RBC # BLD AUTO: 5.25 10E6/UL (ref 3.8–5.2)
SODIUM SERPL-SCNC: 141 MMOL/L (ref 135–145)
WBC # BLD AUTO: 5.8 10E3/UL (ref 4–11)

## 2025-07-21 PROCEDURE — 80053 COMPREHEN METABOLIC PANEL: CPT

## 2025-07-21 PROCEDURE — G0463 HOSPITAL OUTPT CLINIC VISIT: HCPCS | Performed by: INTERNAL MEDICINE

## 2025-07-21 PROCEDURE — 85025 COMPLETE CBC W/AUTO DIFF WBC: CPT

## 2025-07-21 PROCEDURE — 36415 COLL VENOUS BLD VENIPUNCTURE: CPT

## 2025-07-21 RX ORDER — MEPERIDINE HYDROCHLORIDE 25 MG/ML
25 INJECTION INTRAMUSCULAR; INTRAVENOUS; SUBCUTANEOUS
OUTPATIENT
Start: 2025-08-15

## 2025-07-21 RX ORDER — HEPARIN SODIUM,PORCINE 10 UNIT/ML
5-20 VIAL (ML) INTRAVENOUS DAILY PRN
OUTPATIENT
Start: 2025-09-25

## 2025-07-21 RX ORDER — DIPHENHYDRAMINE HYDROCHLORIDE 50 MG/ML
50 INJECTION, SOLUTION INTRAMUSCULAR; INTRAVENOUS
Start: 2025-09-06

## 2025-07-21 RX ORDER — LORAZEPAM 2 MG/ML
0.5 INJECTION INTRAMUSCULAR EVERY 4 HOURS PRN
OUTPATIENT
Start: 2025-09-25

## 2025-07-21 RX ORDER — HEPARIN SODIUM,PORCINE 10 UNIT/ML
5-20 VIAL (ML) INTRAVENOUS DAILY PRN
OUTPATIENT
Start: 2025-09-04

## 2025-07-21 RX ORDER — METHYLPREDNISOLONE SODIUM SUCCINATE 40 MG/ML
40 INJECTION INTRAMUSCULAR; INTRAVENOUS
Start: 2025-08-16

## 2025-07-21 RX ORDER — ALBUTEROL SULFATE 0.83 MG/ML
2.5 SOLUTION RESPIRATORY (INHALATION)
OUTPATIENT
Start: 2025-09-06

## 2025-07-21 RX ORDER — MEPERIDINE HYDROCHLORIDE 25 MG/ML
25 INJECTION INTRAMUSCULAR; INTRAVENOUS; SUBCUTANEOUS
OUTPATIENT
Start: 2025-07-25

## 2025-07-21 RX ORDER — LORAZEPAM 2 MG/ML
0.5 INJECTION INTRAMUSCULAR EVERY 4 HOURS PRN
OUTPATIENT
Start: 2025-07-26

## 2025-07-21 RX ORDER — HEPARIN SODIUM (PORCINE) LOCK FLUSH IV SOLN 100 UNIT/ML 100 UNIT/ML
5 SOLUTION INTRAVENOUS
OUTPATIENT
Start: 2025-09-26

## 2025-07-21 RX ORDER — HEPARIN SODIUM,PORCINE 10 UNIT/ML
5-20 VIAL (ML) INTRAVENOUS DAILY PRN
OUTPATIENT
Start: 2025-08-16

## 2025-07-21 RX ORDER — EPINEPHRINE 1 MG/ML
0.3 INJECTION, SOLUTION INTRAMUSCULAR; SUBCUTANEOUS EVERY 5 MIN PRN
OUTPATIENT
Start: 2025-08-16

## 2025-07-21 RX ORDER — EPINEPHRINE 1 MG/ML
0.3 INJECTION, SOLUTION INTRAMUSCULAR; SUBCUTANEOUS EVERY 5 MIN PRN
OUTPATIENT
Start: 2025-09-26

## 2025-07-21 RX ORDER — HEPARIN SODIUM,PORCINE 10 UNIT/ML
5-20 VIAL (ML) INTRAVENOUS DAILY PRN
OUTPATIENT
Start: 2025-09-26

## 2025-07-21 RX ORDER — LORAZEPAM 2 MG/ML
0.5 INJECTION INTRAMUSCULAR EVERY 4 HOURS PRN
OUTPATIENT
Start: 2025-09-04

## 2025-07-21 RX ORDER — HEPARIN SODIUM (PORCINE) LOCK FLUSH IV SOLN 100 UNIT/ML 100 UNIT/ML
5 SOLUTION INTRAVENOUS
Status: CANCELLED | OUTPATIENT
Start: 2025-07-24

## 2025-07-21 RX ORDER — LORAZEPAM 2 MG/ML
0.5 INJECTION INTRAMUSCULAR EVERY 4 HOURS PRN
OUTPATIENT
Start: 2025-07-25

## 2025-07-21 RX ORDER — HEPARIN SODIUM (PORCINE) LOCK FLUSH IV SOLN 100 UNIT/ML 100 UNIT/ML
5 SOLUTION INTRAVENOUS
OUTPATIENT
Start: 2025-09-05

## 2025-07-21 RX ORDER — ALBUTEROL SULFATE 90 UG/1
1-2 INHALANT RESPIRATORY (INHALATION)
Start: 2025-09-04

## 2025-07-21 RX ORDER — METHYLPREDNISOLONE SODIUM SUCCINATE 40 MG/ML
40 INJECTION INTRAMUSCULAR; INTRAVENOUS
Status: CANCELLED
Start: 2025-07-24

## 2025-07-21 RX ORDER — LORAZEPAM 2 MG/ML
0.5 INJECTION INTRAMUSCULAR EVERY 4 HOURS PRN
OUTPATIENT
Start: 2025-09-05

## 2025-07-21 RX ORDER — ALBUTEROL SULFATE 90 UG/1
1-2 INHALANT RESPIRATORY (INHALATION)
Start: 2025-09-06

## 2025-07-21 RX ORDER — ALBUTEROL SULFATE 0.83 MG/ML
2.5 SOLUTION RESPIRATORY (INHALATION)
Status: CANCELLED | OUTPATIENT
Start: 2025-07-24

## 2025-07-21 RX ORDER — EPINEPHRINE 1 MG/ML
0.3 INJECTION, SOLUTION INTRAMUSCULAR; SUBCUTANEOUS EVERY 5 MIN PRN
OUTPATIENT
Start: 2025-07-26

## 2025-07-21 RX ORDER — EPINEPHRINE 1 MG/ML
0.3 INJECTION, SOLUTION INTRAMUSCULAR; SUBCUTANEOUS EVERY 5 MIN PRN
OUTPATIENT
Start: 2025-09-05

## 2025-07-21 RX ORDER — HEPARIN SODIUM (PORCINE) LOCK FLUSH IV SOLN 100 UNIT/ML 100 UNIT/ML
5 SOLUTION INTRAVENOUS
OUTPATIENT
Start: 2025-07-25

## 2025-07-21 RX ORDER — ALBUTEROL SULFATE 90 UG/1
1-2 INHALANT RESPIRATORY (INHALATION)
Start: 2025-08-14

## 2025-07-21 RX ORDER — ALBUTEROL SULFATE 0.83 MG/ML
2.5 SOLUTION RESPIRATORY (INHALATION)
OUTPATIENT
Start: 2025-08-14

## 2025-07-21 RX ORDER — ALBUTEROL SULFATE 0.83 MG/ML
2.5 SOLUTION RESPIRATORY (INHALATION)
OUTPATIENT
Start: 2025-09-26

## 2025-07-21 RX ORDER — EPINEPHRINE 1 MG/ML
0.3 INJECTION, SOLUTION INTRAMUSCULAR; SUBCUTANEOUS EVERY 5 MIN PRN
OUTPATIENT
Start: 2025-08-14

## 2025-07-21 RX ORDER — ALBUTEROL SULFATE 90 UG/1
1-2 INHALANT RESPIRATORY (INHALATION)
Start: 2025-09-26

## 2025-07-21 RX ORDER — DIPHENHYDRAMINE HYDROCHLORIDE 50 MG/ML
25 INJECTION, SOLUTION INTRAMUSCULAR; INTRAVENOUS
Start: 2025-08-16

## 2025-07-21 RX ORDER — HEPARIN SODIUM,PORCINE 10 UNIT/ML
5-20 VIAL (ML) INTRAVENOUS DAILY PRN
OUTPATIENT
Start: 2025-09-05

## 2025-07-21 RX ORDER — HEPARIN SODIUM,PORCINE 10 UNIT/ML
5-20 VIAL (ML) INTRAVENOUS DAILY PRN
OUTPATIENT
Start: 2025-07-25

## 2025-07-21 RX ORDER — DIPHENHYDRAMINE HYDROCHLORIDE 50 MG/ML
50 INJECTION, SOLUTION INTRAMUSCULAR; INTRAVENOUS
Start: 2025-09-25

## 2025-07-21 RX ORDER — EPINEPHRINE 1 MG/ML
0.3 INJECTION, SOLUTION INTRAMUSCULAR; SUBCUTANEOUS EVERY 5 MIN PRN
OUTPATIENT
Start: 2025-09-25

## 2025-07-21 RX ORDER — HEPARIN SODIUM (PORCINE) LOCK FLUSH IV SOLN 100 UNIT/ML 100 UNIT/ML
5 SOLUTION INTRAVENOUS
OUTPATIENT
Start: 2025-09-04

## 2025-07-21 RX ORDER — DIPHENHYDRAMINE HYDROCHLORIDE 50 MG/ML
50 INJECTION, SOLUTION INTRAMUSCULAR; INTRAVENOUS
Status: CANCELLED
Start: 2025-07-24

## 2025-07-21 RX ORDER — EPINEPHRINE 1 MG/ML
0.3 INJECTION, SOLUTION INTRAMUSCULAR; SUBCUTANEOUS EVERY 5 MIN PRN
OUTPATIENT
Start: 2025-08-15

## 2025-07-21 RX ORDER — DIPHENHYDRAMINE HYDROCHLORIDE 50 MG/ML
25 INJECTION, SOLUTION INTRAMUSCULAR; INTRAVENOUS
Start: 2025-07-26

## 2025-07-21 RX ORDER — DIPHENHYDRAMINE HYDROCHLORIDE 50 MG/ML
50 INJECTION, SOLUTION INTRAMUSCULAR; INTRAVENOUS
Start: 2025-07-26

## 2025-07-21 RX ORDER — ALBUTEROL SULFATE 0.83 MG/ML
2.5 SOLUTION RESPIRATORY (INHALATION)
OUTPATIENT
Start: 2025-09-05

## 2025-07-21 RX ORDER — ALBUTEROL SULFATE 90 UG/1
1-2 INHALANT RESPIRATORY (INHALATION)
Start: 2025-08-15

## 2025-07-21 RX ORDER — DIPHENHYDRAMINE HYDROCHLORIDE 50 MG/ML
25 INJECTION, SOLUTION INTRAMUSCULAR; INTRAVENOUS
Start: 2025-07-25

## 2025-07-21 RX ORDER — HEPARIN SODIUM (PORCINE) LOCK FLUSH IV SOLN 100 UNIT/ML 100 UNIT/ML
5 SOLUTION INTRAVENOUS
OUTPATIENT
Start: 2025-09-27

## 2025-07-21 RX ORDER — DIPHENHYDRAMINE HYDROCHLORIDE 50 MG/ML
50 INJECTION, SOLUTION INTRAMUSCULAR; INTRAVENOUS
Start: 2025-08-14

## 2025-07-21 RX ORDER — DIPHENHYDRAMINE HYDROCHLORIDE 50 MG/ML
25 INJECTION, SOLUTION INTRAMUSCULAR; INTRAVENOUS
Status: CANCELLED
Start: 2025-07-24

## 2025-07-21 RX ORDER — DIPHENHYDRAMINE HYDROCHLORIDE 50 MG/ML
50 INJECTION, SOLUTION INTRAMUSCULAR; INTRAVENOUS
Start: 2025-08-16

## 2025-07-21 RX ORDER — DIPHENHYDRAMINE HYDROCHLORIDE 50 MG/ML
25 INJECTION, SOLUTION INTRAMUSCULAR; INTRAVENOUS
Start: 2025-09-27

## 2025-07-21 RX ORDER — LORAZEPAM 2 MG/ML
0.5 INJECTION INTRAMUSCULAR EVERY 4 HOURS PRN
OUTPATIENT
Start: 2025-09-26

## 2025-07-21 RX ORDER — HEPARIN SODIUM (PORCINE) LOCK FLUSH IV SOLN 100 UNIT/ML 100 UNIT/ML
5 SOLUTION INTRAVENOUS
OUTPATIENT
Start: 2025-08-16

## 2025-07-21 RX ORDER — ALBUTEROL SULFATE 0.83 MG/ML
2.5 SOLUTION RESPIRATORY (INHALATION)
OUTPATIENT
Start: 2025-08-16

## 2025-07-21 RX ORDER — METHYLPREDNISOLONE SODIUM SUCCINATE 40 MG/ML
40 INJECTION INTRAMUSCULAR; INTRAVENOUS
Start: 2025-09-25

## 2025-07-21 RX ORDER — MEPERIDINE HYDROCHLORIDE 25 MG/ML
25 INJECTION INTRAMUSCULAR; INTRAVENOUS; SUBCUTANEOUS
OUTPATIENT
Start: 2025-09-06

## 2025-07-21 RX ORDER — DIPHENHYDRAMINE HYDROCHLORIDE 50 MG/ML
25 INJECTION, SOLUTION INTRAMUSCULAR; INTRAVENOUS
Start: 2025-09-06

## 2025-07-21 RX ORDER — MEPERIDINE HYDROCHLORIDE 25 MG/ML
25 INJECTION INTRAMUSCULAR; INTRAVENOUS; SUBCUTANEOUS
OUTPATIENT
Start: 2025-09-25

## 2025-07-21 RX ORDER — DIPHENHYDRAMINE HYDROCHLORIDE 50 MG/ML
50 INJECTION, SOLUTION INTRAMUSCULAR; INTRAVENOUS
Start: 2025-09-27

## 2025-07-21 RX ORDER — MEPERIDINE HYDROCHLORIDE 25 MG/ML
25 INJECTION INTRAMUSCULAR; INTRAVENOUS; SUBCUTANEOUS
OUTPATIENT
Start: 2025-09-04

## 2025-07-21 RX ORDER — HEPARIN SODIUM,PORCINE 10 UNIT/ML
5-20 VIAL (ML) INTRAVENOUS DAILY PRN
OUTPATIENT
Start: 2025-09-06

## 2025-07-21 RX ORDER — DIPHENHYDRAMINE HYDROCHLORIDE 50 MG/ML
50 INJECTION, SOLUTION INTRAMUSCULAR; INTRAVENOUS
Start: 2025-08-15

## 2025-07-21 RX ORDER — METHYLPREDNISOLONE SODIUM SUCCINATE 40 MG/ML
40 INJECTION INTRAMUSCULAR; INTRAVENOUS
Start: 2025-07-25

## 2025-07-21 RX ORDER — MEPERIDINE HYDROCHLORIDE 25 MG/ML
25 INJECTION INTRAMUSCULAR; INTRAVENOUS; SUBCUTANEOUS
OUTPATIENT
Start: 2025-09-27

## 2025-07-21 RX ORDER — EPINEPHRINE 1 MG/ML
0.3 INJECTION, SOLUTION INTRAMUSCULAR; SUBCUTANEOUS EVERY 5 MIN PRN
OUTPATIENT
Start: 2025-07-25

## 2025-07-21 RX ORDER — HEPARIN SODIUM,PORCINE 10 UNIT/ML
5-20 VIAL (ML) INTRAVENOUS DAILY PRN
OUTPATIENT
Start: 2025-08-14

## 2025-07-21 RX ORDER — HEPARIN SODIUM,PORCINE 10 UNIT/ML
5-20 VIAL (ML) INTRAVENOUS DAILY PRN
Status: CANCELLED | OUTPATIENT
Start: 2025-07-24

## 2025-07-21 RX ORDER — METHYLPREDNISOLONE SODIUM SUCCINATE 40 MG/ML
40 INJECTION INTRAMUSCULAR; INTRAVENOUS
Start: 2025-08-14

## 2025-07-21 RX ORDER — HEPARIN SODIUM,PORCINE 10 UNIT/ML
5-20 VIAL (ML) INTRAVENOUS DAILY PRN
OUTPATIENT
Start: 2025-08-15

## 2025-07-21 RX ORDER — LORAZEPAM 2 MG/ML
0.5 INJECTION INTRAMUSCULAR EVERY 4 HOURS PRN
OUTPATIENT
Start: 2025-09-27

## 2025-07-21 RX ORDER — PALONOSETRON 0.05 MG/ML
0.25 INJECTION, SOLUTION INTRAVENOUS ONCE
Status: CANCELLED | OUTPATIENT
Start: 2025-07-24

## 2025-07-21 RX ORDER — HEPARIN SODIUM (PORCINE) LOCK FLUSH IV SOLN 100 UNIT/ML 100 UNIT/ML
5 SOLUTION INTRAVENOUS
OUTPATIENT
Start: 2025-08-15

## 2025-07-21 RX ORDER — LORAZEPAM 2 MG/ML
0.5 INJECTION INTRAMUSCULAR EVERY 4 HOURS PRN
OUTPATIENT
Start: 2025-08-16

## 2025-07-21 RX ORDER — DIPHENHYDRAMINE HYDROCHLORIDE 50 MG/ML
50 INJECTION, SOLUTION INTRAMUSCULAR; INTRAVENOUS
Start: 2025-09-04

## 2025-07-21 RX ORDER — ALBUTEROL SULFATE 0.83 MG/ML
2.5 SOLUTION RESPIRATORY (INHALATION)
OUTPATIENT
Start: 2025-07-25

## 2025-07-21 RX ORDER — ALBUTEROL SULFATE 0.83 MG/ML
2.5 SOLUTION RESPIRATORY (INHALATION)
OUTPATIENT
Start: 2025-07-26

## 2025-07-21 RX ORDER — HEPARIN SODIUM,PORCINE 10 UNIT/ML
5-20 VIAL (ML) INTRAVENOUS DAILY PRN
OUTPATIENT
Start: 2025-09-27

## 2025-07-21 RX ORDER — METHYLPREDNISOLONE SODIUM SUCCINATE 40 MG/ML
40 INJECTION INTRAMUSCULAR; INTRAVENOUS
Start: 2025-09-04

## 2025-07-21 RX ORDER — HEPARIN SODIUM (PORCINE) LOCK FLUSH IV SOLN 100 UNIT/ML 100 UNIT/ML
5 SOLUTION INTRAVENOUS
OUTPATIENT
Start: 2025-09-06

## 2025-07-21 RX ORDER — ALBUTEROL SULFATE 90 UG/1
1-2 INHALANT RESPIRATORY (INHALATION)
Status: CANCELLED
Start: 2025-07-24

## 2025-07-21 RX ORDER — METHYLPREDNISOLONE SODIUM SUCCINATE 40 MG/ML
40 INJECTION INTRAMUSCULAR; INTRAVENOUS
Start: 2025-08-15

## 2025-07-21 RX ORDER — HEPARIN SODIUM (PORCINE) LOCK FLUSH IV SOLN 100 UNIT/ML 100 UNIT/ML
5 SOLUTION INTRAVENOUS
OUTPATIENT
Start: 2025-07-26

## 2025-07-21 RX ORDER — METHYLPREDNISOLONE SODIUM SUCCINATE 40 MG/ML
40 INJECTION INTRAMUSCULAR; INTRAVENOUS
Start: 2025-09-27

## 2025-07-21 RX ORDER — MEPERIDINE HYDROCHLORIDE 25 MG/ML
25 INJECTION INTRAMUSCULAR; INTRAVENOUS; SUBCUTANEOUS
OUTPATIENT
Start: 2025-08-16

## 2025-07-21 RX ORDER — EPINEPHRINE 1 MG/ML
0.3 INJECTION, SOLUTION INTRAMUSCULAR; SUBCUTANEOUS EVERY 5 MIN PRN
OUTPATIENT
Start: 2025-09-04

## 2025-07-21 RX ORDER — ALBUTEROL SULFATE 0.83 MG/ML
2.5 SOLUTION RESPIRATORY (INHALATION)
OUTPATIENT
Start: 2025-08-15

## 2025-07-21 RX ORDER — MEPERIDINE HYDROCHLORIDE 25 MG/ML
25 INJECTION INTRAMUSCULAR; INTRAVENOUS; SUBCUTANEOUS
OUTPATIENT
Start: 2025-08-14

## 2025-07-21 RX ORDER — LORAZEPAM 2 MG/ML
0.5 INJECTION INTRAMUSCULAR EVERY 4 HOURS PRN
Status: CANCELLED | OUTPATIENT
Start: 2025-07-24

## 2025-07-21 RX ORDER — MEPERIDINE HYDROCHLORIDE 25 MG/ML
25 INJECTION INTRAMUSCULAR; INTRAVENOUS; SUBCUTANEOUS
Status: CANCELLED | OUTPATIENT
Start: 2025-07-24

## 2025-07-21 RX ORDER — DIPHENHYDRAMINE HYDROCHLORIDE 50 MG/ML
50 INJECTION, SOLUTION INTRAMUSCULAR; INTRAVENOUS
Start: 2025-07-25

## 2025-07-21 RX ORDER — MEPERIDINE HYDROCHLORIDE 25 MG/ML
25 INJECTION INTRAMUSCULAR; INTRAVENOUS; SUBCUTANEOUS
OUTPATIENT
Start: 2025-09-05

## 2025-07-21 RX ORDER — MEPERIDINE HYDROCHLORIDE 25 MG/ML
25 INJECTION INTRAMUSCULAR; INTRAVENOUS; SUBCUTANEOUS
OUTPATIENT
Start: 2025-09-26

## 2025-07-21 RX ORDER — METHYLPREDNISOLONE SODIUM SUCCINATE 40 MG/ML
40 INJECTION INTRAMUSCULAR; INTRAVENOUS
Start: 2025-09-26

## 2025-07-21 RX ORDER — PALONOSETRON 0.05 MG/ML
0.25 INJECTION, SOLUTION INTRAVENOUS ONCE
OUTPATIENT
Start: 2025-09-04

## 2025-07-21 RX ORDER — MEPERIDINE HYDROCHLORIDE 25 MG/ML
25 INJECTION INTRAMUSCULAR; INTRAVENOUS; SUBCUTANEOUS
OUTPATIENT
Start: 2025-07-26

## 2025-07-21 RX ORDER — EPINEPHRINE 1 MG/ML
0.3 INJECTION, SOLUTION INTRAMUSCULAR; SUBCUTANEOUS EVERY 5 MIN PRN
Status: CANCELLED | OUTPATIENT
Start: 2025-07-24

## 2025-07-21 RX ORDER — ALBUTEROL SULFATE 0.83 MG/ML
2.5 SOLUTION RESPIRATORY (INHALATION)
OUTPATIENT
Start: 2025-09-27

## 2025-07-21 RX ORDER — LORAZEPAM 2 MG/ML
0.5 INJECTION INTRAMUSCULAR EVERY 4 HOURS PRN
OUTPATIENT
Start: 2025-08-15

## 2025-07-21 RX ORDER — METHYLPREDNISOLONE SODIUM SUCCINATE 40 MG/ML
40 INJECTION INTRAMUSCULAR; INTRAVENOUS
Start: 2025-07-26

## 2025-07-21 RX ORDER — ALBUTEROL SULFATE 90 UG/1
1-2 INHALANT RESPIRATORY (INHALATION)
Start: 2025-09-05

## 2025-07-21 RX ORDER — EPINEPHRINE 1 MG/ML
0.3 INJECTION, SOLUTION INTRAMUSCULAR; SUBCUTANEOUS EVERY 5 MIN PRN
OUTPATIENT
Start: 2025-09-06

## 2025-07-21 RX ORDER — DIPHENHYDRAMINE HYDROCHLORIDE 50 MG/ML
25 INJECTION, SOLUTION INTRAMUSCULAR; INTRAVENOUS
Start: 2025-09-26

## 2025-07-21 RX ORDER — DIPHENHYDRAMINE HYDROCHLORIDE 50 MG/ML
25 INJECTION, SOLUTION INTRAMUSCULAR; INTRAVENOUS
Start: 2025-08-15

## 2025-07-21 RX ORDER — HEPARIN SODIUM,PORCINE 10 UNIT/ML
5-20 VIAL (ML) INTRAVENOUS DAILY PRN
OUTPATIENT
Start: 2025-07-26

## 2025-07-21 RX ORDER — EPINEPHRINE 1 MG/ML
0.3 INJECTION, SOLUTION INTRAMUSCULAR; SUBCUTANEOUS EVERY 5 MIN PRN
OUTPATIENT
Start: 2025-09-27

## 2025-07-21 RX ORDER — ALBUTEROL SULFATE 90 UG/1
1-2 INHALANT RESPIRATORY (INHALATION)
Start: 2025-07-25

## 2025-07-21 RX ORDER — METHYLPREDNISOLONE SODIUM SUCCINATE 40 MG/ML
40 INJECTION INTRAMUSCULAR; INTRAVENOUS
Start: 2025-09-06

## 2025-07-21 RX ORDER — DIPHENHYDRAMINE HYDROCHLORIDE 50 MG/ML
25 INJECTION, SOLUTION INTRAMUSCULAR; INTRAVENOUS
Start: 2025-09-04

## 2025-07-21 RX ORDER — LORAZEPAM 2 MG/ML
0.5 INJECTION INTRAMUSCULAR EVERY 4 HOURS PRN
OUTPATIENT
Start: 2025-08-14

## 2025-07-21 RX ORDER — HEPARIN SODIUM (PORCINE) LOCK FLUSH IV SOLN 100 UNIT/ML 100 UNIT/ML
5 SOLUTION INTRAVENOUS
OUTPATIENT
Start: 2025-08-14

## 2025-07-21 RX ORDER — LORAZEPAM 2 MG/ML
0.5 INJECTION INTRAMUSCULAR EVERY 4 HOURS PRN
OUTPATIENT
Start: 2025-09-06

## 2025-07-21 RX ORDER — ALBUTEROL SULFATE 0.83 MG/ML
2.5 SOLUTION RESPIRATORY (INHALATION)
OUTPATIENT
Start: 2025-09-25

## 2025-07-21 RX ORDER — DIPHENHYDRAMINE HYDROCHLORIDE 50 MG/ML
50 INJECTION, SOLUTION INTRAMUSCULAR; INTRAVENOUS
Start: 2025-09-26

## 2025-07-21 RX ORDER — PALONOSETRON 0.05 MG/ML
0.25 INJECTION, SOLUTION INTRAVENOUS ONCE
OUTPATIENT
Start: 2025-09-25

## 2025-07-21 RX ORDER — ALBUTEROL SULFATE 90 UG/1
1-2 INHALANT RESPIRATORY (INHALATION)
Start: 2025-09-27

## 2025-07-21 RX ORDER — ALBUTEROL SULFATE 90 UG/1
1-2 INHALANT RESPIRATORY (INHALATION)
Start: 2025-08-16

## 2025-07-21 RX ORDER — HEPARIN SODIUM (PORCINE) LOCK FLUSH IV SOLN 100 UNIT/ML 100 UNIT/ML
5 SOLUTION INTRAVENOUS
OUTPATIENT
Start: 2025-09-25

## 2025-07-21 RX ORDER — ALBUTEROL SULFATE 90 UG/1
1-2 INHALANT RESPIRATORY (INHALATION)
Start: 2025-07-26

## 2025-07-21 RX ORDER — DIPHENHYDRAMINE HYDROCHLORIDE 50 MG/ML
25 INJECTION, SOLUTION INTRAMUSCULAR; INTRAVENOUS
Start: 2025-09-05

## 2025-07-21 RX ORDER — DIPHENHYDRAMINE HYDROCHLORIDE 50 MG/ML
25 INJECTION, SOLUTION INTRAMUSCULAR; INTRAVENOUS
Start: 2025-08-14

## 2025-07-21 RX ORDER — DIPHENHYDRAMINE HYDROCHLORIDE 50 MG/ML
25 INJECTION, SOLUTION INTRAMUSCULAR; INTRAVENOUS
Start: 2025-09-25

## 2025-07-21 RX ORDER — METHYLPREDNISOLONE SODIUM SUCCINATE 40 MG/ML
40 INJECTION INTRAMUSCULAR; INTRAVENOUS
Start: 2025-09-05

## 2025-07-21 RX ORDER — PALONOSETRON 0.05 MG/ML
0.25 INJECTION, SOLUTION INTRAVENOUS ONCE
OUTPATIENT
Start: 2025-08-14

## 2025-07-21 RX ORDER — ALBUTEROL SULFATE 90 UG/1
1-2 INHALANT RESPIRATORY (INHALATION)
Start: 2025-09-25

## 2025-07-21 RX ORDER — ALBUTEROL SULFATE 0.83 MG/ML
2.5 SOLUTION RESPIRATORY (INHALATION)
OUTPATIENT
Start: 2025-09-04

## 2025-07-21 RX ORDER — DIPHENHYDRAMINE HYDROCHLORIDE 50 MG/ML
50 INJECTION, SOLUTION INTRAMUSCULAR; INTRAVENOUS
Start: 2025-09-05

## 2025-07-21 ASSESSMENT — PAIN SCALES - GENERAL: PAINLEVEL_OUTOF10: NO PAIN (0)

## 2025-07-21 NOTE — LETTER
7/21/2025      Herminia Pastor  [address] [facility] [address]  [facility] [address]      Dear Colleague,    Thank you for referring your patient, Herminia Pastor, to the [facility]. Please see a copy of my visit note below.    CC:  Extensive SCLC (Left lung)     HPI: The patient is a 68 yo female with a diagnosis of SCLC. She is an ex-smoker with a 30 pack year hx of smoking.  She had been getting annual lung cancer screening but unfortunately this year she was a few months behind annual schedule.  When she had a CT it was found with suspicious findings left hilum.  She was experiencing wheezing and coughing and evaluated for acute bronchitis around the same time as her annual lung cancer screening. She had been experiencing wheezing and coughing since March, initially thought to be due to viral infection after exposure to a baby. Breathing seemed normal until the week before her EBUS when she experienced exertional dyspnea with mowing the lawn. She was still taking prednisone and doxycycline as it had helped with chest pain.      An EBUS on July 7, 2025 showed SCLC in stations 7, 4R, 4, and the left hilar mass.  Ki-67 was 90%.  The tumor was diffusely positive for diffusely positive for synaptophysin, chromogranin, and INSM1.  When I first met the patient on July 13, 2025, her PET was still pending. Brain MRI showed a 3 mm diffusion restricting, enhancing lesion within the left inferior midbrain and in subependymal white matter of the posterior horn of the left lateral ventricle, concerning for metastasis.     When I saw the patient on 7/14/25 we discussed that the cancer appeared limited although there was a 3 mm lesion in the brain.   The brain lesion was too small to characterize.  The PET wcan was pending.  I told the patient we would see her back in one week and plan on starting carboplatinum and etoposide at that time.  If there was extensive disease we would add  immunotherapy.    PET scan done on 7/17/25 showed FDG multifocal metastases in the liver and bones (left femur and left humerus). There was also a possible cervical node.  I discussed at length that the patient had extensive SCLC and that we would add chemotherapy with Tecentriq and forego concommitant XRT.  I discussed this with Dr. Farley.  We will plan on a restaging PET after 4 cycles of therapy.    ROS:    As above.  The patient occasionally has coughing fits.  I explained that these would likely improve with therapy.    LAB:    Labs are adequate for treatment.    CBC RESULTS:   Recent Labs   Lab Test 07/14/25  1544   WBC 6.3   RBC 5.33*   HGB 14.5   HCT 46.3   MCV 87   MCH 27.2   MCHC 31.3*   RDW 14.8   *      Last Comprehensive Metabolic Panel:  Sodium   Date Value Ref Range Status   07/14/2025 141 135 - 145 mmol/L Final   08/19/2014 139 133 - 144 mmol/L Final     Potassium   Date Value Ref Range Status   07/14/2025 4.2 3.4 - 5.3 mmol/L Final   11/05/2015 3.5 3.4 - 5.3 mmol/L Final     Chloride   Date Value Ref Range Status   07/14/2025 104 98 - 107 mmol/L Final   08/19/2014 109 94 - 109 mmol/L Final     Carbon Dioxide   Date Value Ref Range Status   08/19/2014 28 20 - 32 mmol/L Final     Carbon Dioxide (CO2)   Date Value Ref Range Status   07/14/2025 27 22 - 29 mmol/L Final     Anion Gap   Date Value Ref Range Status   07/14/2025 10 7 - 15 mmol/L Final   08/19/2014 2 (L) 6 - 17 mmol/L Final     Glucose   Date Value Ref Range Status   07/17/2025 120 (A) 70 - 99 mg/dL Final     Urea Nitrogen   Date Value Ref Range Status   07/14/2025 15.6 8.0 - 23.0 mg/dL Final   08/19/2014 10 7 - 30 mg/dL Final     Comment:     Effective 7/30/2014, the reference range for this assay has changed to reflect   new instrumentation/methodology.       Creatinine   Date Value Ref Range Status   07/14/2025 0.81 0.51 - 0.95 mg/dL Final   11/05/2015 0.70 0.52 - 1.04 mg/dL Final     GFR Estimate   Date Value Ref Range Status    2025 78 >60 mL/min/1.73m2 Final     Comment:     eGFR calculated using  CKD-EPI equation.   2015 85 >60 mL/min/1.7m2 Final     Comment:     Non  GFR Calc     Calcium   Date Value Ref Range Status   2025 9.5 8.8 - 10.4 mg/dL Final   2014 8.1 (L) 8.5 - 10.1 mg/dL Final     Comment:     Effective 2014, the reference range for this assay has changed to reflect   new instrumentation/methodology.       Bilirubin Total   Date Value Ref Range Status   2025 0.4 <=1.2 mg/dL Final     Alkaline Phosphatase   Date Value Ref Range Status   2025 100 40 - 150 U/L Final     ALT   Date Value Ref Range Status   2025 21 0 - 50 U/L Final     AST   Date Value Ref Range Status   2025 23 0 - 45 U/L Final     IMAGING:    Recent Results (from the past 744 hours)   CT Chest Lung Cancer Scrn Low Dose wo   Result Value    Radiologist flags      left hilar mass concerning for malignancy until proven    Narrative    CT Low Dose Lung Cancer Screening    History:  Personal history of nicotine dependence Screening for lung  cancer, smoking.    Number of packs-year of smokin  Current or former smoker?: Current  If former, number of years since quit?: n/a    Comparison: 2024    Technique: Helical acquisition low dose CT chest. Images reviewed in  lung, soft tissue and bone windows.  DLP: 18 (mGy*cm)  CTDIvol: 0.49 mGy)    Findings: [All follow up of nodules are based on ACR guidelines for  lung cancer screening and measurements of each nodule size must be the  mean of the longest axial plane measurement by its perpendicular  measured to the nearest decimal and rounded up to the nearest whole  number. ]  .  Nodules: .    Bulky left hilar mass encasing the left lower lobe bronchus with  conglomerate adenopathy in the left hilum also narrowing of the left  upper lobe and lingular bronchus take off. There is postobstructive  atelectasis in the left lower lobe. Superior  "segment of the left lower  lobe bronchus is occluded. There is gas trapping in the left lung.    Emphysema: Trace centrilobular emphysema    Coronary artery calcium: trace    Additional findings: Heart size is normal without pericardial  effusion.  No pleural effusion or pneumothorax. Esophagus is  unremarkable. Borderline adenopathy in the left peritracheal region  and AP window. Bulky left hilar adenopathy.    No acute findings on limited evaluation of the upper abdomen.  Cholecystectomy.   Cyst in the upper pole of the right kidney.    Bones: No bony or soft tissue abnormality.      Impression    Impression:   1. ACR Assessment Category (2022):  Lung-RADS Category 4X. Very  Suspicious.      Recommendation:  Lung-RADS Category 4X. Very Suspicious.  Recommendation:  Chest CT with or without contrast, PET/CT and/or  tissue sampling depending on the *probability of malignancy and  comorbidities. PET/CT may be used when there is a ? 8 mm (? 268.1 mm3)  solid component. For new large nodules that develop on an  annual repeat screening CT, a1 month LDCT may be recommended to  address  potentially infectious or inflammatory conditions     2. Significant Incidental Finding(s):  Category S: No.    3. Any moderate or severe Emphysema or bronchial wall thickening or  mosaic attenuation? No    4. Avoidance of tobacco smoke is strongly advised. Please consider for  smoking cessation to [facility] Medication Therapy Management (MTM) if  clinically appropriate.      [Access Center: left hilar mass concerning for malignancy until proven  otherwise]    This report will be copied to the [facility] to ensure a  provider acknowledges the finding. Access Center is available Monday  through Friday 8am-3:30 pm.     Download the \"LungRADS 2022 Assessment Categories\" table at this site:    [email or web address]    I have " personally reviewed the examination and initial interpretation  and I agree with the findings.    MACRINA OROZCO MD         SYSTEM ID:  [id number]   MR Brain w/o & w Contrast    Narrative    EXAM: MR BRAIN W/O & W CONTRAST  7/10/2025 3:46 PM     HISTORY: new diagnosis of metastatic small cell carcinoma > assessing  for viri mets; Small cell carcinoma of lung metastatic to lymph nodes  of multiple sites (H); Small cell carcinoma of lung metastatic to  lymph nodes of multiple sites (H)       COMPARISON: 7/11/2018    TECHNIQUE: Sagittal and axial T1-weighted, axial diffusion,  multiplanar T2 FLAIR with fat saturation images were obtained without  intravenous contrast. Following intravenous gadolinium-based contrast  administration, axial T2-weighted, axial susceptibility, and  T1-weighted images (in multiple planes) were obtained.    CONTRAST: 7 mL Gadavist.    FINDINGS:  Punctate contrast enhancing diffusion restricting lesion within the  left inferior mid brain seen on series 15 image 57. Additional focus  of enhancement along the lateral subependymal white matter of the  posterior horn of the left lateral ventricle. No mass effect, midline  shift, or intracranial hemorrhage. No abnormal enhancement within  right cerebral pontine angle. Subcortical, periventricular areas of T2  FLAIR white matter hyperintensities, mild to moderate symmetric  generalized parenchymal volume loss and compensatory ventricular  dilatation. No MRI findings of acute hydrocephalus. Preserved major  intracranial vascular flow voids.    Normal skull marrow signal. No substantial paranasal sinus mucosal  disease. Clear mastoid air cells. Nonfocal pituitary gland, sella,  skull base and upper cervical spinal structures. The orbits are  normal.      Impression    IMPRESSION:  1. New compared to prior imaging punctate 3 mm diffusion restricting,  enhancing lesion within the left inferior midbrain and in subependymal  white matter of the posterior  horn of the left lateral ventricle,  concerning for metastasis. Recommend attention on follow-up.  2. Brain atrophy and leukoaraiosis, presumed sequela of chronic  microvascular ischemic disease, as detailed.  3. No abnormal enhancement within the right cerebral pontine angle to  suggest recurrence of schwannoma.    I have personally reviewed the examination and initial interpretation  and I agree with the findings.    ROSETTE NGUYEN MD         SYSTEM ID:  [id number]   PET Oncology Whole Body    Narrative    Combined Report of: PET and CT on 7/17/2025 9:56 AM:     FOLLOW-UP APPOINTMENT: 7/21/2025    1. PET of the neck, chest, abdomen, and pelvis.  2. PET CT Fusion for Attenuation Correction and Anatomical  Localization.  3. Diagnostic CT of the chest, abdomen and pelvis with intravenous  contrast obtained for diagnostic interpretation.  4. 3D MIP and PET-CT fused images were processed on an independent  workstation and archived to PACS and reviewed by a radiologist.    Technique:    1. PET: The patient received 14.8 mCi of F-18-FDG. The serum glucose  was 120 mg/dL prior to administration. Body weight was 67.6 kg. Images  were evaluated in the axial, sagittal, and coronal planes as well as  the rotational whole body MIP. Images were acquired from cranial  vertex to feet.    UPTAKE WAS MEASURED AT 60 MINUTES.     2. CT: Volumetric acquisition for clinical interpretation of the  chest, abdomen, and pelvis acquired at 3 mm sections. The chest,  abdomen, and pelvis were evaluated at 5 mm sections in bone, soft  tissue, and lung windows.    Contrast and Medications:  IV contrast: 89 mL of Isovue 370 intravenously.  PO contrast: None.  Additional Medications: None.    3. 3D MIP and PET-CT fused images were processed on an independent  workstation and archived to PACS and reviewed by a radiologist.    INDICATION: new diagnosis of metastatic small cell carcinoma >  assessing for extrathoracic mets; Small cell carcinoma of  lung  metastatic to lymph nodes of multiple sites (H); Small cell carcinoma  of lung metastatic to lymph nodes of multiple sites (H).    ADDITIONAL INFORMATION OBTAINED FROM EMR: 69-year-old with newly  diagnosed small cell lung carcinoma via endobronchial biopsy, brain  MRI with 3 mm foci concerning for metastases. PET/CT for staging.    COMPARISON: CT chest 6/25/2025.    FINDINGS:     BACKGROUND: Liver SUV max = 3.7, Aorta Blood SUV max = 2.9.     HEAD/NECK:    Lymph nodes: There is a moderately hypermetabolic right level 2A  cervical lymph node that measures 1.3 x 1.0 cm (series 2, image 116).    Symmetric moderate to intensity of the tonsils and of the time.  Moderate symmetric uptake of the vocal folds, likely secondary to  muscle activation. Mild to moderate uptake in the extraocular muscles  bilaterally, likely secondary to muscle activation.     The mucosal and deep spaces of the neck are otherwise unremarkable.  The major salivary glands are unremarkable. The thyroid is  unremarkable. The major vasculature of the neck are patent.    The paranasal sinuses are clear. The mastoid air cells are clear. Mild  mandibular torus.     CHEST:    Lymph nodes: Intensely avid confluent mediastinal lymphadenopathy  encasing the left mainstem bronchus and the right pulmonary artery  with an SUV max of 16.8 There are additional discrete subcarinal and  periaortic hypermetabolic lymph nodes, for example a subcarinal node  with an SUV max of 11.2, measuring up to 1.4 cm (series 2, image 171).    There are multiple borderline enlarged axillary lymph nodes  bilaterally which demonstrate an FDG of avidity lower than the  mediastinal blood pool.    Lungs: There is a large left hilar hypermetabolic mass (SUV max 15.7)  centered in the left lower lobe with extension into the left upper  lobe along the hilum and into the mediastinum. Narrowing of the left  mainstem bronchus with almost complete obstruction of the left lower  lobe  segmental and subsegmental bronchi as well as the left upper lobe  subsegmental bronchus.  Mild atelectasis in the left lower lobe,  improved when compared to prior. Trace centrilobular emphysematous  change.  No pleural effusion or pneumothorax.     Heart and great vessels: Heart size is within normal limits. No  pericardial effusion. The thoracic aorta and main pulmonary artery are  within normal limits. The esophagus is unremarkable.     Breasts: No abnormal uptake in the breasts.    ABDOMEN AND PELVIS:    Liver: Multiple hypoattenuating and hypermetabolic lesions within the  liver in hepatic segments II, Jose, VI, and VII. The most  hypermetabolic lesions include a 1.6 x 1.5 cm lesion in hepatic  segment Jose with an SUV max of 10.9 (series 2, image 228) and 1.7 x  1.3 cm lesion in hepatic segment VII with an SUV max of 7.5) series 2,  image 223). No intrahepatic or extrahepatic biliary ductal dilatation.  Postoperative changes of cholecystectomy.     Pancreas: The pancreas is within normal limits. No pancreatic ductal  dilatation.     Spleen: No FDG avid lesion.    Adrenal glands: Mild uptake in the adrenal glands without focal  nodule.    Kidneys: No FDG avid lesion. The upper pole of the right kidney there  is a 2.1 cm indeterminate hypoattenuating lesion which is not FDG  avid. Multiple additional hypoattenuating benign simple cysts.  Additional subcentimeter hypoattenuating lesions are too small to  accurately characterize. No hydronephrosis. The urinary bladder is  unremarkable.     Calcifications within a likely uterine fibroid.    Gastrointestinal system: Normal caliber of the small and large bowel.     Lymph nodes: No FDG avid or abnormally enlarged lymph nodes.    Vascular structures: Normal caliber of the abdominal aorta. Scattered  atherosclerotic calcification.    No free air, free fluid, or fluid collection. Tiny fat-containing  umbilical hernia.     EXTREMITIES:     No abnormal FDG uptake in the  visualized extremities.    EXTREMITIES, BONES AND SOFT TISSUES:     Area of moderate focal uptake within a sclerotic appearing 1.0 cm  density in the medulla of the left proximal femoral shaft (series 2,  image 421) an SUV max of 6.4. There is an additional mildly  hypermetabolic sclerotic 0.9 cm focus in the medulla of the mid left  humeral shaft (series 2, image 82) with an SUV max of 3.8.     Mild periarticular uptake surrounding the shoulders, hips, and knees  which is favored degenerative.    Focal uptake in the perineum which is favored to represent  contamination.     SPINAL CANAL:     No evident canal compromise. No abnormal FDG avid lesion. Sclerotic  focus in the L5 vertebral body without abnormal FDG uptake.    Context: 69 year old with recently diagnosed small cell carcinoma      Impression    IMPRESSION: Summary: hypermetabolic left lower lobe/hilum small cell  carcinoma with mets to mediastinal nodes, liver, bone.     1. Extensive small cell lung carcinoma with a primary lesion in the  left lower lobe/ hilum with probable lymphangitic carcinomatosis in  the left lobe. The left hilar mass extends into the mediastinum and  encases the left main bronchus and pulmonary artery with near-complete  obstruction of multiple segmental and subsegmental bronchi.    2. Distant mets - multifocal metastases in the liver and bones (left  femur and left humerus).     3. There is an additional moderately hypermetabolic right level 2A  cervical lymph node which is concerning for liliana metastasis.     4. Indeterminate cystic lesion in the upper pole of the right kidney  which is not FDG avid. This may represent a proteinaceous/hemorrhagic  cyst, if further characterization is clinically desired, consider MRI  or multiphase CT.    5. Brain lesion - better demonstrated on MRI.      I have personally reviewed the examination and initial interpretation  and I agree with the findings.    JOHN METZGER MD         SYSTEM  "ID:  [id number]      MED:    Current Outpatient Medications   Medication Sig Dispense Refill     albuterol (PROAIR HFA/PROVENTIL HFA/VENTOLIN HFA) 108 (90 Base) MCG/ACT inhaler Inhale 1-2 puffs into the lungs every 4 hours as needed for shortness of breath, wheezing or cough. 18 g 1     amLODIPine (NORVASC) 10 MG tablet Take 1 tablet (10 mg) by mouth daily. 90 tablet 3     atorvastatin (LIPITOR) 10 MG tablet Take 1 tablet (10 mg) by mouth daily. (Patient not taking: Reported on 7/14/2025) 90 tablet 3     buPROPion (WELLBUTRIN SR) 150 MG 12 hr tablet Take 1 tablet (150 mg) by mouth 2 times daily. 60 tablet 11     CALCIUM PO Take 300 mg by mouth daily       chlorpheniramine-pseudoePHEDrine (PSEUDO-GEST PLUS) 4-60 MG TABS Take 1 tablet by mouth every 6 hours as needed for allergies       Glucosamine-Chondroitin (GLUCOSAMINE CHONDR COMPLEX PO) Take 2,000 mg by mouth daily       lisinopril (ZESTRIL) 20 MG tablet Take 1 tablet (20 mg) by mouth daily. 90 tablet 3     [START ON 8/12/2025] nicotine (NICODERM CQ) 14 MG/24HR 24 hr patch Place 1 patch over 24 hours onto the skin every 24 hours for 14 days. 14 patch 0     nicotine (NICODERM CQ) 21 MG/24HR 24 hr patch Place 1 patch over 24 hours onto the skin every 24 hours. (Patient not taking: Reported on 7/14/2025) 42 patch 0     [START ON 8/27/2025] nicotine (NICODERM CQ) 7 MG/24HR 24 hr patch Place 1 patch over 24 hours onto the skin every 24 hours for 14 days. (Patient not taking: Reported on 7/14/2025) 14 patch 0     nicotine (NICODERM CQ) 7 MG/24HR 24 hr patch Place 1 patch over 24 hours onto the skin every 24 hours. (Patient not taking: Reported on 7/14/2025) 14 patch 0     nicotine (NICORETTE) 2 MG gum How to take it: When the urge to smoke occurs, chew gum until you feel a tingle, then \"park\" the gum in your cheek until the tingle is gone. Re-chew every few minutes and \"park\" again, chewing one piece for 30 minutes. Do not eat or drink while chewing.  Follow this " schedule: Weeks 1 to 6: One piece of gum every 1 to 2 hours. Use at least 9 pieces a day, but no more than 24. Weeks 7 to 9: One piece of gum every 2 to 4 hours. Weeks 10 to 12: One piece of gum every 4 to 8 hours. (Patient not taking: Reported on 7/14/2025) 100 each 5     omeprazole (PRILOSEC) 20 MG DR capsule Take 1 capsule (20 mg) by mouth every morning 90 capsule 3     pseudoePHEDrine (SUDAFED) 30 MG tablet Take by mouth every 4 hours as needed for congestion.       spacer (OPTICHAMBER MARIA TERESA) holding chamber Use with albuterol inhaler 1 each 0     tirzepatide (MOUNJARO) 2.5 MG/0.5ML SOAJ auto-injector pen Inject 0.5 mLs (2.5 mg) subcutaneously once a week. 6 mL 3     No current facility-administered medications for this visit.      PE:    Vitals: /66   Pulse 82   Temp 98.5  F (36.9  C) (Oral)   Resp 18   Wt 67.7 kg (149 lb 3.2 oz)   LMP  (LMP Unknown)   SpO2 93%   BMI 29.49 kg/m    BMI= Body mass index is 29.49 kg/m .     A/P:    1) Extensive SCLC:  I discussed that the patient now obviously has extensive disease.  We will start with carboplatinum, etoposide, and Tecentriq.  We will obtain a CT CAP after two cycles of therapy, and then a PET after 4 cycles of therapy.  We will plan on maintenance Tecentriq.  She could participate in the Ide[employer] study but we did not have time to discuss this today.  This would begin after a good response to 4 cycles of therapy.    I spent over 40 minutes coordinating care and discussing this therapy with the patient and her family.  The longitudinal plan of care for the diagnosis(es)/condition(s) as documented were addressed during this visit. Due to the added complexity in care, I will continue to support Herminia in the subsequent management and with ongoing continuity of care.     Again, thank you for allowing me to participate in the care of your patient.        Sincerely,        Fausto Montilla MD    Electronically signed

## 2025-07-21 NOTE — NURSING NOTE
"Oncology Rooming Note    July 21, 2025 3:06 PM   Herminia Pastor is a 69 year old female who presents for:    Chief Complaint   Patient presents with    Oncology Clinic Visit      Small cell carcinoma of lung    Blood Draw     Labs drawn via VPT by lab RN     Initial Vitals: /66   Pulse 82   Temp 98.5  F (36.9  C) (Oral)   Resp 18   Wt 67.7 kg (149 lb 3.2 oz)   LMP  (LMP Unknown)   SpO2 93%   BMI 29.49 kg/m   Estimated body mass index is 29.49 kg/m  as calculated from the following:    Height as of 7/14/25: 1.515 m (4' 11.65\").    Weight as of this encounter: 67.7 kg (149 lb 3.2 oz). Body surface area is 1.69 meters squared.  No Pain (0) Comment: Data Unavailable   No LMP recorded (lmp unknown). Patient is postmenopausal.  Allergies reviewed: Yes  Medications reviewed: Yes    Medications: Medication refills not needed today.  Pharmacy name entered into [facility]:    [id number] - [place], [place] - [address] [place] [place] [address] [facility] - [place], [place] - [address] [facility] - [place], [place] - [address] [facility] - [place], [place] - [address] [facility] #[id number] - [place], [place] - [address] [facility] [address] [place], [place] - [address]     PHQ9:  Did this patient require a PHQ9?: No      Clinical concerns: Pt has been using cough syrup recently. And is unsure where the actual coughing or tickle will come from.   Pt has been experiencing brain fog for the past week.       Malcolm Easley"

## 2025-07-21 NOTE — NURSING NOTE
Chief Complaint   Patient presents with    Oncology Clinic Visit      Small cell carcinoma of lung    Blood Draw     Labs drawn via VPT by lab RN     Venipuncture labs drawn by lab RN, vitals taken and appointment arrived.    Anel Hart RN

## 2025-07-23 RX ORDER — PROCHLORPERAZINE MALEATE 5 MG/1
5 TABLET ORAL EVERY 6 HOURS PRN
Qty: 30 TABLET | Refills: 2 | Status: SHIPPED | OUTPATIENT
Start: 2025-07-23

## 2025-07-23 RX ORDER — ONDANSETRON 8 MG/1
8 TABLET, FILM COATED ORAL EVERY 8 HOURS PRN
Qty: 30 TABLET | Refills: 2 | Status: SHIPPED | OUTPATIENT
Start: 2025-07-23

## 2025-07-24 ENCOUNTER — INFUSION THERAPY VISIT (OUTPATIENT)
Dept: ONCOLOGY | Facility: CLINIC | Age: 70
End: 2025-07-24
Attending: INTERNAL MEDICINE
Payer: COMMERCIAL

## 2025-07-24 VITALS
HEART RATE: 71 BPM | OXYGEN SATURATION: 92 % | SYSTOLIC BLOOD PRESSURE: 106 MMHG | RESPIRATION RATE: 18 BRPM | TEMPERATURE: 98.2 F | DIASTOLIC BLOOD PRESSURE: 67 MMHG

## 2025-07-24 DIAGNOSIS — Z51.11 ENCOUNTER FOR ANTINEOPLASTIC CHEMOTHERAPY: Primary | ICD-10-CM

## 2025-07-24 DIAGNOSIS — C77.8 SMALL CELL CARCINOMA OF LUNG METASTATIC TO LYMPH NODES OF MULTIPLE SITES (H): ICD-10-CM

## 2025-07-24 DIAGNOSIS — C34.90 SMALL CELL CARCINOMA OF LUNG METASTATIC TO LYMPH NODES OF MULTIPLE SITES (H): ICD-10-CM

## 2025-07-24 LAB
T4 FREE SERPL-MCNC: 0.84 NG/DL (ref 0.9–1.7)
TSH SERPL DL<=0.005 MIU/L-ACNC: 7.93 UIU/ML (ref 0.3–4.2)

## 2025-07-24 PROCEDURE — 84439 ASSAY OF FREE THYROXINE: CPT | Performed by: INTERNAL MEDICINE

## 2025-07-24 PROCEDURE — 36415 COLL VENOUS BLD VENIPUNCTURE: CPT | Performed by: INTERNAL MEDICINE

## 2025-07-24 PROCEDURE — 250N000013 HC RX MED GY IP 250 OP 250 PS 637: Performed by: INTERNAL MEDICINE

## 2025-07-24 PROCEDURE — 258N000003 HC RX IP 258 OP 636: Performed by: INTERNAL MEDICINE

## 2025-07-24 PROCEDURE — 250N000011 HC RX IP 250 OP 636: Performed by: INTERNAL MEDICINE

## 2025-07-24 PROCEDURE — 84443 ASSAY THYROID STIM HORMONE: CPT | Performed by: INTERNAL MEDICINE

## 2025-07-24 RX ORDER — LORAZEPAM 2 MG/ML
0.5 INJECTION INTRAMUSCULAR EVERY 4 HOURS PRN
Status: DISCONTINUED | OUTPATIENT
Start: 2025-07-24 | End: 2025-07-24

## 2025-07-24 RX ORDER — LORAZEPAM 0.5 MG/1
0.5 TABLET ORAL EVERY 4 HOURS PRN
Status: DISCONTINUED | OUTPATIENT
Start: 2025-07-24 | End: 2025-07-24 | Stop reason: HOSPADM

## 2025-07-24 RX ORDER — PALONOSETRON 0.05 MG/ML
0.25 INJECTION, SOLUTION INTRAVENOUS ONCE
Status: COMPLETED | OUTPATIENT
Start: 2025-07-24 | End: 2025-07-24

## 2025-07-24 RX ADMIN — ETOPOSIDE 170 MG: 20 INJECTION INTRAVENOUS at 11:23

## 2025-07-24 RX ADMIN — CARBOPLATIN 400 MG: 10 INJECTION INTRAVENOUS at 10:46

## 2025-07-24 RX ADMIN — LORAZEPAM 0.5 MG: 0.5 TABLET ORAL at 08:41

## 2025-07-24 RX ADMIN — FOSAPREPITANT: 150 INJECTION, POWDER, LYOPHILIZED, FOR SOLUTION INTRAVENOUS at 09:05

## 2025-07-24 RX ADMIN — SODIUM CHLORIDE 250 ML: 9 INJECTION, SOLUTION INTRAVENOUS at 08:45

## 2025-07-24 RX ADMIN — ATEZOLIZUMAB 1200 MG: 1200 INJECTION, SOLUTION INTRAVENOUS at 09:48

## 2025-07-24 RX ADMIN — PALONOSETRON HYDROCHLORIDE 0.25 MG: 0.25 INJECTION INTRAVENOUS at 08:47

## 2025-07-24 NOTE — PROGRESS NOTES
Infusion Nursing Note:  Herminia Pastor presents today for C1D1: Tecentriq, Carboplatin, Etoposide.    Patient seen by provider today: No. Seen by Dr. Montilla on 7/21/25   present during visit today: Not Applicable.    Note: First time getting chemotherapy today. Oriented to infusion center. Chemotherapy teaching, side effects, and schedule reviewed with patient. General chemotherapy side effects reviewed with patient and her daughter including fatigue, immunosuppression, alopecia, nausea/vomiting, constipation, and diarrhea. Individual drug common side effects reviewed with patient. Pt and her daughter instructed to call care coordinator, triage (or MD on call if after hours/weekends) with chills/temp >=100.5, questions/concerns. Pt stated understanding of plan.      Patient anxious about starting treatment.  0.5 mg p.o. Ativan given today prior to treatment with good results for help with anxiety.  Patient requesting that she receives p.o. Ativan prior to tomorrow's infusion as well.    Per Epic Secure chat messaging with DORIS Raphael CNP /WINNIE Bedolla on 7/24/25 @ 0830:  - OK to proceed with treatment today with baseline TSH of 7.93.  No interventions needed at this time.  - Ok to leave PIV in place over night for treatment days 2-3.    Intravenous Access:  Peripheral IV placed by vascular access.      Treatment Conditions:  Lab Results   Component Value Date    HGB 14.4 07/21/2025    WBC 5.8 07/21/2025    ANEU 3.8 07/21/2025     07/21/2025        Lab Results   Component Value Date     07/21/2025    POTASSIUM 4.0 07/21/2025    MAG 2.5 (H) 08/19/2014    CR 0.72 07/21/2025    ELIAS 9.3 07/21/2025    BILITOTAL 0.2 07/21/2025    ALBUMIN 4.0 07/21/2025    ALT 15 07/21/2025    AST 24 07/21/2025       Results reviewed, labs MET treatment parameters, ok to proceed with treatment.      Post Infusion Assessment:  Patient tolerated infusion without incident.  Blood return noted pre and post infusion.  Site  patent and intact, free from redness, edema or discomfort.  No evidence of extravasations.  Access discontinued per protocol.       Discharge Plan:   Prescription refills given for Compazine Zofran.  Discharge instructions reviewed with: Patient and Family.  Patient and/or family verbalized understanding of discharge instructions and all questions answered.  Copy of AVS reviewed with patient and/or family.  Patient will return 7/25/25 for C1D2: Etoposide for next appointment.  Patient discharged in stable condition accompanied by: daughter.  Departure Mode: Ambulatory.      Dana Bedolla RN

## 2025-07-26 ENCOUNTER — INFUSION THERAPY VISIT (OUTPATIENT)
Dept: ONCOLOGY | Facility: CLINIC | Age: 70
End: 2025-07-26
Attending: INTERNAL MEDICINE
Payer: COMMERCIAL

## 2025-07-26 ENCOUNTER — HEALTH MAINTENANCE LETTER (OUTPATIENT)
Age: 70
End: 2025-07-26

## 2025-07-26 VITALS
OXYGEN SATURATION: 93 % | DIASTOLIC BLOOD PRESSURE: 76 MMHG | HEART RATE: 82 BPM | TEMPERATURE: 98 F | SYSTOLIC BLOOD PRESSURE: 117 MMHG | RESPIRATION RATE: 16 BRPM

## 2025-07-26 DIAGNOSIS — C34.90 SMALL CELL CARCINOMA OF LUNG METASTATIC TO LYMPH NODES OF MULTIPLE SITES (H): Primary | ICD-10-CM

## 2025-07-26 DIAGNOSIS — Z51.11 ENCOUNTER FOR ANTINEOPLASTIC CHEMOTHERAPY: ICD-10-CM

## 2025-07-26 DIAGNOSIS — C77.8 SMALL CELL CARCINOMA OF LUNG METASTATIC TO LYMPH NODES OF MULTIPLE SITES (H): Primary | ICD-10-CM

## 2025-07-26 PROCEDURE — 250N000011 HC RX IP 250 OP 636: Performed by: INTERNAL MEDICINE

## 2025-07-26 PROCEDURE — 96367 TX/PROPH/DG ADDL SEQ IV INF: CPT

## 2025-07-26 PROCEDURE — 258N000003 HC RX IP 258 OP 636: Performed by: INTERNAL MEDICINE

## 2025-07-26 PROCEDURE — 96413 CHEMO IV INFUSION 1 HR: CPT

## 2025-07-26 RX ADMIN — DEXAMETHASONE SODIUM PHOSPHATE 12 MG: 10 INJECTION, SOLUTION INTRAMUSCULAR; INTRAVENOUS at 10:24

## 2025-07-26 RX ADMIN — ETOPOSIDE 170 MG: 20 INJECTION INTRAVENOUS at 10:42

## 2025-07-26 ASSESSMENT — PAIN SCALES - GENERAL: PAINLEVEL_OUTOF10: NO PAIN (0)

## 2025-07-26 NOTE — PROGRESS NOTES
Infusion Nursing Note:  Herminia RODRIGUEZ Arturoryley presents today for Cycle 1 Day 3 Etoposide.    Patient seen by provider today: No   present during visit today: Not Applicable.    Note: No new or acute issues overnight; wishes to proceed with treatment.      Intravenous Access:  Peripheral IV placed.    Treatment Conditions:  N/a.      Post Infusion Assessment:  Patient tolerated infusion without incident.  Blood return noted pre and post infusion.       Discharge Plan:   Discharge instructions reviewed with: Patient.  Patient will return 8/13 for next appointment.  Patient discharged in stable condition accompanied by: daughter.  Departure Mode: Ambulatory.      Alnia Carrera RN

## 2025-07-28 ENCOUNTER — NURSE TRIAGE (OUTPATIENT)
Dept: ONCOLOGY | Facility: CLINIC | Age: 70
End: 2025-07-28
Payer: COMMERCIAL

## 2025-07-28 NOTE — TELEPHONE ENCOUNTER
Herminia states she called about Neulasta shot for tomorrow, pt is wondering who to call if has side effects of Neulasta after tomorrow.    This writer educated to call [phone] Opt 5 opt 2 and ask to speak to a triage nurse about symptoms.    Pt has ongoing nagging cough, the same level of cough as reported to 7/21/2025 with Dr. Montilla.      Pt is wondering when to expect to improvement in the coughing fits as discussed with Dr. Montilla on 7/21/2025.  Does Dr. Montilla have any other suggestions for managing nagging cough.   Denies fever, chest pain or any increase in SOB.     Copied and past from last provider visit.          This writer educated that air quality has not been great past few days so encouraged to wear mask if out in the environment. Try cough drops with Pectin vs menthol b/c menthol could dry out mouth.     Will send this request for DR. Montilla to reply to patient via [employer] on if he has any other suggestions beyond what was discussed on 7/21 since has ongoing cough.

## 2025-07-29 ENCOUNTER — INFUSION THERAPY VISIT (OUTPATIENT)
Dept: ONCOLOGY | Facility: CLINIC | Age: 70
End: 2025-07-29
Attending: INTERNAL MEDICINE
Payer: COMMERCIAL

## 2025-07-29 VITALS
WEIGHT: 148.9 LBS | SYSTOLIC BLOOD PRESSURE: 128 MMHG | RESPIRATION RATE: 16 BRPM | BODY MASS INDEX: 29.43 KG/M2 | TEMPERATURE: 98.3 F | OXYGEN SATURATION: 92 % | DIASTOLIC BLOOD PRESSURE: 72 MMHG | HEART RATE: 72 BPM

## 2025-07-29 DIAGNOSIS — Z51.11 ENCOUNTER FOR ANTINEOPLASTIC CHEMOTHERAPY: ICD-10-CM

## 2025-07-29 DIAGNOSIS — C34.90 SMALL CELL CARCINOMA OF LUNG METASTATIC TO LYMPH NODES OF MULTIPLE SITES (H): Primary | ICD-10-CM

## 2025-07-29 DIAGNOSIS — C77.8 SMALL CELL CARCINOMA OF LUNG METASTATIC TO LYMPH NODES OF MULTIPLE SITES (H): Primary | ICD-10-CM

## 2025-07-29 PROCEDURE — 96372 THER/PROPH/DIAG INJ SC/IM: CPT | Performed by: INTERNAL MEDICINE

## 2025-07-29 PROCEDURE — 250N000011 HC RX IP 250 OP 636: Mod: JZ | Performed by: INTERNAL MEDICINE

## 2025-07-29 RX ADMIN — PEGFILGRASTIM 6 MG: 6 INJECTION SUBCUTANEOUS at 12:59

## 2025-07-29 NOTE — PROGRESS NOTES
Infusion Injection Note:  Herminia Pastor presents today for Neulasta.    Patient seen by provider today: No   present during visit today: Not Applicable.    Patient declined to speak with an RN today. Upon greeting patient, patient asked author about the difference between an injection and infusion. Author explained that an injection is a dose of medication administered with a needle and syringe, whereas an infusion is medication/chemo, fluids or blood products administered intervenously. Author explained that while injections are different than infusions, both are administered in the infusion area, therefore injection appointments are labeled as oncology infusion appointments.Considering today's treatment is the first time patient is receiving Neulasta, author confirmed patient's understanding of its function, potential side effects and route of administration. Author conveyed that beyond today's visit, her provider prescribed Cosela instead of Neulasta, which functions similar to Neulasta, but is given intervenously; patient will receive Cosela in conjunction with her chemo therapy treatments. Author informed patient that a common side effect of Neulasta is bone pain, for which she should take Claritin or Zyrtec to alleviate. Patient mentioned she currently takes a decongestant with an antihistamine for her allergies.    Treatment Conditions:  Not Applicable.    Pre and Post Injection:  Neulasta injection given to Left Arm without incident.   Patient tolerated procedure well.    Discharge Plan:  Patient declined prescription refills.  AVS to patient via [employer]. Patient's next appointment is 8/13/2025, for which she'll likely have her daughter reschedule to a time that isn't so early in the morning.  Patient discharged in stable condition accompanied by: self.  Departure Mode: Ambulatory.

## 2025-07-31 PROBLEM — T45.1X5A CHEMOTHERAPY-INDUCED NEUTROPENIA: Status: ACTIVE | Noted: 2025-07-31

## 2025-07-31 PROBLEM — D70.1 CHEMOTHERAPY-INDUCED NEUTROPENIA: Status: ACTIVE | Noted: 2025-07-31

## 2025-08-05 DIAGNOSIS — R06.2 WHEEZING: ICD-10-CM

## 2025-08-05 DIAGNOSIS — R91.8 MASS OF LEFT LUNG: ICD-10-CM

## 2025-08-05 DIAGNOSIS — F17.200 NICOTINE DEPENDENCE, UNCOMPLICATED, UNSPECIFIED NICOTINE PRODUCT TYPE: ICD-10-CM

## 2025-08-06 RX ORDER — NICOTINE 21 MG/24HR
1 PATCH, TRANSDERMAL 24 HOURS TRANSDERMAL EVERY 24 HOURS
Qty: 42 PATCH | Refills: 0 | Status: SHIPPED | OUTPATIENT
Start: 2025-08-06

## 2025-08-07 ENCOUNTER — PATIENT OUTREACH (OUTPATIENT)
Dept: CARE COORDINATION | Facility: CLINIC | Age: 70
End: 2025-08-07
Payer: COMMERCIAL

## 2025-08-13 ENCOUNTER — ONCOLOGY VISIT (OUTPATIENT)
Dept: ONCOLOGY | Facility: CLINIC | Age: 70
End: 2025-08-13
Attending: NURSE PRACTITIONER
Payer: COMMERCIAL

## 2025-08-13 ENCOUNTER — APPOINTMENT (OUTPATIENT)
Dept: ULTRASOUND IMAGING | Facility: CLINIC | Age: 70
End: 2025-08-13
Attending: EMERGENCY MEDICINE
Payer: COMMERCIAL

## 2025-08-13 ENCOUNTER — APPOINTMENT (OUTPATIENT)
Dept: LAB | Facility: CLINIC | Age: 70
End: 2025-08-13
Attending: INTERNAL MEDICINE
Payer: COMMERCIAL

## 2025-08-13 ENCOUNTER — APPOINTMENT (OUTPATIENT)
Dept: CT IMAGING | Facility: CLINIC | Age: 70
End: 2025-08-13
Attending: EMERGENCY MEDICINE
Payer: COMMERCIAL

## 2025-08-13 ENCOUNTER — INFUSION THERAPY VISIT (OUTPATIENT)
Dept: ONCOLOGY | Facility: CLINIC | Age: 70
End: 2025-08-13
Attending: INTERNAL MEDICINE
Payer: COMMERCIAL

## 2025-08-13 ENCOUNTER — DOCUMENTATION ONLY (OUTPATIENT)
Dept: ONCOLOGY | Facility: CLINIC | Age: 70
End: 2025-08-13

## 2025-08-13 ENCOUNTER — HOSPITAL ENCOUNTER (OUTPATIENT)
Facility: CLINIC | Age: 70
Setting detail: OBSERVATION
End: 2025-08-13
Attending: STUDENT IN AN ORGANIZED HEALTH CARE EDUCATION/TRAINING PROGRAM
Payer: COMMERCIAL

## 2025-08-13 VITALS — HEART RATE: 84 BPM | SYSTOLIC BLOOD PRESSURE: 143 MMHG | DIASTOLIC BLOOD PRESSURE: 83 MMHG | OXYGEN SATURATION: 98 %

## 2025-08-13 VITALS
SYSTOLIC BLOOD PRESSURE: 126 MMHG | HEART RATE: 82 BPM | RESPIRATION RATE: 16 BRPM | WEIGHT: 150.6 LBS | DIASTOLIC BLOOD PRESSURE: 65 MMHG | TEMPERATURE: 98.9 F | BODY MASS INDEX: 29.76 KG/M2 | OXYGEN SATURATION: 93 %

## 2025-08-13 DIAGNOSIS — C77.8 SMALL CELL CARCINOMA OF LUNG METASTATIC TO LYMPH NODES OF MULTIPLE SITES (H): ICD-10-CM

## 2025-08-13 DIAGNOSIS — Z71.89 OTHER SPECIFIED COUNSELING: Chronic | ICD-10-CM

## 2025-08-13 DIAGNOSIS — C77.8 SMALL CELL CARCINOMA OF LUNG METASTATIC TO LYMPH NODES OF MULTIPLE SITES (H): Primary | ICD-10-CM

## 2025-08-13 DIAGNOSIS — R55 SYNCOPE AND COLLAPSE: Primary | ICD-10-CM

## 2025-08-13 DIAGNOSIS — Z51.11 ENCOUNTER FOR ANTINEOPLASTIC CHEMOTHERAPY: Primary | ICD-10-CM

## 2025-08-13 DIAGNOSIS — T45.1X5A CHEMOTHERAPY-INDUCED NEUTROPENIA: ICD-10-CM

## 2025-08-13 DIAGNOSIS — C34.90 SMALL CELL CARCINOMA OF LUNG METASTATIC TO LYMPH NODES OF MULTIPLE SITES (H): ICD-10-CM

## 2025-08-13 DIAGNOSIS — I10 BENIGN ESSENTIAL HYPERTENSION: ICD-10-CM

## 2025-08-13 DIAGNOSIS — D70.1 CHEMOTHERAPY-INDUCED NEUTROPENIA: ICD-10-CM

## 2025-08-13 DIAGNOSIS — C34.90 SMALL CELL CARCINOMA OF LUNG METASTATIC TO LYMPH NODES OF MULTIPLE SITES (H): Primary | ICD-10-CM

## 2025-08-13 LAB
ALBUMIN SERPL BCG-MCNC: 3.9 G/DL (ref 3.5–5.2)
ALBUMIN SERPL BCG-MCNC: 4.1 G/DL (ref 3.5–5.2)
ALP SERPL-CCNC: 114 U/L (ref 40–150)
ALP SERPL-CCNC: 114 U/L (ref 40–150)
ALT SERPL W P-5'-P-CCNC: 14 U/L (ref 0–50)
ALT SERPL W P-5'-P-CCNC: 17 U/L (ref 0–50)
ANION GAP SERPL CALCULATED.3IONS-SCNC: 14 MMOL/L (ref 7–15)
ANION GAP SERPL CALCULATED.3IONS-SCNC: 9 MMOL/L (ref 7–15)
AST SERPL W P-5'-P-CCNC: 21 U/L (ref 0–45)
AST SERPL W P-5'-P-CCNC: 22 U/L (ref 0–45)
ATRIAL RATE - MUSE: 76 BPM
BASOPHILS # BLD AUTO: 0.04 10E3/UL (ref 0–0.2)
BASOPHILS # BLD AUTO: 0.05 10E3/UL (ref 0–0.2)
BASOPHILS NFR BLD AUTO: 0.5 %
BASOPHILS NFR BLD AUTO: 0.7 %
BILIRUB SERPL-MCNC: 0.2 MG/DL
BILIRUB SERPL-MCNC: 0.2 MG/DL
BUN SERPL-MCNC: 11.6 MG/DL (ref 8–23)
BUN SERPL-MCNC: 12.4 MG/DL (ref 8–23)
CALCIUM SERPL-MCNC: 8.7 MG/DL (ref 8.8–10.4)
CALCIUM SERPL-MCNC: 9.1 MG/DL (ref 8.8–10.4)
CHLORIDE SERPL-SCNC: 104 MMOL/L (ref 98–107)
CHLORIDE SERPL-SCNC: 106 MMOL/L (ref 98–107)
CREAT SERPL-MCNC: 0.63 MG/DL (ref 0.51–0.95)
CREAT SERPL-MCNC: 0.69 MG/DL (ref 0.51–0.95)
DIASTOLIC BLOOD PRESSURE - MUSE: NORMAL MMHG
EGFRCR SERPLBLD CKD-EPI 2021: >90 ML/MIN/1.73M2
EGFRCR SERPLBLD CKD-EPI 2021: >90 ML/MIN/1.73M2
EOSINOPHIL # BLD AUTO: 0.02 10E3/UL (ref 0–0.7)
EOSINOPHIL # BLD AUTO: 0.03 10E3/UL (ref 0–0.7)
EOSINOPHIL NFR BLD AUTO: 0.2 %
EOSINOPHIL NFR BLD AUTO: 0.4 %
ERYTHROCYTE [DISTWIDTH] IN BLOOD BY AUTOMATED COUNT: 15.7 % (ref 10–15)
ERYTHROCYTE [DISTWIDTH] IN BLOOD BY AUTOMATED COUNT: 15.8 % (ref 10–15)
GLUCOSE SERPL-MCNC: 121 MG/DL (ref 70–99)
GLUCOSE SERPL-MCNC: 137 MG/DL (ref 70–99)
HCO3 SERPL-SCNC: 24 MMOL/L (ref 22–29)
HCO3 SERPL-SCNC: 25 MMOL/L (ref 22–29)
HCT VFR BLD AUTO: 42.9 % (ref 35–47)
HCT VFR BLD AUTO: 42.9 % (ref 35–47)
HGB BLD-MCNC: 13.4 G/DL (ref 11.7–15.7)
HGB BLD-MCNC: 13.6 G/DL (ref 11.7–15.7)
HOLD SPECIMEN: NORMAL
IMM GRANULOCYTES # BLD: 0.08 10E3/UL
IMM GRANULOCYTES # BLD: 0.14 10E3/UL
IMM GRANULOCYTES NFR BLD: 1.1 %
IMM GRANULOCYTES NFR BLD: 1.7 %
INTERPRETATION ECG - MUSE: NORMAL
LACTATE SERPL-SCNC: 1.5 MMOL/L (ref 0.7–2)
LYMPHOCYTES # BLD AUTO: 0.95 10E3/UL (ref 0.8–5.3)
LYMPHOCYTES # BLD AUTO: 1.39 10E3/UL (ref 0.8–5.3)
LYMPHOCYTES NFR BLD AUTO: 11.8 %
LYMPHOCYTES NFR BLD AUTO: 19.1 %
MAGNESIUM SERPL-MCNC: 1.9 MG/DL (ref 1.7–2.3)
MCH RBC QN AUTO: 28 PG (ref 26.5–33)
MCH RBC QN AUTO: 28.2 PG (ref 26.5–33)
MCHC RBC AUTO-ENTMCNC: 31.2 G/DL (ref 31.5–36.5)
MCHC RBC AUTO-ENTMCNC: 31.7 G/DL (ref 31.5–36.5)
MCV RBC AUTO: 88.5 FL (ref 78–100)
MCV RBC AUTO: 90.1 FL (ref 78–100)
MONOCYTES # BLD AUTO: 0.3 10E3/UL (ref 0–1.3)
MONOCYTES # BLD AUTO: 0.57 10E3/UL (ref 0–1.3)
MONOCYTES NFR BLD AUTO: 3.7 %
MONOCYTES NFR BLD AUTO: 7.8 %
NEUTROPHILS # BLD AUTO: 5.16 10E3/UL (ref 1.6–8.3)
NEUTROPHILS # BLD AUTO: 6.57 10E3/UL (ref 1.6–8.3)
NEUTROPHILS NFR BLD AUTO: 70.9 %
NEUTROPHILS NFR BLD AUTO: 82.1 %
NRBC # BLD AUTO: 0 10E3/UL
NRBC # BLD AUTO: 0 10E3/UL
NRBC BLD AUTO-RTO: 0 /100
NRBC BLD AUTO-RTO: 0 /100
P AXIS - MUSE: 42 DEGREES
PLATELET # BLD AUTO: 188 10E3/UL (ref 150–450)
PLATELET # BLD AUTO: 194 10E3/UL (ref 150–450)
POTASSIUM SERPL-SCNC: 4.1 MMOL/L (ref 3.4–5.3)
POTASSIUM SERPL-SCNC: 4.1 MMOL/L (ref 3.4–5.3)
PR INTERVAL - MUSE: 168 MS
PROT SERPL-MCNC: 7 G/DL (ref 6.4–8.3)
PROT SERPL-MCNC: 7 G/DL (ref 6.4–8.3)
QRS DURATION - MUSE: 84 MS
QT - MUSE: 404 MS
QTC - MUSE: 454 MS
R AXIS - MUSE: 33 DEGREES
RBC # BLD AUTO: 4.76 10E6/UL (ref 3.8–5.2)
RBC # BLD AUTO: 4.85 10E6/UL (ref 3.8–5.2)
SODIUM SERPL-SCNC: 140 MMOL/L (ref 135–145)
SODIUM SERPL-SCNC: 142 MMOL/L (ref 135–145)
SYSTOLIC BLOOD PRESSURE - MUSE: NORMAL MMHG
T AXIS - MUSE: 56 DEGREES
T4 FREE SERPL-MCNC: 0.94 NG/DL (ref 0.9–1.7)
TROPONIN T SERPL HS-MCNC: 7 NG/L
TROPONIN T SERPL HS-MCNC: <6 NG/L
TSH SERPL DL<=0.005 MIU/L-ACNC: 4.73 UIU/ML (ref 0.3–4.2)
VENTRICULAR RATE- MUSE: 76 BPM
WBC # BLD AUTO: 7.28 10E3/UL (ref 4–11)
WBC # BLD AUTO: 8.02 10E3/UL (ref 4–11)

## 2025-08-13 PROCEDURE — 71275 CT ANGIOGRAPHY CHEST: CPT | Mod: 26 | Performed by: STUDENT IN AN ORGANIZED HEALTH CARE EDUCATION/TRAINING PROGRAM

## 2025-08-13 PROCEDURE — 250N000011 HC RX IP 250 OP 636: Performed by: EMERGENCY MEDICINE

## 2025-08-13 PROCEDURE — 93880 EXTRACRANIAL BILAT STUDY: CPT

## 2025-08-13 PROCEDURE — 84155 ASSAY OF PROTEIN SERUM: CPT | Performed by: EMERGENCY MEDICINE

## 2025-08-13 PROCEDURE — 71275 CT ANGIOGRAPHY CHEST: CPT

## 2025-08-13 PROCEDURE — 250N000011 HC RX IP 250 OP 636: Performed by: INTERNAL MEDICINE

## 2025-08-13 PROCEDURE — 84443 ASSAY THYROID STIM HORMONE: CPT | Performed by: INTERNAL MEDICINE

## 2025-08-13 PROCEDURE — 84439 ASSAY OF FREE THYROXINE: CPT | Performed by: INTERNAL MEDICINE

## 2025-08-13 PROCEDURE — 36415 COLL VENOUS BLD VENIPUNCTURE: CPT | Performed by: EMERGENCY MEDICINE

## 2025-08-13 PROCEDURE — 84484 ASSAY OF TROPONIN QUANT: CPT | Performed by: EMERGENCY MEDICINE

## 2025-08-13 PROCEDURE — 36415 COLL VENOUS BLD VENIPUNCTURE: CPT | Performed by: INTERNAL MEDICINE

## 2025-08-13 PROCEDURE — 83735 ASSAY OF MAGNESIUM: CPT | Performed by: EMERGENCY MEDICINE

## 2025-08-13 PROCEDURE — 96361 HYDRATE IV INFUSION ADD-ON: CPT

## 2025-08-13 PROCEDURE — 93005 ELECTROCARDIOGRAM TRACING: CPT

## 2025-08-13 PROCEDURE — 96360 HYDRATION IV INFUSION INIT: CPT

## 2025-08-13 PROCEDURE — 93880 EXTRACRANIAL BILAT STUDY: CPT | Mod: 26 | Performed by: STUDENT IN AN ORGANIZED HEALTH CARE EDUCATION/TRAINING PROGRAM

## 2025-08-13 PROCEDURE — 258N000003 HC RX IP 258 OP 636: Performed by: INTERNAL MEDICINE

## 2025-08-13 PROCEDURE — 99222 1ST HOSP IP/OBS MODERATE 55: CPT | Mod: GC | Performed by: PSYCHIATRY & NEUROLOGY

## 2025-08-13 PROCEDURE — 99285 EMERGENCY DEPT VISIT HI MDM: CPT | Mod: 25 | Performed by: STUDENT IN AN ORGANIZED HEALTH CARE EDUCATION/TRAINING PROGRAM

## 2025-08-13 PROCEDURE — 85014 HEMATOCRIT: CPT | Performed by: INTERNAL MEDICINE

## 2025-08-13 PROCEDURE — 250N000009 HC RX 250: Performed by: PHYSICIAN ASSISTANT

## 2025-08-13 PROCEDURE — 70450 CT HEAD/BRAIN W/O DYE: CPT | Mod: 26 | Performed by: RADIOLOGY

## 2025-08-13 PROCEDURE — 258N000003 HC RX IP 258 OP 636: Performed by: EMERGENCY MEDICINE

## 2025-08-13 PROCEDURE — 94640 AIRWAY INHALATION TREATMENT: CPT

## 2025-08-13 PROCEDURE — 83605 ASSAY OF LACTIC ACID: CPT | Performed by: EMERGENCY MEDICINE

## 2025-08-13 PROCEDURE — 82247 BILIRUBIN TOTAL: CPT | Performed by: INTERNAL MEDICINE

## 2025-08-13 PROCEDURE — 99285 EMERGENCY DEPT VISIT HI MDM: CPT | Performed by: EMERGENCY MEDICINE

## 2025-08-13 PROCEDURE — 258N000003 HC RX IP 258 OP 636: Performed by: PHYSICIAN ASSISTANT

## 2025-08-13 PROCEDURE — 70450 CT HEAD/BRAIN W/O DYE: CPT

## 2025-08-13 PROCEDURE — G0378 HOSPITAL OBSERVATION PER HR: HCPCS

## 2025-08-13 PROCEDURE — 85004 AUTOMATED DIFF WBC COUNT: CPT | Performed by: EMERGENCY MEDICINE

## 2025-08-13 PROCEDURE — 87040 BLOOD CULTURE FOR BACTERIA: CPT | Performed by: NURSE PRACTITIONER

## 2025-08-13 PROCEDURE — 99207 PR APP CREDIT; MD BILLING SHARED VISIT: CPT

## 2025-08-13 PROCEDURE — G0463 HOSPITAL OUTPT CLINIC VISIT: HCPCS | Performed by: NURSE PRACTITIONER

## 2025-08-13 RX ORDER — IPRATROPIUM BROMIDE AND ALBUTEROL SULFATE 2.5; .5 MG/3ML; MG/3ML
3 SOLUTION RESPIRATORY (INHALATION) ONCE
Status: COMPLETED | OUTPATIENT
Start: 2025-08-13 | End: 2025-08-13

## 2025-08-13 RX ORDER — IPRATROPIUM BROMIDE AND ALBUTEROL SULFATE 2.5; .5 MG/3ML; MG/3ML
3 SOLUTION RESPIRATORY (INHALATION) EVERY 4 HOURS PRN
Status: DISCONTINUED | OUTPATIENT
Start: 2025-08-13 | End: 2025-08-16 | Stop reason: HOSPADM

## 2025-08-13 RX ORDER — IOPAMIDOL 755 MG/ML
55 INJECTION, SOLUTION INTRAVASCULAR ONCE
Status: COMPLETED | OUTPATIENT
Start: 2025-08-13 | End: 2025-08-13

## 2025-08-13 RX ORDER — PALONOSETRON 0.05 MG/ML
0.25 INJECTION, SOLUTION INTRAVENOUS ONCE
Status: COMPLETED | OUTPATIENT
Start: 2025-08-13 | End: 2025-08-13

## 2025-08-13 RX ORDER — HEPARIN SODIUM (PORCINE) LOCK FLUSH IV SOLN 100 UNIT/ML 100 UNIT/ML
5 SOLUTION INTRAVENOUS
Status: DISCONTINUED | OUTPATIENT
Start: 2025-08-13 | End: 2025-08-13 | Stop reason: HOSPADM

## 2025-08-13 RX ORDER — ATORVASTATIN CALCIUM 10 MG/1
10 TABLET, FILM COATED ORAL DAILY
Status: DISCONTINUED | OUTPATIENT
Start: 2025-08-14 | End: 2025-08-13

## 2025-08-13 RX ORDER — SODIUM CHLORIDE 9 MG/ML
INJECTION, SOLUTION INTRAVENOUS CONTINUOUS
Status: DISCONTINUED | OUTPATIENT
Start: 2025-08-13 | End: 2025-08-14

## 2025-08-13 RX ORDER — PANTOPRAZOLE SODIUM 40 MG/1
40 TABLET, DELAYED RELEASE ORAL
Status: DISCONTINUED | OUTPATIENT
Start: 2025-08-14 | End: 2025-08-16 | Stop reason: HOSPADM

## 2025-08-13 RX ORDER — ALBUTEROL SULFATE 90 UG/1
1-2 INHALANT RESPIRATORY (INHALATION) EVERY 4 HOURS PRN
Status: DISCONTINUED | OUTPATIENT
Start: 2025-08-13 | End: 2025-08-13

## 2025-08-13 RX ORDER — BENZONATATE 100 MG/1
100 CAPSULE ORAL 3 TIMES DAILY PRN
Qty: 90 CAPSULE | Refills: 1 | Status: ON HOLD | OUTPATIENT
Start: 2025-08-13

## 2025-08-13 RX ORDER — OMEPRAZOLE 20 MG/1
20 CAPSULE, DELAYED RELEASE ORAL EVERY MORNING
Status: DISCONTINUED | OUTPATIENT
Start: 2025-08-14 | End: 2025-08-13

## 2025-08-13 RX ADMIN — IOPAMIDOL 55 ML: 755 INJECTION, SOLUTION INTRAVENOUS at 16:43

## 2025-08-13 RX ADMIN — SODIUM CHLORIDE 1000 ML: 0.9 INJECTION, SOLUTION INTRAVENOUS at 16:09

## 2025-08-13 RX ADMIN — IPRATROPIUM BROMIDE AND ALBUTEROL SULFATE 3 ML: .5; 3 SOLUTION RESPIRATORY (INHALATION) at 22:01

## 2025-08-13 RX ADMIN — SODIUM CHLORIDE: 0.9 INJECTION, SOLUTION INTRAVENOUS at 20:42

## 2025-08-13 RX ADMIN — PALONOSETRON HYDROCHLORIDE 0.25 MG: 0.25 INJECTION INTRAVENOUS at 08:47

## 2025-08-13 RX ADMIN — FOSAPREPITANT: 150 INJECTION, POWDER, LYOPHILIZED, FOR SOLUTION INTRAVENOUS at 08:49

## 2025-08-13 ASSESSMENT — ACTIVITIES OF DAILY LIVING (ADL)
ADLS_ACUITY_SCORE: 41
ADLS_ACUITY_SCORE: 42
ADLS_ACUITY_SCORE: 42
ADLS_ACUITY_SCORE: 41
ADLS_ACUITY_SCORE: 42
ADLS_ACUITY_SCORE: 51
ADLS_ACUITY_SCORE: 51
ADLS_ACUITY_SCORE: 42
ADLS_ACUITY_SCORE: 47

## 2025-08-13 ASSESSMENT — PAIN SCALES - GENERAL: PAINLEVEL_OUTOF10: NO PAIN (0)

## 2025-08-14 ENCOUNTER — APPOINTMENT (OUTPATIENT)
Dept: CARDIOLOGY | Facility: CLINIC | Age: 70
End: 2025-08-14
Attending: PHYSICIAN ASSISTANT
Payer: COMMERCIAL

## 2025-08-14 ENCOUNTER — APPOINTMENT (OUTPATIENT)
Dept: PHYSICAL THERAPY | Facility: CLINIC | Age: 70
End: 2025-08-14
Attending: PHYSICIAN ASSISTANT
Payer: COMMERCIAL

## 2025-08-14 VITALS
OXYGEN SATURATION: 94 % | WEIGHT: 152.56 LBS | BODY MASS INDEX: 30.15 KG/M2 | RESPIRATION RATE: 16 BRPM | HEART RATE: 72 BPM | DIASTOLIC BLOOD PRESSURE: 68 MMHG | TEMPERATURE: 97.8 F | SYSTOLIC BLOOD PRESSURE: 133 MMHG

## 2025-08-14 LAB
ANION GAP SERPL CALCULATED.3IONS-SCNC: 11 MMOL/L (ref 7–15)
ATRIAL RATE - MUSE: 69 BPM
BACTERIA SPEC CULT: NORMAL
BUN SERPL-MCNC: 10.2 MG/DL (ref 8–23)
CALCIUM SERPL-MCNC: 8.6 MG/DL (ref 8.8–10.4)
CHLORIDE SERPL-SCNC: 110 MMOL/L (ref 98–107)
CREAT SERPL-MCNC: 0.53 MG/DL (ref 0.51–0.95)
DIASTOLIC BLOOD PRESSURE - MUSE: NORMAL MMHG
EGFRCR SERPLBLD CKD-EPI 2021: >90 ML/MIN/1.73M2
ERYTHROCYTE [DISTWIDTH] IN BLOOD BY AUTOMATED COUNT: 15.5 % (ref 10–15)
GLUCOSE SERPL-MCNC: 199 MG/DL (ref 70–99)
HCO3 SERPL-SCNC: 23 MMOL/L (ref 22–29)
HCT VFR BLD AUTO: 37.4 % (ref 35–47)
HGB BLD-MCNC: 12.1 G/DL (ref 11.7–15.7)
HOLD SPECIMEN: NORMAL
INTERPRETATION ECG - MUSE: NORMAL
LVEF ECHO: NORMAL
MCH RBC QN AUTO: 28.1 PG (ref 26.5–33)
MCHC RBC AUTO-ENTMCNC: 32.4 G/DL (ref 31.5–36.5)
MCV RBC AUTO: 87 FL (ref 78–100)
NT-PROBNP SERPL-MCNC: 122 PG/ML (ref 0–353)
P AXIS - MUSE: 64 DEGREES
PLATELET # BLD AUTO: 203 10E3/UL (ref 150–450)
POTASSIUM SERPL-SCNC: 4 MMOL/L (ref 3.4–5.3)
PR INTERVAL - MUSE: 198 MS
QRS DURATION - MUSE: 88 MS
QT - MUSE: 416 MS
QTC - MUSE: 445 MS
R AXIS - MUSE: 38 DEGREES
RBC # BLD AUTO: 4.3 10E6/UL (ref 3.8–5.2)
SODIUM SERPL-SCNC: 144 MMOL/L (ref 135–145)
SYSTOLIC BLOOD PRESSURE - MUSE: NORMAL MMHG
T AXIS - MUSE: 59 DEGREES
TROPONIN T SERPL HS-MCNC: <6 NG/L
VENTRICULAR RATE- MUSE: 69 BPM
WBC # BLD AUTO: 13.75 10E3/UL (ref 4–11)

## 2025-08-14 PROCEDURE — 255N000002 HC RX 255 OP 636: Performed by: INTERNAL MEDICINE

## 2025-08-14 PROCEDURE — 83880 ASSAY OF NATRIURETIC PEPTIDE: CPT | Performed by: PHYSICIAN ASSISTANT

## 2025-08-14 PROCEDURE — 96361 HYDRATE IV INFUSION ADD-ON: CPT

## 2025-08-14 PROCEDURE — G0378 HOSPITAL OBSERVATION PER HR: HCPCS

## 2025-08-14 PROCEDURE — 250N000013 HC RX MED GY IP 250 OP 250 PS 637: Performed by: PHYSICIAN ASSISTANT

## 2025-08-14 PROCEDURE — 258N000003 HC RX IP 258 OP 636: Performed by: PHYSICIAN ASSISTANT

## 2025-08-14 PROCEDURE — 36415 COLL VENOUS BLD VENIPUNCTURE: CPT | Performed by: PHYSICIAN ASSISTANT

## 2025-08-14 PROCEDURE — 99233 SBSQ HOSP IP/OBS HIGH 50: CPT | Mod: FS | Performed by: PHYSICIAN ASSISTANT

## 2025-08-14 PROCEDURE — 93005 ELECTROCARDIOGRAM TRACING: CPT

## 2025-08-14 PROCEDURE — 99215 OFFICE O/P EST HI 40 MIN: CPT | Mod: GC

## 2025-08-14 PROCEDURE — 93306 TTE W/DOPPLER COMPLETE: CPT | Mod: 26 | Performed by: INTERNAL MEDICINE

## 2025-08-14 PROCEDURE — 250N000013 HC RX MED GY IP 250 OP 250 PS 637

## 2025-08-14 PROCEDURE — 97161 PT EVAL LOW COMPLEX 20 MIN: CPT | Mod: GP

## 2025-08-14 PROCEDURE — 85014 HEMATOCRIT: CPT | Performed by: PHYSICIAN ASSISTANT

## 2025-08-14 PROCEDURE — 97530 THERAPEUTIC ACTIVITIES: CPT | Mod: GP

## 2025-08-14 PROCEDURE — 999N000208 ECHOCARDIOGRAM COMPLETE

## 2025-08-14 PROCEDURE — 99207 PR APP CREDIT; MD BILLING SHARED VISIT: CPT | Performed by: STUDENT IN AN ORGANIZED HEALTH CARE EDUCATION/TRAINING PROGRAM

## 2025-08-14 PROCEDURE — 99417 PROLNG OP E/M EACH 15 MIN: CPT | Mod: GC

## 2025-08-14 PROCEDURE — 84484 ASSAY OF TROPONIN QUANT: CPT | Performed by: PHYSICIAN ASSISTANT

## 2025-08-14 PROCEDURE — 80048 BASIC METABOLIC PNL TOTAL CA: CPT | Performed by: PHYSICIAN ASSISTANT

## 2025-08-14 RX ORDER — HYDROXYZINE HYDROCHLORIDE 50 MG/1
50 TABLET, FILM COATED ORAL EVERY 6 HOURS PRN
Status: DISCONTINUED | OUTPATIENT
Start: 2025-08-14 | End: 2025-08-16 | Stop reason: HOSPADM

## 2025-08-14 RX ORDER — HYDROXYZINE HYDROCHLORIDE 25 MG/1
25 TABLET, FILM COATED ORAL EVERY 6 HOURS PRN
Status: DISCONTINUED | OUTPATIENT
Start: 2025-08-14 | End: 2025-08-16 | Stop reason: HOSPADM

## 2025-08-14 RX ADMIN — PANTOPRAZOLE SODIUM 40 MG: 40 TABLET, DELAYED RELEASE ORAL at 08:30

## 2025-08-14 RX ADMIN — HUMAN ALBUMIN MICROSPHERES AND PERFLUTREN 5 ML (DILUTED): 10; .22 INJECTION, SOLUTION INTRAVENOUS at 10:11

## 2025-08-14 RX ADMIN — SODIUM CHLORIDE: 0.9 INJECTION, SOLUTION INTRAVENOUS at 06:03

## 2025-08-14 RX ADMIN — HYDROXYZINE HYDROCHLORIDE 25 MG: 25 TABLET ORAL at 15:03

## 2025-08-14 ASSESSMENT — ACTIVITIES OF DAILY LIVING (ADL)
ADLS_ACUITY_SCORE: 48
ADLS_ACUITY_SCORE: 50
ADLS_ACUITY_SCORE: 51
ADLS_ACUITY_SCORE: 48
ADLS_ACUITY_SCORE: 48
ADLS_ACUITY_SCORE: 50
ADLS_ACUITY_SCORE: 50
ADLS_ACUITY_SCORE: 48
ADLS_ACUITY_SCORE: 50
ADLS_ACUITY_SCORE: 50
ADLS_ACUITY_SCORE: 48
ADLS_ACUITY_SCORE: 50
ADLS_ACUITY_SCORE: 48
ADLS_ACUITY_SCORE: 48
ADLS_ACUITY_SCORE: 50

## 2025-08-15 ENCOUNTER — APPOINTMENT (OUTPATIENT)
Dept: PHYSICAL THERAPY | Facility: CLINIC | Age: 70
End: 2025-08-15
Payer: COMMERCIAL

## 2025-08-15 ENCOUNTER — APPOINTMENT (OUTPATIENT)
Dept: MRI IMAGING | Facility: CLINIC | Age: 70
End: 2025-08-15
Attending: PHYSICIAN ASSISTANT
Payer: COMMERCIAL

## 2025-08-15 LAB
ANION GAP SERPL CALCULATED.3IONS-SCNC: 8 MMOL/L (ref 7–15)
ATRIAL RATE - MUSE: 69 BPM
BUN SERPL-MCNC: 8.6 MG/DL (ref 8–23)
CALCIUM SERPL-MCNC: 8.7 MG/DL (ref 8.8–10.4)
CHLORIDE SERPL-SCNC: 109 MMOL/L (ref 98–107)
CREAT SERPL-MCNC: 0.63 MG/DL (ref 0.51–0.95)
DIASTOLIC BLOOD PRESSURE - MUSE: NORMAL MMHG
EGFRCR SERPLBLD CKD-EPI 2021: >90 ML/MIN/1.73M2
ERYTHROCYTE [DISTWIDTH] IN BLOOD BY AUTOMATED COUNT: 15.9 % (ref 10–15)
GLUCOSE SERPL-MCNC: 86 MG/DL (ref 70–99)
HCO3 SERPL-SCNC: 28 MMOL/L (ref 22–29)
HCT VFR BLD AUTO: 38.1 % (ref 35–47)
HGB BLD-MCNC: 11.9 G/DL (ref 11.7–15.7)
INTERPRETATION ECG - MUSE: NORMAL
MCH RBC QN AUTO: 27.7 PG (ref 26.5–33)
MCHC RBC AUTO-ENTMCNC: 31.2 G/DL (ref 31.5–36.5)
MCV RBC AUTO: 88.8 FL (ref 78–100)
P AXIS - MUSE: 64 DEGREES
PLATELET # BLD AUTO: 198 10E3/UL (ref 150–450)
POTASSIUM SERPL-SCNC: 3.7 MMOL/L (ref 3.4–5.3)
PR INTERVAL - MUSE: 198 MS
QRS DURATION - MUSE: 88 MS
QT - MUSE: 416 MS
QTC - MUSE: 445 MS
R AXIS - MUSE: 38 DEGREES
RBC # BLD AUTO: 4.29 10E6/UL (ref 3.8–5.2)
SODIUM SERPL-SCNC: 145 MMOL/L (ref 135–145)
SYSTOLIC BLOOD PRESSURE - MUSE: NORMAL MMHG
T AXIS - MUSE: 59 DEGREES
VENTRICULAR RATE- MUSE: 69 BPM
WBC # BLD AUTO: 8.62 10E3/UL (ref 4–11)

## 2025-08-15 PROCEDURE — 250N000013 HC RX MED GY IP 250 OP 250 PS 637

## 2025-08-15 PROCEDURE — A9585 GADOBUTROL INJECTION: HCPCS | Performed by: PHYSICIAN ASSISTANT

## 2025-08-15 PROCEDURE — 80048 BASIC METABOLIC PNL TOTAL CA: CPT | Performed by: PHYSICIAN ASSISTANT

## 2025-08-15 PROCEDURE — 97530 THERAPEUTIC ACTIVITIES: CPT | Mod: GP

## 2025-08-15 PROCEDURE — 250N000013 HC RX MED GY IP 250 OP 250 PS 637: Performed by: PHYSICIAN ASSISTANT

## 2025-08-15 PROCEDURE — 999N000157 HC STATISTIC RCP TIME EA 10 MIN

## 2025-08-15 PROCEDURE — G0378 HOSPITAL OBSERVATION PER HR: HCPCS

## 2025-08-15 PROCEDURE — 99233 SBSQ HOSP IP/OBS HIGH 50: CPT | Mod: FS | Performed by: PHYSICIAN ASSISTANT

## 2025-08-15 PROCEDURE — 36415 COLL VENOUS BLD VENIPUNCTURE: CPT | Performed by: PHYSICIAN ASSISTANT

## 2025-08-15 PROCEDURE — 99207 PR APP CREDIT; MD BILLING SHARED VISIT: CPT | Mod: FS | Performed by: STUDENT IN AN ORGANIZED HEALTH CARE EDUCATION/TRAINING PROGRAM

## 2025-08-15 PROCEDURE — 70553 MRI BRAIN STEM W/O & W/DYE: CPT

## 2025-08-15 PROCEDURE — 70553 MRI BRAIN STEM W/O & W/DYE: CPT | Mod: 26 | Performed by: RADIOLOGY

## 2025-08-15 PROCEDURE — 85018 HEMOGLOBIN: CPT | Performed by: PHYSICIAN ASSISTANT

## 2025-08-15 PROCEDURE — 250N000009 HC RX 250: Performed by: PHYSICIAN ASSISTANT

## 2025-08-15 PROCEDURE — 255N000002 HC RX 255 OP 636: Performed by: PHYSICIAN ASSISTANT

## 2025-08-15 PROCEDURE — 94640 AIRWAY INHALATION TREATMENT: CPT

## 2025-08-15 PROCEDURE — 96361 HYDRATE IV INFUSION ADD-ON: CPT

## 2025-08-15 RX ORDER — GADOBUTROL 604.72 MG/ML
7.5 INJECTION INTRAVENOUS ONCE
Status: COMPLETED | OUTPATIENT
Start: 2025-08-15 | End: 2025-08-15

## 2025-08-15 RX ADMIN — GADOBUTROL 7 ML: 604.72 INJECTION INTRAVENOUS at 13:51

## 2025-08-15 RX ADMIN — PANTOPRAZOLE SODIUM 40 MG: 40 TABLET, DELAYED RELEASE ORAL at 08:31

## 2025-08-15 RX ADMIN — IPRATROPIUM BROMIDE AND ALBUTEROL SULFATE 3 ML: .5; 3 SOLUTION RESPIRATORY (INHALATION) at 12:04

## 2025-08-15 RX ADMIN — HYDROXYZINE HYDROCHLORIDE 25 MG: 25 TABLET ORAL at 12:35

## 2025-08-15 ASSESSMENT — ACTIVITIES OF DAILY LIVING (ADL)
ADLS_ACUITY_SCORE: 52
ADLS_ACUITY_SCORE: 50
ADLS_ACUITY_SCORE: 52
ADLS_ACUITY_SCORE: 50
ADLS_ACUITY_SCORE: 52
ADLS_ACUITY_SCORE: 52
ADLS_ACUITY_SCORE: 50
ADLS_ACUITY_SCORE: 52

## 2025-08-16 ENCOUNTER — APPOINTMENT (OUTPATIENT)
Dept: PHYSICAL THERAPY | Facility: CLINIC | Age: 70
End: 2025-08-16
Payer: COMMERCIAL

## 2025-08-16 VITALS
DIASTOLIC BLOOD PRESSURE: 71 MMHG | OXYGEN SATURATION: 94 % | BODY MASS INDEX: 30.15 KG/M2 | TEMPERATURE: 98.8 F | SYSTOLIC BLOOD PRESSURE: 146 MMHG | HEART RATE: 60 BPM | RESPIRATION RATE: 18 BRPM | WEIGHT: 152.56 LBS

## 2025-08-16 LAB
ERYTHROCYTE [DISTWIDTH] IN BLOOD BY AUTOMATED COUNT: 16 % (ref 10–15)
HCT VFR BLD AUTO: 43.5 % (ref 35–47)
HGB BLD-MCNC: 13.6 G/DL (ref 11.7–15.7)
MCH RBC QN AUTO: 27.6 PG (ref 26.5–33)
MCHC RBC AUTO-ENTMCNC: 31.3 G/DL (ref 31.5–36.5)
MCV RBC AUTO: 88.2 FL (ref 78–100)
PLATELET # BLD AUTO: 221 10E3/UL (ref 150–450)
RBC # BLD AUTO: 4.93 10E6/UL (ref 3.8–5.2)
WBC # BLD AUTO: 7.07 10E3/UL (ref 4–11)

## 2025-08-16 PROCEDURE — 250N000009 HC RX 250: Performed by: PHYSICIAN ASSISTANT

## 2025-08-16 PROCEDURE — 36415 COLL VENOUS BLD VENIPUNCTURE: CPT | Performed by: PHYSICIAN ASSISTANT

## 2025-08-16 PROCEDURE — 97530 THERAPEUTIC ACTIVITIES: CPT | Mod: GP

## 2025-08-16 PROCEDURE — 94640 AIRWAY INHALATION TREATMENT: CPT

## 2025-08-16 PROCEDURE — 999N000157 HC STATISTIC RCP TIME EA 10 MIN

## 2025-08-16 PROCEDURE — G0378 HOSPITAL OBSERVATION PER HR: HCPCS

## 2025-08-16 PROCEDURE — 99207 PR APP CREDIT; MD BILLING SHARED VISIT: CPT | Mod: FS | Performed by: PHYSICIAN ASSISTANT

## 2025-08-16 PROCEDURE — 99239 HOSP IP/OBS DSCHRG MGMT >30: CPT | Mod: FS | Performed by: STUDENT IN AN ORGANIZED HEALTH CARE EDUCATION/TRAINING PROGRAM

## 2025-08-16 PROCEDURE — 250N000013 HC RX MED GY IP 250 OP 250 PS 637

## 2025-08-16 PROCEDURE — 85018 HEMOGLOBIN: CPT | Performed by: PHYSICIAN ASSISTANT

## 2025-08-16 RX ADMIN — IPRATROPIUM BROMIDE AND ALBUTEROL SULFATE 3 ML: .5; 3 SOLUTION RESPIRATORY (INHALATION) at 14:23

## 2025-08-16 RX ADMIN — PANTOPRAZOLE SODIUM 40 MG: 40 TABLET, DELAYED RELEASE ORAL at 08:42

## 2025-08-16 ASSESSMENT — ACTIVITIES OF DAILY LIVING (ADL)
ADLS_ACUITY_SCORE: 52
DEPENDENT_IADLS:: INDEPENDENT
ADLS_ACUITY_SCORE: 52

## 2025-08-18 ENCOUNTER — TELEPHONE (OUTPATIENT)
Dept: FAMILY MEDICINE | Facility: CLINIC | Age: 70
End: 2025-08-18
Payer: COMMERCIAL

## 2025-08-18 ENCOUNTER — PATIENT OUTREACH (OUTPATIENT)
Dept: CARE COORDINATION | Facility: CLINIC | Age: 70
End: 2025-08-18
Payer: COMMERCIAL

## 2025-08-18 ENCOUNTER — MYC MEDICAL ADVICE (OUTPATIENT)
Dept: ONCOLOGY | Facility: CLINIC | Age: 70
End: 2025-08-18
Payer: COMMERCIAL

## 2025-08-18 DIAGNOSIS — C77.8 SMALL CELL CARCINOMA OF LUNG METASTATIC TO LYMPH NODES OF MULTIPLE SITES (H): ICD-10-CM

## 2025-08-18 DIAGNOSIS — T45.1X5A ALOPECIA DUE TO CYTOTOXIC DRUG: Primary | ICD-10-CM

## 2025-08-18 DIAGNOSIS — L65.8 ALOPECIA DUE TO CYTOTOXIC DRUG: Primary | ICD-10-CM

## 2025-08-18 DIAGNOSIS — C34.90 SMALL CELL CARCINOMA OF LUNG METASTATIC TO LYMPH NODES OF MULTIPLE SITES (H): ICD-10-CM

## 2025-08-18 LAB — BACTERIA SPEC CULT: NO GROWTH

## 2025-08-19 ENCOUNTER — TELEPHONE (OUTPATIENT)
Dept: FAMILY MEDICINE | Facility: CLINIC | Age: 70
End: 2025-08-19
Payer: COMMERCIAL

## 2025-08-19 RX ORDER — HEPARIN SODIUM (PORCINE) LOCK FLUSH IV SOLN 100 UNIT/ML 100 UNIT/ML
5 SOLUTION INTRAVENOUS
Status: CANCELLED | OUTPATIENT
Start: 2025-08-20

## 2025-08-19 RX ORDER — PALONOSETRON 0.05 MG/ML
0.25 INJECTION, SOLUTION INTRAVENOUS ONCE
Status: CANCELLED | OUTPATIENT
Start: 2025-08-20

## 2025-08-20 ENCOUNTER — MEDICAL CORRESPONDENCE (OUTPATIENT)
Dept: HEALTH INFORMATION MANAGEMENT | Facility: CLINIC | Age: 70
End: 2025-08-20

## 2025-08-20 ENCOUNTER — INFUSION THERAPY VISIT (OUTPATIENT)
Dept: ONCOLOGY | Facility: CLINIC | Age: 70
End: 2025-08-20
Attending: INTERNAL MEDICINE
Payer: COMMERCIAL

## 2025-08-20 ENCOUNTER — APPOINTMENT (OUTPATIENT)
Dept: LAB | Facility: CLINIC | Age: 70
End: 2025-08-20
Attending: INTERNAL MEDICINE
Payer: COMMERCIAL

## 2025-08-20 VITALS
WEIGHT: 152.2 LBS | DIASTOLIC BLOOD PRESSURE: 71 MMHG | SYSTOLIC BLOOD PRESSURE: 129 MMHG | OXYGEN SATURATION: 95 % | TEMPERATURE: 98.9 F | BODY MASS INDEX: 30.08 KG/M2 | HEART RATE: 68 BPM | RESPIRATION RATE: 17 BRPM

## 2025-08-20 DIAGNOSIS — Z51.11 ENCOUNTER FOR ANTINEOPLASTIC CHEMOTHERAPY: ICD-10-CM

## 2025-08-20 DIAGNOSIS — T45.1X5A CHEMOTHERAPY-INDUCED NEUTROPENIA: ICD-10-CM

## 2025-08-20 DIAGNOSIS — D70.1 CHEMOTHERAPY-INDUCED NEUTROPENIA: ICD-10-CM

## 2025-08-20 DIAGNOSIS — C34.90 SMALL CELL CARCINOMA OF LUNG METASTATIC TO LYMPH NODES OF MULTIPLE SITES (H): ICD-10-CM

## 2025-08-20 DIAGNOSIS — C77.8 SMALL CELL CARCINOMA OF LUNG METASTATIC TO LYMPH NODES OF MULTIPLE SITES (H): ICD-10-CM

## 2025-08-20 DIAGNOSIS — C77.8 SMALL CELL CARCINOMA OF LUNG METASTATIC TO LYMPH NODES OF MULTIPLE SITES (H): Primary | ICD-10-CM

## 2025-08-20 DIAGNOSIS — C34.90 SMALL CELL CARCINOMA OF LUNG METASTATIC TO LYMPH NODES OF MULTIPLE SITES (H): Primary | ICD-10-CM

## 2025-08-20 DIAGNOSIS — Z51.11 ENCOUNTER FOR ANTINEOPLASTIC CHEMOTHERAPY: Primary | ICD-10-CM

## 2025-08-20 LAB
ALBUMIN SERPL BCG-MCNC: 3.8 G/DL (ref 3.5–5.2)
ALP SERPL-CCNC: 98 U/L (ref 40–150)
ALT SERPL W P-5'-P-CCNC: 15 U/L (ref 0–50)
ANION GAP SERPL CALCULATED.3IONS-SCNC: 16 MMOL/L (ref 7–15)
AST SERPL W P-5'-P-CCNC: 22 U/L (ref 0–45)
BASOPHILS # BLD AUTO: 0.04 10E3/UL (ref 0–0.2)
BASOPHILS NFR BLD AUTO: 0.7 %
BILIRUB SERPL-MCNC: 0.2 MG/DL
BUN SERPL-MCNC: 8.7 MG/DL (ref 8–23)
CALCIUM SERPL-MCNC: 9 MG/DL (ref 8.8–10.4)
CHLORIDE SERPL-SCNC: 103 MMOL/L (ref 98–107)
CREAT SERPL-MCNC: 0.66 MG/DL (ref 0.51–0.95)
EGFRCR SERPLBLD CKD-EPI 2021: >90 ML/MIN/1.73M2
EOSINOPHIL # BLD AUTO: 0.07 10E3/UL (ref 0–0.7)
EOSINOPHIL NFR BLD AUTO: 1.3 %
ERYTHROCYTE [DISTWIDTH] IN BLOOD BY AUTOMATED COUNT: 15.5 % (ref 10–15)
GLUCOSE SERPL-MCNC: 122 MG/DL (ref 70–99)
HCO3 SERPL-SCNC: 23 MMOL/L (ref 22–29)
HCT VFR BLD AUTO: 43.5 % (ref 35–47)
HGB BLD-MCNC: 13.5 G/DL (ref 11.7–15.7)
IMM GRANULOCYTES # BLD: <0.03 10E3/UL
IMM GRANULOCYTES NFR BLD: 0.4 %
LYMPHOCYTES # BLD AUTO: 1.33 10E3/UL (ref 0.8–5.3)
LYMPHOCYTES NFR BLD AUTO: 24.4 %
MCH RBC QN AUTO: 27.8 PG (ref 26.5–33)
MCHC RBC AUTO-ENTMCNC: 31 G/DL (ref 31.5–36.5)
MCV RBC AUTO: 89.5 FL (ref 78–100)
MONOCYTES # BLD AUTO: 0.56 10E3/UL (ref 0–1.3)
MONOCYTES NFR BLD AUTO: 10.3 %
NEUTROPHILS # BLD AUTO: 3.43 10E3/UL (ref 1.6–8.3)
NEUTROPHILS NFR BLD AUTO: 62.9 %
NRBC # BLD AUTO: <0.03 10E3/UL
NRBC BLD AUTO-RTO: 0 /100
PLATELET # BLD AUTO: 220 10E3/UL (ref 150–450)
POTASSIUM SERPL-SCNC: 4 MMOL/L (ref 3.4–5.3)
PROT SERPL-MCNC: 6.6 G/DL (ref 6.4–8.3)
RBC # BLD AUTO: 4.86 10E6/UL (ref 3.8–5.2)
SODIUM SERPL-SCNC: 142 MMOL/L (ref 135–145)
T4 FREE SERPL-MCNC: 0.77 NG/DL (ref 0.9–1.7)
TSH SERPL DL<=0.005 MIU/L-ACNC: 5.66 UIU/ML (ref 0.3–4.2)
WBC # BLD AUTO: 5.45 10E3/UL (ref 4–11)

## 2025-08-20 PROCEDURE — 250N000011 HC RX IP 250 OP 636: Performed by: NURSE PRACTITIONER

## 2025-08-20 PROCEDURE — 84443 ASSAY THYROID STIM HORMONE: CPT | Performed by: INTERNAL MEDICINE

## 2025-08-20 PROCEDURE — 82247 BILIRUBIN TOTAL: CPT | Performed by: INTERNAL MEDICINE

## 2025-08-20 PROCEDURE — 258N000003 HC RX IP 258 OP 636: Performed by: NURSE PRACTITIONER

## 2025-08-20 PROCEDURE — 258N000003 HC RX IP 258 OP 636: Performed by: INTERNAL MEDICINE

## 2025-08-20 PROCEDURE — 96361 HYDRATE IV INFUSION ADD-ON: CPT

## 2025-08-20 PROCEDURE — 96413 CHEMO IV INFUSION 1 HR: CPT

## 2025-08-20 PROCEDURE — 250N000013 HC RX MED GY IP 250 OP 250 PS 637: Performed by: INTERNAL MEDICINE

## 2025-08-20 PROCEDURE — 96367 TX/PROPH/DG ADDL SEQ IV INF: CPT

## 2025-08-20 PROCEDURE — G0463 HOSPITAL OUTPT CLINIC VISIT: HCPCS | Mod: 25 | Performed by: NURSE PRACTITIONER

## 2025-08-20 PROCEDURE — 84439 ASSAY OF FREE THYROXINE: CPT | Performed by: INTERNAL MEDICINE

## 2025-08-20 PROCEDURE — 36415 COLL VENOUS BLD VENIPUNCTURE: CPT | Performed by: INTERNAL MEDICINE

## 2025-08-20 PROCEDURE — 96417 CHEMO IV INFUS EACH ADDL SEQ: CPT

## 2025-08-20 PROCEDURE — 250N000011 HC RX IP 250 OP 636: Performed by: INTERNAL MEDICINE

## 2025-08-20 PROCEDURE — 85025 COMPLETE CBC W/AUTO DIFF WBC: CPT | Performed by: INTERNAL MEDICINE

## 2025-08-20 PROCEDURE — 96375 TX/PRO/DX INJ NEW DRUG ADDON: CPT

## 2025-08-20 RX ORDER — HYDROXYZINE HYDROCHLORIDE 25 MG/1
25 TABLET, FILM COATED ORAL 2 TIMES DAILY PRN
Status: DISCONTINUED | OUTPATIENT
Start: 2025-08-20 | End: 2025-08-20 | Stop reason: HOSPADM

## 2025-08-20 RX ORDER — PALONOSETRON 0.05 MG/ML
0.25 INJECTION, SOLUTION INTRAVENOUS ONCE
Status: COMPLETED | OUTPATIENT
Start: 2025-08-20 | End: 2025-08-20

## 2025-08-20 RX ADMIN — SODIUM CHLORIDE 1000 ML: 0.9 INJECTION, SOLUTION INTRAVENOUS at 07:46

## 2025-08-20 RX ADMIN — PALONOSETRON HYDROCHLORIDE 0.25 MG: 0.25 INJECTION INTRAVENOUS at 08:25

## 2025-08-20 RX ADMIN — FOSAPREPITANT: 150 INJECTION, POWDER, LYOPHILIZED, FOR SOLUTION INTRAVENOUS at 08:29

## 2025-08-20 RX ADMIN — TRILACICLIB 405 MG: 300 INJECTION, POWDER, LYOPHILIZED, FOR SOLUTION INTRAVENOUS at 10:05

## 2025-08-20 RX ADMIN — CARBOPLATIN 450 MG: 10 INJECTION INTRAVENOUS at 10:49

## 2025-08-20 RX ADMIN — HYDROXYZINE HYDROCHLORIDE 25 MG: 25 TABLET, FILM COATED ORAL at 07:45

## 2025-08-20 RX ADMIN — ETOPOSIDE 170 MG: 20 INJECTION INTRAVENOUS at 11:23

## 2025-08-20 RX ADMIN — ATEZOLIZUMAB 1200 MG: 1200 INJECTION, SOLUTION INTRAVENOUS at 09:22

## 2025-08-20 ASSESSMENT — PAIN SCALES - GENERAL: PAINLEVEL_OUTOF10: NO PAIN (0)

## 2025-08-21 ENCOUNTER — INFUSION THERAPY VISIT (OUTPATIENT)
Dept: ONCOLOGY | Facility: CLINIC | Age: 70
End: 2025-08-21
Attending: INTERNAL MEDICINE
Payer: COMMERCIAL

## 2025-08-21 ENCOUNTER — OFFICE VISIT (OUTPATIENT)
Dept: FAMILY MEDICINE | Facility: CLINIC | Age: 70
End: 2025-08-21
Payer: COMMERCIAL

## 2025-08-21 VITALS
BODY MASS INDEX: 31.12 KG/M2 | OXYGEN SATURATION: 96 % | DIASTOLIC BLOOD PRESSURE: 75 MMHG | HEIGHT: 60 IN | TEMPERATURE: 98.5 F | HEART RATE: 76 BPM | RESPIRATION RATE: 20 BRPM | WEIGHT: 158.5 LBS | SYSTOLIC BLOOD PRESSURE: 147 MMHG

## 2025-08-21 VITALS
RESPIRATION RATE: 16 BRPM | DIASTOLIC BLOOD PRESSURE: 80 MMHG | OXYGEN SATURATION: 95 % | HEART RATE: 81 BPM | SYSTOLIC BLOOD PRESSURE: 137 MMHG | TEMPERATURE: 98.5 F

## 2025-08-21 DIAGNOSIS — C77.8 SMALL CELL CARCINOMA OF LUNG METASTATIC TO LYMPH NODES OF MULTIPLE SITES (H): ICD-10-CM

## 2025-08-21 DIAGNOSIS — Z51.11 ENCOUNTER FOR ANTINEOPLASTIC CHEMOTHERAPY: ICD-10-CM

## 2025-08-21 DIAGNOSIS — T45.1X5A CHEMOTHERAPY-INDUCED NEUTROPENIA: Primary | ICD-10-CM

## 2025-08-21 DIAGNOSIS — C34.90 SMALL CELL CARCINOMA OF LUNG METASTATIC TO LYMPH NODES OF MULTIPLE SITES (H): ICD-10-CM

## 2025-08-21 DIAGNOSIS — I10 BENIGN ESSENTIAL HYPERTENSION: ICD-10-CM

## 2025-08-21 DIAGNOSIS — D70.1 CHEMOTHERAPY-INDUCED NEUTROPENIA: Primary | ICD-10-CM

## 2025-08-21 DIAGNOSIS — R55 SYNCOPE AND COLLAPSE: ICD-10-CM

## 2025-08-21 DIAGNOSIS — E11.9 CONTROLLED TYPE 2 DIABETES MELLITUS WITHOUT COMPLICATION, WITHOUT LONG-TERM CURRENT USE OF INSULIN (H): Primary | ICD-10-CM

## 2025-08-21 PROCEDURE — 258N000003 HC RX IP 258 OP 636: Performed by: NURSE PRACTITIONER

## 2025-08-21 PROCEDURE — 250N000011 HC RX IP 250 OP 636: Mod: JW | Performed by: NURSE PRACTITIONER

## 2025-08-21 PROCEDURE — 250N000013 HC RX MED GY IP 250 OP 250 PS 637: Performed by: NURSE PRACTITIONER

## 2025-08-21 PROCEDURE — 258N000003 HC RX IP 258 OP 636: Performed by: INTERNAL MEDICINE

## 2025-08-21 PROCEDURE — 250N000011 HC RX IP 250 OP 636: Performed by: INTERNAL MEDICINE

## 2025-08-21 RX ORDER — HYDROXYZINE HYDROCHLORIDE 25 MG/1
25 TABLET, FILM COATED ORAL DAILY PRN
Start: 2025-08-22

## 2025-08-21 RX ORDER — HYDROXYZINE HYDROCHLORIDE 25 MG/1
25 TABLET, FILM COATED ORAL DAILY PRN
Status: CANCELLED
Start: 2025-08-21

## 2025-08-21 RX ORDER — HYDROXYZINE HYDROCHLORIDE 25 MG/1
25 TABLET, FILM COATED ORAL DAILY PRN
Status: DISCONTINUED | OUTPATIENT
Start: 2025-08-21 | End: 2025-08-21 | Stop reason: HOSPADM

## 2025-08-21 RX ADMIN — HYDROXYZINE HYDROCHLORIDE 25 MG: 25 TABLET, FILM COATED ORAL at 08:43

## 2025-08-21 RX ADMIN — TRILACICLIB 405 MG: 300 INJECTION, POWDER, LYOPHILIZED, FOR SOLUTION INTRAVENOUS at 08:53

## 2025-08-21 RX ADMIN — SODIUM CHLORIDE 250 ML: 0.9 INJECTION, SOLUTION INTRAVENOUS at 08:20

## 2025-08-21 RX ADMIN — ETOPOSIDE 170 MG: 20 INJECTION INTRAVENOUS at 09:29

## 2025-08-21 RX ADMIN — DEXAMETHASONE SODIUM PHOSPHATE 12 MG: 10 INJECTION, SOLUTION INTRAMUSCULAR; INTRAVENOUS at 08:27

## 2025-08-21 ASSESSMENT — PAIN SCALES - GENERAL: PAINLEVEL_OUTOF10: NO PAIN (0)

## 2025-08-25 ENCOUNTER — TELEPHONE (OUTPATIENT)
Dept: FAMILY MEDICINE | Facility: CLINIC | Age: 70
End: 2025-08-25
Payer: COMMERCIAL

## 2025-08-25 DIAGNOSIS — I10 BENIGN ESSENTIAL HYPERTENSION: ICD-10-CM

## 2025-08-25 RX ORDER — AMLODIPINE BESYLATE 10 MG/1
5 TABLET ORAL DAILY
COMMUNITY
Start: 2025-08-25

## 2025-08-25 RX ORDER — LISINOPRIL 20 MG/1
20 TABLET ORAL DAILY
COMMUNITY
Start: 2025-08-25

## 2025-09-02 ENCOUNTER — ANCILLARY PROCEDURE (OUTPATIENT)
Dept: CT IMAGING | Facility: CLINIC | Age: 70
End: 2025-09-02
Attending: INTERNAL MEDICINE
Payer: COMMERCIAL

## 2025-09-02 DIAGNOSIS — C34.90 SMALL CELL CARCINOMA OF LUNG METASTATIC TO LYMPH NODES OF MULTIPLE SITES (H): ICD-10-CM

## 2025-09-02 DIAGNOSIS — C77.8 SMALL CELL CARCINOMA OF LUNG METASTATIC TO LYMPH NODES OF MULTIPLE SITES (H): ICD-10-CM

## 2025-09-02 PROCEDURE — 74177 CT ABD & PELVIS W/CONTRAST: CPT | Performed by: RADIOLOGY

## 2025-09-02 PROCEDURE — 71260 CT THORAX DX C+: CPT | Performed by: RADIOLOGY

## 2025-09-02 RX ORDER — IOPAMIDOL 755 MG/ML
84 INJECTION, SOLUTION INTRAVASCULAR ONCE
Status: COMPLETED | OUTPATIENT
Start: 2025-09-02 | End: 2025-09-02

## 2025-09-02 RX ADMIN — IOPAMIDOL 84 ML: 755 INJECTION, SOLUTION INTRAVASCULAR at 14:29

## 2025-09-03 ENCOUNTER — ONCOLOGY VISIT (OUTPATIENT)
Dept: ONCOLOGY | Facility: CLINIC | Age: 70
End: 2025-09-03
Attending: INTERNAL MEDICINE
Payer: COMMERCIAL

## 2025-09-03 VITALS
WEIGHT: 152 LBS | DIASTOLIC BLOOD PRESSURE: 77 MMHG | HEART RATE: 65 BPM | OXYGEN SATURATION: 95 % | SYSTOLIC BLOOD PRESSURE: 153 MMHG | BODY MASS INDEX: 30.04 KG/M2 | RESPIRATION RATE: 16 BRPM | TEMPERATURE: 98.4 F

## 2025-09-03 DIAGNOSIS — C77.8 SMALL CELL CARCINOMA OF LUNG METASTATIC TO LYMPH NODES OF MULTIPLE SITES (H): ICD-10-CM

## 2025-09-03 DIAGNOSIS — C34.80 SMALL CELL CARCINOMA OF OVERLAPPING SITES OF LUNG, UNSPECIFIED LATERALITY (H): Primary | ICD-10-CM

## 2025-09-03 DIAGNOSIS — C34.90 SMALL CELL CARCINOMA OF LUNG METASTATIC TO LYMPH NODES OF MULTIPLE SITES (H): ICD-10-CM

## 2025-09-03 DIAGNOSIS — Z51.11 ENCOUNTER FOR ANTINEOPLASTIC CHEMOTHERAPY: ICD-10-CM

## 2025-09-03 DIAGNOSIS — Z87.891 PERSONAL HISTORY OF NICOTINE DEPENDENCE: ICD-10-CM

## 2025-09-03 DIAGNOSIS — T45.1X5A CHEMOTHERAPY-INDUCED NEUTROPENIA: ICD-10-CM

## 2025-09-03 DIAGNOSIS — D70.1 CHEMOTHERAPY-INDUCED NEUTROPENIA: ICD-10-CM

## 2025-09-03 PROCEDURE — G0463 HOSPITAL OUTPT CLINIC VISIT: HCPCS | Performed by: INTERNAL MEDICINE

## 2025-09-03 RX ORDER — HYDROXYZINE HYDROCHLORIDE 25 MG/1
25 TABLET, FILM COATED ORAL DAILY PRN
Start: 2025-10-01

## 2025-09-03 RX ORDER — HEPARIN SODIUM (PORCINE) LOCK FLUSH IV SOLN 100 UNIT/ML 100 UNIT/ML
5 SOLUTION INTRAVENOUS
OUTPATIENT
Start: 2025-12-03

## 2025-09-03 RX ORDER — LORAZEPAM 2 MG/ML
0.5 INJECTION INTRAMUSCULAR EVERY 4 HOURS PRN
OUTPATIENT
Start: 2025-11-12

## 2025-09-03 RX ORDER — ALBUTEROL SULFATE 0.83 MG/ML
2.5 SOLUTION RESPIRATORY (INHALATION)
OUTPATIENT
Start: 2025-10-22

## 2025-09-03 RX ORDER — HYDROXYZINE HYDROCHLORIDE 25 MG/1
25 TABLET, FILM COATED ORAL DAILY PRN
Start: 2025-10-22

## 2025-09-03 RX ORDER — MEPERIDINE HYDROCHLORIDE 25 MG/ML
25 INJECTION INTRAMUSCULAR; INTRAVENOUS; SUBCUTANEOUS
OUTPATIENT
Start: 2025-11-12

## 2025-09-03 RX ORDER — ALBUTEROL SULFATE 0.83 MG/ML
2.5 SOLUTION RESPIRATORY (INHALATION)
OUTPATIENT
Start: 2025-12-03

## 2025-09-03 RX ORDER — HEPARIN SODIUM,PORCINE 10 UNIT/ML
5-20 VIAL (ML) INTRAVENOUS DAILY PRN
OUTPATIENT
Start: 2025-12-03

## 2025-09-03 RX ORDER — DIPHENHYDRAMINE HYDROCHLORIDE 50 MG/ML
50 INJECTION INTRAMUSCULAR; INTRAVENOUS
Start: 2025-10-22

## 2025-09-03 RX ORDER — HYDROXYZINE HYDROCHLORIDE 25 MG/1
25 TABLET, FILM COATED ORAL DAILY PRN
Start: 2025-09-10

## 2025-09-03 RX ORDER — HEPARIN SODIUM,PORCINE 10 UNIT/ML
5-20 VIAL (ML) INTRAVENOUS DAILY PRN
OUTPATIENT
Start: 2025-10-22

## 2025-09-03 RX ORDER — ALBUTEROL SULFATE 0.83 MG/ML
2.5 SOLUTION RESPIRATORY (INHALATION)
OUTPATIENT
Start: 2025-11-12

## 2025-09-03 RX ORDER — DIPHENHYDRAMINE HYDROCHLORIDE 50 MG/ML
25 INJECTION INTRAMUSCULAR; INTRAVENOUS
Start: 2025-12-03

## 2025-09-03 RX ORDER — HYDROXYZINE HYDROCHLORIDE 25 MG/1
25 TABLET, FILM COATED ORAL DAILY PRN
Start: 2025-10-03

## 2025-09-03 RX ORDER — BENZONATATE 100 MG/1
100 CAPSULE ORAL 3 TIMES DAILY PRN
Qty: 90 CAPSULE | Refills: 1 | Status: SHIPPED | OUTPATIENT
Start: 2025-09-03

## 2025-09-03 RX ORDER — METHYLPREDNISOLONE SODIUM SUCCINATE 40 MG/ML
40 INJECTION INTRAMUSCULAR; INTRAVENOUS
Start: 2025-12-03

## 2025-09-03 RX ORDER — HEPARIN SODIUM (PORCINE) LOCK FLUSH IV SOLN 100 UNIT/ML 100 UNIT/ML
5 SOLUTION INTRAVENOUS
OUTPATIENT
Start: 2025-10-22

## 2025-09-03 RX ORDER — LORAZEPAM 2 MG/ML
0.5 INJECTION INTRAMUSCULAR EVERY 4 HOURS PRN
OUTPATIENT
Start: 2025-10-22

## 2025-09-03 RX ORDER — EPINEPHRINE 1 MG/ML
0.3 INJECTION, SOLUTION INTRAMUSCULAR; SUBCUTANEOUS EVERY 5 MIN PRN
OUTPATIENT
Start: 2025-12-03

## 2025-09-03 RX ORDER — HEPARIN SODIUM,PORCINE 10 UNIT/ML
5-20 VIAL (ML) INTRAVENOUS DAILY PRN
OUTPATIENT
Start: 2025-11-12

## 2025-09-03 RX ORDER — METHYLPREDNISOLONE SODIUM SUCCINATE 40 MG/ML
40 INJECTION INTRAMUSCULAR; INTRAVENOUS
Start: 2025-11-12

## 2025-09-03 RX ORDER — HYDROXYZINE HYDROCHLORIDE 25 MG/1
25 TABLET, FILM COATED ORAL DAILY PRN
Start: 2025-09-12

## 2025-09-03 RX ORDER — BUPROPION HYDROCHLORIDE 150 MG/1
150 TABLET, EXTENDED RELEASE ORAL 2 TIMES DAILY
Qty: 60 TABLET | Refills: 11 | Status: SHIPPED | OUTPATIENT
Start: 2025-09-03

## 2025-09-03 RX ORDER — MEPERIDINE HYDROCHLORIDE 25 MG/ML
25 INJECTION INTRAMUSCULAR; INTRAVENOUS; SUBCUTANEOUS
OUTPATIENT
Start: 2025-12-03

## 2025-09-03 RX ORDER — HYDROXYZINE HYDROCHLORIDE 25 MG/1
25 TABLET, FILM COATED ORAL DAILY PRN
Start: 2025-10-02

## 2025-09-03 RX ORDER — HYDROXYZINE HYDROCHLORIDE 25 MG/1
25 TABLET, FILM COATED ORAL DAILY PRN
Start: 2025-11-12

## 2025-09-03 RX ORDER — HYDROXYZINE HYDROCHLORIDE 25 MG/1
25 TABLET, FILM COATED ORAL DAILY PRN
Start: 2025-09-11

## 2025-09-03 RX ORDER — ALBUTEROL SULFATE 90 UG/1
1-2 INHALANT RESPIRATORY (INHALATION)
Start: 2025-12-03

## 2025-09-03 RX ORDER — DIPHENHYDRAMINE HYDROCHLORIDE 50 MG/ML
25 INJECTION INTRAMUSCULAR; INTRAVENOUS
Start: 2025-11-12

## 2025-09-03 RX ORDER — METHYLPREDNISOLONE SODIUM SUCCINATE 40 MG/ML
40 INJECTION INTRAMUSCULAR; INTRAVENOUS
Start: 2025-10-22

## 2025-09-03 RX ORDER — DIPHENHYDRAMINE HYDROCHLORIDE 50 MG/ML
50 INJECTION INTRAMUSCULAR; INTRAVENOUS
Start: 2025-12-03

## 2025-09-03 RX ORDER — MEPERIDINE HYDROCHLORIDE 25 MG/ML
25 INJECTION INTRAMUSCULAR; INTRAVENOUS; SUBCUTANEOUS
OUTPATIENT
Start: 2025-10-22

## 2025-09-03 RX ORDER — DIPHENHYDRAMINE HYDROCHLORIDE 50 MG/ML
25 INJECTION INTRAMUSCULAR; INTRAVENOUS
Start: 2025-10-22

## 2025-09-03 RX ORDER — DIPHENHYDRAMINE HYDROCHLORIDE 50 MG/ML
50 INJECTION INTRAMUSCULAR; INTRAVENOUS
Start: 2025-11-12

## 2025-09-03 RX ORDER — HEPARIN SODIUM (PORCINE) LOCK FLUSH IV SOLN 100 UNIT/ML 100 UNIT/ML
5 SOLUTION INTRAVENOUS
OUTPATIENT
Start: 2025-11-12

## 2025-09-03 RX ORDER — EPINEPHRINE 1 MG/ML
0.3 INJECTION, SOLUTION INTRAMUSCULAR; SUBCUTANEOUS EVERY 5 MIN PRN
OUTPATIENT
Start: 2025-11-12

## 2025-09-03 RX ORDER — EPINEPHRINE 1 MG/ML
0.3 INJECTION, SOLUTION INTRAMUSCULAR; SUBCUTANEOUS EVERY 5 MIN PRN
OUTPATIENT
Start: 2025-10-22

## 2025-09-03 RX ORDER — ALBUTEROL SULFATE 90 UG/1
1-2 INHALANT RESPIRATORY (INHALATION)
Start: 2025-10-22

## 2025-09-03 RX ORDER — LORAZEPAM 2 MG/ML
0.5 INJECTION INTRAMUSCULAR EVERY 4 HOURS PRN
OUTPATIENT
Start: 2025-12-03

## 2025-09-03 RX ORDER — HYDROXYZINE HYDROCHLORIDE 25 MG/1
25 TABLET, FILM COATED ORAL DAILY PRN
Start: 2025-12-03

## 2025-09-03 RX ORDER — ALBUTEROL SULFATE 90 UG/1
1-2 INHALANT RESPIRATORY (INHALATION)
Start: 2025-11-12

## 2025-09-03 ASSESSMENT — PAIN SCALES - GENERAL: PAINLEVEL_OUTOF10: NO PAIN (0)

## (undated) DEVICE — ENDO ADPT BRONCH SWIVEL Y A1002

## (undated) DEVICE — LUBRICANT INST KIT ENDO-LUBE 220-90

## (undated) DEVICE — ESU PROBE ERBE FLEX 2.4MMX15MR 20402-411

## (undated) DEVICE — LABEL MEDICATION SYSTEM 3303-P

## (undated) DEVICE — LINEN TOWEL PACK X5 5464

## (undated) DEVICE — ENDO VALVE SUCTION BRONCH EVIS MAJ-209

## (undated) DEVICE — ENDO VALVE BX EVIS MAJ-210

## (undated) DEVICE — ANTIFOG SOLUTION SEE SHARP 150M TROCAR SWABS 30978 (COI)

## (undated) DEVICE — KIT ENDO FIRST STEP DISINFECTANT 200ML W/POUCH EP-4

## (undated) DEVICE — PITCHER STERILE 1000ML  SSK9004A

## (undated) DEVICE — ENDO VALVE SUCTION ULTRASOUND BRONCH MAJ-1414

## (undated) DEVICE — NDL ASPIRATION EBUS-VIZISHOT 22G NA-U401SX-4022-A

## (undated) DEVICE — SYR 10ML SLIP TIP W/O NDL 303134

## (undated) DEVICE — SYR 30ML SLIP TIP W/O NDL 302833

## (undated) DEVICE — TUBING SUCTION 10'X3/16" N510

## (undated) RX ORDER — HYDROMORPHONE HCL IN WATER/PF 6 MG/30 ML
PATIENT CONTROLLED ANALGESIA SYRINGE INTRAVENOUS
Status: DISPENSED
Start: 2025-07-07

## (undated) RX ORDER — FENTANYL CITRATE 50 UG/ML
INJECTION, SOLUTION INTRAMUSCULAR; INTRAVENOUS
Status: DISPENSED
Start: 2022-10-10

## (undated) RX ORDER — FENTANYL CITRATE 50 UG/ML
INJECTION, SOLUTION INTRAMUSCULAR; INTRAVENOUS
Status: DISPENSED
Start: 2025-07-07

## (undated) RX ORDER — DEXAMETHASONE SODIUM PHOSPHATE 4 MG/ML
INJECTION, SOLUTION INTRA-ARTICULAR; INTRALESIONAL; INTRAMUSCULAR; INTRAVENOUS; SOFT TISSUE
Status: DISPENSED
Start: 2025-07-07

## (undated) RX ORDER — IPRATROPIUM BROMIDE AND ALBUTEROL SULFATE 2.5; .5 MG/3ML; MG/3ML
SOLUTION RESPIRATORY (INHALATION)
Status: DISPENSED
Start: 2025-07-07

## (undated) RX ORDER — ACETAMINOPHEN 325 MG/1
TABLET ORAL
Status: DISPENSED
Start: 2025-07-07

## (undated) RX ORDER — DIPHENHYDRAMINE HYDROCHLORIDE 50 MG/ML
INJECTION, SOLUTION INTRAMUSCULAR; INTRAVENOUS
Status: DISPENSED
Start: 2025-07-07

## (undated) RX ORDER — PROPOFOL 10 MG/ML
INJECTION, EMULSION INTRAVENOUS
Status: DISPENSED
Start: 2025-07-07

## (undated) RX ORDER — ONDANSETRON 2 MG/ML
INJECTION INTRAMUSCULAR; INTRAVENOUS
Status: DISPENSED
Start: 2025-07-07